# Patient Record
Sex: FEMALE | Race: WHITE | Employment: UNEMPLOYED
[De-identification: names, ages, dates, MRNs, and addresses within clinical notes are randomized per-mention and may not be internally consistent; named-entity substitution may affect disease eponyms.]

---
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
## 2018-04-04 ENCOUNTER — HOSPITAL ENCOUNTER (EMERGENCY)
Age: 18
Discharge: HOME | End: 2018-04-04
Payer: COMMERCIAL

## 2018-04-04 VITALS
SYSTOLIC BLOOD PRESSURE: 119 MMHG | WEIGHT: 180 LBS | RESPIRATION RATE: 16 BRPM | DIASTOLIC BLOOD PRESSURE: 62 MMHG | OXYGEN SATURATION: 98 % | BODY MASS INDEX: 34.01 KG/M2 | TEMPERATURE: 97.2 F | HEART RATE: 87 BPM

## 2018-04-04 DIAGNOSIS — R42 VERTIGO: Primary | ICD-10-CM

## 2018-04-04 LAB
-: ABNORMAL
ABSOLUTE EOS #: 0.08 K/UL (ref 0–0.44)
ABSOLUTE IMMATURE GRANULOCYTE: 0.03 K/UL (ref 0–0.3)
ABSOLUTE LYMPH #: 2.04 K/UL (ref 1.2–5.2)
ABSOLUTE MONO #: 0.47 K/UL (ref 0.1–1.4)
ALBUMIN SERPL-MCNC: 4.1 G/DL (ref 3.5–5.2)
ALBUMIN/GLOBULIN RATIO: 1.5 (ref 1–2.5)
ALP BLD-CCNC: 106 U/L (ref 35–104)
ALT SERPL-CCNC: <5 U/L (ref 5–33)
AMORPHOUS: ABNORMAL
ANION GAP SERPL CALCULATED.3IONS-SCNC: 10 MMOL/L (ref 9–17)
AST SERPL-CCNC: 10 U/L
BACTERIA: ABNORMAL
BASOPHILS # BLD: 1 % (ref 0–2)
BASOPHILS ABSOLUTE: 0.05 K/UL (ref 0–0.2)
BILIRUB SERPL-MCNC: 0.34 MG/DL (ref 0.3–1.2)
BILIRUBIN URINE: NEGATIVE
BUN BLDV-MCNC: 6 MG/DL (ref 6–20)
BUN/CREAT BLD: 10 (ref 9–20)
CALCIUM SERPL-MCNC: 8.8 MG/DL (ref 8.6–10.4)
CASTS UA: ABNORMAL /LPF
CHLORIDE BLD-SCNC: 102 MMOL/L (ref 98–107)
CO2: 22 MMOL/L (ref 20–31)
COLOR: YELLOW
COMMENT UA: ABNORMAL
CREAT SERPL-MCNC: 0.63 MG/DL (ref 0.5–0.9)
CRYSTALS, UA: ABNORMAL /HPF
DIFFERENTIAL TYPE: ABNORMAL
EKG ATRIAL RATE: 88 BPM
EKG P AXIS: 59 DEGREES
EKG P-R INTERVAL: 132 MS
EKG Q-T INTERVAL: 370 MS
EKG QRS DURATION: 78 MS
EKG QTC CALCULATION (BAZETT): 447 MS
EKG R AXIS: 63 DEGREES
EKG T AXIS: 43 DEGREES
EKG VENTRICULAR RATE: 88 BPM
EOSINOPHILS RELATIVE PERCENT: 1 % (ref 1–4)
EPITHELIAL CELLS UA: ABNORMAL /HPF (ref 0–25)
GFR AFRICAN AMERICAN: >60 ML/MIN
GFR NON-AFRICAN AMERICAN: >60 ML/MIN
GFR SERPL CREATININE-BSD FRML MDRD: ABNORMAL ML/MIN/{1.73_M2}
GFR SERPL CREATININE-BSD FRML MDRD: ABNORMAL ML/MIN/{1.73_M2}
GLUCOSE BLD-MCNC: 96 MG/DL (ref 70–99)
GLUCOSE URINE: NEGATIVE
HCG(URINE) PREGNANCY TEST: NEGATIVE
HCT VFR BLD CALC: 39.1 % (ref 36.3–47.1)
HEMOGLOBIN: 12.5 G/DL (ref 11.9–15.1)
IMMATURE GRANULOCYTES: 0 %
KETONES, URINE: NEGATIVE
LEUKOCYTE ESTERASE, URINE: ABNORMAL
LYMPHOCYTES # BLD: 20 % (ref 25–45)
MCH RBC QN AUTO: 26.7 PG (ref 25.2–33.5)
MCHC RBC AUTO-ENTMCNC: 32 G/DL (ref 28.4–34.8)
MCV RBC AUTO: 83.5 FL (ref 82.6–102.9)
MONOCYTES # BLD: 5 % (ref 2–8)
MUCUS: ABNORMAL
NITRITE, URINE: NEGATIVE
NRBC AUTOMATED: 0 PER 100 WBC
OTHER OBSERVATIONS UA: ABNORMAL
PDW BLD-RTO: 14.3 % (ref 11.8–14.4)
PH UA: 5.5 (ref 5–9)
PLATELET # BLD: 377 K/UL (ref 138–453)
PLATELET ESTIMATE: ABNORMAL
PMV BLD AUTO: 9.9 FL (ref 8.1–13.5)
POTASSIUM SERPL-SCNC: 4 MMOL/L (ref 3.7–5.3)
PROTEIN UA: NEGATIVE
RBC # BLD: 4.68 M/UL (ref 3.95–5.11)
RBC # BLD: ABNORMAL 10*6/UL
RBC UA: ABNORMAL /HPF (ref 0–2)
RENAL EPITHELIAL, UA: ABNORMAL /HPF
SEG NEUTROPHILS: 74 % (ref 34–64)
SEGMENTED NEUTROPHILS ABSOLUTE COUNT: 7.58 K/UL (ref 1.8–8)
SODIUM BLD-SCNC: 134 MMOL/L (ref 135–144)
SPECIFIC GRAVITY UA: 1.02 (ref 1.01–1.02)
TOTAL PROTEIN: 6.8 G/DL (ref 6.4–8.3)
TRICHOMONAS: ABNORMAL
TROPONIN INTERP: NORMAL
TROPONIN T: <0.03 NG/ML
TURBIDITY: CLEAR
URINE HGB: NEGATIVE
UROBILINOGEN, URINE: NORMAL
WBC # BLD: 10.3 K/UL (ref 4.5–13.5)
WBC # BLD: ABNORMAL 10*3/UL
WBC UA: ABNORMAL /HPF (ref 0–5)
YEAST: ABNORMAL

## 2018-04-04 PROCEDURE — 36415 COLL VENOUS BLD VENIPUNCTURE: CPT

## 2018-04-04 PROCEDURE — 93005 ELECTROCARDIOGRAM TRACING: CPT

## 2018-04-04 PROCEDURE — 6360000002 HC RX W HCPCS: Performed by: PHYSICIAN ASSISTANT

## 2018-04-04 PROCEDURE — 80053 COMPREHEN METABOLIC PANEL: CPT

## 2018-04-04 PROCEDURE — 99284 EMERGENCY DEPT VISIT MOD MDM: CPT

## 2018-04-04 PROCEDURE — 84703 CHORIONIC GONADOTROPIN ASSAY: CPT

## 2018-04-04 PROCEDURE — 84484 ASSAY OF TROPONIN QUANT: CPT

## 2018-04-04 PROCEDURE — 6370000000 HC RX 637 (ALT 250 FOR IP): Performed by: PHYSICIAN ASSISTANT

## 2018-04-04 PROCEDURE — 81001 URINALYSIS AUTO W/SCOPE: CPT

## 2018-04-04 PROCEDURE — 96374 THER/PROPH/DIAG INJ IV PUSH: CPT

## 2018-04-04 PROCEDURE — 85025 COMPLETE CBC W/AUTO DIFF WBC: CPT

## 2018-04-04 PROCEDURE — 2580000003 HC RX 258: Performed by: PHYSICIAN ASSISTANT

## 2018-04-04 RX ORDER — ONDANSETRON 2 MG/ML
4 INJECTION INTRAMUSCULAR; INTRAVENOUS ONCE
Status: COMPLETED | OUTPATIENT
Start: 2018-04-04 | End: 2018-04-04

## 2018-04-04 RX ORDER — 0.9 % SODIUM CHLORIDE 0.9 %
1000 INTRAVENOUS SOLUTION INTRAVENOUS ONCE
Status: COMPLETED | OUTPATIENT
Start: 2018-04-04 | End: 2018-04-04

## 2018-04-04 RX ORDER — MECLIZINE HCL 12.5 MG/1
12.5 TABLET ORAL ONCE
Status: COMPLETED | OUTPATIENT
Start: 2018-04-04 | End: 2018-04-04

## 2018-04-04 RX ORDER — ONDANSETRON 4 MG/1
4 TABLET, ORALLY DISINTEGRATING ORAL EVERY 8 HOURS PRN
Qty: 12 TABLET | Refills: 0 | Status: SHIPPED | OUTPATIENT
Start: 2018-04-04 | End: 2018-04-13

## 2018-04-04 RX ADMIN — SODIUM CHLORIDE 1000 ML: 9 INJECTION, SOLUTION INTRAVENOUS at 12:45

## 2018-04-04 RX ADMIN — MECLIZINE 12.5 MG: 12.5 TABLET ORAL at 13:21

## 2018-04-04 RX ADMIN — ONDANSETRON 4 MG: 2 INJECTION INTRAMUSCULAR; INTRAVENOUS at 12:46

## 2018-04-04 ASSESSMENT — ENCOUNTER SYMPTOMS
SORE THROAT: 0
EYE REDNESS: 0
VOMITING: 0
SHORTNESS OF BREATH: 0
COUGH: 0
NAUSEA: 1
ABDOMINAL PAIN: 0
BLOOD IN STOOL: 0
WHEEZING: 0
CONSTIPATION: 0
DIARRHEA: 0
RHINORRHEA: 0
EYE DISCHARGE: 0
CHEST TIGHTNESS: 0
BACK PAIN: 0

## 2018-04-04 ASSESSMENT — PAIN SCALES - GENERAL
PAINLEVEL_OUTOF10: 0
PAINLEVEL_OUTOF10: 4

## 2018-04-04 ASSESSMENT — PAIN DESCRIPTION - DESCRIPTORS: DESCRIPTORS: THROBBING

## 2018-04-04 ASSESSMENT — PAIN DESCRIPTION - LOCATION: LOCATION: HEAD

## 2018-04-04 ASSESSMENT — PAIN DESCRIPTION - PAIN TYPE: TYPE: ACUTE PAIN

## 2018-04-04 ASSESSMENT — PAIN DESCRIPTION - ORIENTATION: ORIENTATION: ANTERIOR

## 2018-04-09 ENCOUNTER — HOSPITAL ENCOUNTER (EMERGENCY)
Age: 18
Discharge: HOME | End: 2018-04-09
Attending: EMERGENCY MEDICINE
Payer: COMMERCIAL

## 2018-04-09 VITALS
TEMPERATURE: 97.7 F | SYSTOLIC BLOOD PRESSURE: 105 MMHG | HEART RATE: 80 BPM | DIASTOLIC BLOOD PRESSURE: 66 MMHG | RESPIRATION RATE: 14 BRPM | OXYGEN SATURATION: 98 %

## 2018-04-09 DIAGNOSIS — R42 DIZZINESS: ICD-10-CM

## 2018-04-09 DIAGNOSIS — R42 VERTIGO: Primary | ICD-10-CM

## 2018-04-09 DIAGNOSIS — R11.2 NON-INTRACTABLE VOMITING WITH NAUSEA, UNSPECIFIED VOMITING TYPE: ICD-10-CM

## 2018-04-09 LAB
-: ABNORMAL
ABSOLUTE EOS #: 0.14 K/UL (ref 0–0.44)
ABSOLUTE IMMATURE GRANULOCYTE: 0.03 K/UL (ref 0–0.3)
ABSOLUTE LYMPH #: 1.92 K/UL (ref 1.2–5.2)
ABSOLUTE MONO #: 0.35 K/UL (ref 0.1–1.4)
ALBUMIN SERPL-MCNC: 4.1 G/DL (ref 3.5–5.2)
ALBUMIN/GLOBULIN RATIO: 1.5 (ref 1–2.5)
ALP BLD-CCNC: 110 U/L (ref 35–104)
ALT SERPL-CCNC: 6 U/L (ref 5–33)
AMORPHOUS: ABNORMAL
ANION GAP SERPL CALCULATED.3IONS-SCNC: 11 MMOL/L (ref 9–17)
AST SERPL-CCNC: 8 U/L
BACTERIA: ABNORMAL
BASOPHILS # BLD: 1 % (ref 0–2)
BASOPHILS ABSOLUTE: 0.04 K/UL (ref 0–0.2)
BILIRUB SERPL-MCNC: <0.1 MG/DL (ref 0.3–1.2)
BILIRUBIN URINE: NEGATIVE
BUN BLDV-MCNC: 12 MG/DL (ref 6–20)
BUN/CREAT BLD: 16 (ref 9–20)
CALCIUM SERPL-MCNC: 9.6 MG/DL (ref 8.6–10.4)
CASTS UA: ABNORMAL /LPF
CHLORIDE BLD-SCNC: 104 MMOL/L (ref 98–107)
CO2: 25 MMOL/L (ref 20–31)
COLOR: YELLOW
COMMENT UA: ABNORMAL
CREAT SERPL-MCNC: 0.74 MG/DL (ref 0.5–0.9)
CRYSTALS, UA: ABNORMAL /HPF
DIFFERENTIAL TYPE: ABNORMAL
EOSINOPHILS RELATIVE PERCENT: 2 % (ref 1–4)
EPITHELIAL CELLS UA: ABNORMAL /HPF (ref 0–25)
GFR AFRICAN AMERICAN: >60 ML/MIN
GFR NON-AFRICAN AMERICAN: >60 ML/MIN
GFR SERPL CREATININE-BSD FRML MDRD: ABNORMAL ML/MIN/{1.73_M2}
GFR SERPL CREATININE-BSD FRML MDRD: ABNORMAL ML/MIN/{1.73_M2}
GLUCOSE BLD-MCNC: 81 MG/DL (ref 70–99)
GLUCOSE URINE: NEGATIVE
HCG(URINE) PREGNANCY TEST: NEGATIVE
HCT VFR BLD CALC: 40.9 % (ref 36.3–47.1)
HEMOGLOBIN: 13.2 G/DL (ref 11.9–15.1)
IMMATURE GRANULOCYTES: 0 %
KETONES, URINE: NEGATIVE
LEUKOCYTE ESTERASE, URINE: NEGATIVE
LIPASE: 26 U/L (ref 13–60)
LYMPHOCYTES # BLD: 25 % (ref 25–45)
MCH RBC QN AUTO: 27 PG (ref 25.2–33.5)
MCHC RBC AUTO-ENTMCNC: 32.3 G/DL (ref 28.4–34.8)
MCV RBC AUTO: 83.6 FL (ref 82.6–102.9)
MONOCYTES # BLD: 5 % (ref 2–8)
MUCUS: ABNORMAL
NITRITE, URINE: NEGATIVE
NRBC AUTOMATED: 0 PER 100 WBC
OTHER OBSERVATIONS UA: ABNORMAL
PDW BLD-RTO: 14.4 % (ref 11.8–14.4)
PH UA: 7 (ref 5–9)
PLATELET # BLD: 403 K/UL (ref 138–453)
PLATELET ESTIMATE: ABNORMAL
PMV BLD AUTO: 10 FL (ref 8.1–13.5)
POTASSIUM SERPL-SCNC: 4 MMOL/L (ref 3.7–5.3)
PROTEIN UA: NEGATIVE
RBC # BLD: 4.89 M/UL (ref 3.95–5.11)
RBC # BLD: ABNORMAL 10*6/UL
RBC UA: ABNORMAL /HPF (ref 0–2)
RENAL EPITHELIAL, UA: ABNORMAL /HPF
SEG NEUTROPHILS: 67 % (ref 34–64)
SEGMENTED NEUTROPHILS ABSOLUTE COUNT: 5.24 K/UL (ref 1.8–8)
SODIUM BLD-SCNC: 140 MMOL/L (ref 135–144)
SPECIFIC GRAVITY UA: 1.02 (ref 1.01–1.02)
TOTAL PROTEIN: 6.8 G/DL (ref 6.4–8.3)
TRICHOMONAS: ABNORMAL
TURBIDITY: ABNORMAL
URINE HGB: ABNORMAL
UROBILINOGEN, URINE: NORMAL
WBC # BLD: 7.7 K/UL (ref 4.5–13.5)
WBC # BLD: ABNORMAL 10*3/UL
WBC UA: ABNORMAL /HPF (ref 0–5)
YEAST: ABNORMAL

## 2018-04-09 PROCEDURE — 84703 CHORIONIC GONADOTROPIN ASSAY: CPT

## 2018-04-09 PROCEDURE — 80053 COMPREHEN METABOLIC PANEL: CPT

## 2018-04-09 PROCEDURE — 87086 URINE CULTURE/COLONY COUNT: CPT

## 2018-04-09 PROCEDURE — 99283 EMERGENCY DEPT VISIT LOW MDM: CPT

## 2018-04-09 PROCEDURE — 85025 COMPLETE CBC W/AUTO DIFF WBC: CPT

## 2018-04-09 PROCEDURE — 81001 URINALYSIS AUTO W/SCOPE: CPT

## 2018-04-09 PROCEDURE — 2580000003 HC RX 258: Performed by: EMERGENCY MEDICINE

## 2018-04-09 PROCEDURE — 6360000002 HC RX W HCPCS: Performed by: EMERGENCY MEDICINE

## 2018-04-09 PROCEDURE — 6370000000 HC RX 637 (ALT 250 FOR IP): Performed by: EMERGENCY MEDICINE

## 2018-04-09 PROCEDURE — 83690 ASSAY OF LIPASE: CPT

## 2018-04-09 PROCEDURE — 36415 COLL VENOUS BLD VENIPUNCTURE: CPT

## 2018-04-09 RX ORDER — MECLIZINE HCL 12.5 MG/1
12.5 TABLET ORAL ONCE
Status: COMPLETED | OUTPATIENT
Start: 2018-04-09 | End: 2018-04-09

## 2018-04-09 RX ORDER — PROMETHAZINE HYDROCHLORIDE 25 MG/1
25 TABLET ORAL ONCE
Status: COMPLETED | OUTPATIENT
Start: 2018-04-09 | End: 2018-04-09

## 2018-04-09 RX ORDER — PROMETHAZINE HYDROCHLORIDE 25 MG/1
25 TABLET ORAL EVERY 6 HOURS PRN
Qty: 28 TABLET | Refills: 0 | Status: SHIPPED | OUTPATIENT
Start: 2018-04-09 | End: 2018-04-16

## 2018-04-09 RX ORDER — 0.9 % SODIUM CHLORIDE 0.9 %
1000 INTRAVENOUS SOLUTION INTRAVENOUS ONCE
Status: COMPLETED | OUTPATIENT
Start: 2018-04-09 | End: 2018-04-09

## 2018-04-09 RX ADMIN — PROMETHAZINE HYDROCHLORIDE 25 MG: 25 TABLET ORAL at 10:53

## 2018-04-09 RX ADMIN — SODIUM CHLORIDE 1000 ML: 9 INJECTION, SOLUTION INTRAVENOUS at 11:04

## 2018-04-09 RX ADMIN — MECLIZINE 12.5 MG: 12.5 TABLET ORAL at 10:53

## 2018-04-09 ASSESSMENT — ENCOUNTER SYMPTOMS
NAUSEA: 1
VOMITING: 1

## 2018-04-10 LAB
CULTURE: NO GROWTH
CULTURE: NORMAL
Lab: NORMAL
Lab: NORMAL
SPECIMEN DESCRIPTION: NORMAL
SPECIMEN DESCRIPTION: NORMAL
STATUS: NORMAL

## 2018-04-13 ENCOUNTER — OFFICE VISIT (OUTPATIENT)
Dept: PRIMARY CARE CLINIC | Age: 18
End: 2018-04-13
Payer: COMMERCIAL

## 2018-04-13 VITALS
TEMPERATURE: 99.4 F | WEIGHT: 175.3 LBS | RESPIRATION RATE: 18 BRPM | DIASTOLIC BLOOD PRESSURE: 62 MMHG | HEART RATE: 84 BPM | SYSTOLIC BLOOD PRESSURE: 108 MMHG | BODY MASS INDEX: 33.12 KG/M2

## 2018-04-13 DIAGNOSIS — R42 VERTIGO: Primary | ICD-10-CM

## 2018-04-13 PROCEDURE — G8417 CALC BMI ABV UP PARAM F/U: HCPCS | Performed by: NURSE PRACTITIONER

## 2018-04-13 PROCEDURE — 4004F PT TOBACCO SCREEN RCVD TLK: CPT | Performed by: NURSE PRACTITIONER

## 2018-04-13 PROCEDURE — 99213 OFFICE O/P EST LOW 20 MIN: CPT | Performed by: NURSE PRACTITIONER

## 2018-04-13 PROCEDURE — G8427 DOCREV CUR MEDS BY ELIG CLIN: HCPCS | Performed by: NURSE PRACTITIONER

## 2018-04-13 ASSESSMENT — ENCOUNTER SYMPTOMS
RESPIRATORY NEGATIVE: 1
TROUBLE SWALLOWING: 0
EYES NEGATIVE: 1
GASTROINTESTINAL NEGATIVE: 1

## 2018-05-04 ENCOUNTER — OFFICE VISIT (OUTPATIENT)
Dept: OBGYN | Age: 18
End: 2018-05-04
Payer: COMMERCIAL

## 2018-05-04 VITALS
SYSTOLIC BLOOD PRESSURE: 112 MMHG | DIASTOLIC BLOOD PRESSURE: 72 MMHG | WEIGHT: 180.6 LBS | HEIGHT: 61 IN | BODY MASS INDEX: 34.1 KG/M2

## 2018-05-04 DIAGNOSIS — B96.89 BACTERIAL VAGINOSIS: Primary | ICD-10-CM

## 2018-05-04 DIAGNOSIS — N76.0 BACTERIAL VAGINOSIS: Primary | ICD-10-CM

## 2018-05-04 PROCEDURE — G8427 DOCREV CUR MEDS BY ELIG CLIN: HCPCS | Performed by: ADVANCED PRACTICE MIDWIFE

## 2018-05-04 PROCEDURE — G8417 CALC BMI ABV UP PARAM F/U: HCPCS | Performed by: ADVANCED PRACTICE MIDWIFE

## 2018-05-04 PROCEDURE — 99213 OFFICE O/P EST LOW 20 MIN: CPT | Performed by: ADVANCED PRACTICE MIDWIFE

## 2018-05-04 PROCEDURE — 4004F PT TOBACCO SCREEN RCVD TLK: CPT | Performed by: ADVANCED PRACTICE MIDWIFE

## 2018-05-04 RX ORDER — METRONIDAZOLE 500 MG/1
500 TABLET ORAL 2 TIMES DAILY
Qty: 14 TABLET | Refills: 0 | Status: SHIPPED | OUTPATIENT
Start: 2018-05-04 | End: 2018-05-11

## 2018-05-24 ENCOUNTER — TELEPHONE (OUTPATIENT)
Dept: OBGYN | Age: 18
End: 2018-05-24

## 2018-05-24 DIAGNOSIS — N76.0 ACUTE VAGINITIS: Primary | ICD-10-CM

## 2018-06-09 ENCOUNTER — HOSPITAL ENCOUNTER (EMERGENCY)
Age: 18
Discharge: HOME | End: 2018-06-09
Attending: EMERGENCY MEDICINE
Payer: COMMERCIAL

## 2018-06-09 VITALS
HEIGHT: 61 IN | TEMPERATURE: 98.4 F | OXYGEN SATURATION: 98 % | BODY MASS INDEX: 32.1 KG/M2 | RESPIRATION RATE: 12 BRPM | HEART RATE: 100 BPM | DIASTOLIC BLOOD PRESSURE: 61 MMHG | SYSTOLIC BLOOD PRESSURE: 111 MMHG | WEIGHT: 170 LBS

## 2018-06-09 DIAGNOSIS — H60.392 INFECTIVE OTITIS EXTERNA OF LEFT EAR: Primary | ICD-10-CM

## 2018-06-09 PROCEDURE — 99282 EMERGENCY DEPT VISIT SF MDM: CPT

## 2018-06-09 RX ORDER — CIPROFLOXACIN AND DEXAMETHASONE 3; 1 MG/ML; MG/ML
4 SUSPENSION/ DROPS AURICULAR (OTIC) 2 TIMES DAILY
Qty: 1 BOTTLE | Refills: 0 | Status: SHIPPED | OUTPATIENT
Start: 2018-06-09 | End: 2018-06-16

## 2018-06-09 ASSESSMENT — PAIN DESCRIPTION - ORIENTATION: ORIENTATION: LEFT

## 2018-06-09 ASSESSMENT — PAIN DESCRIPTION - PAIN TYPE: TYPE: ACUTE PAIN

## 2018-06-09 ASSESSMENT — PAIN DESCRIPTION - LOCATION: LOCATION: EAR

## 2018-06-09 ASSESSMENT — ENCOUNTER SYMPTOMS
COUGH: 1
SINUS PRESSURE: 1

## 2018-06-09 ASSESSMENT — PAIN SCALES - GENERAL: PAINLEVEL_OUTOF10: 7

## 2018-06-11 ENCOUNTER — HOSPITAL ENCOUNTER (EMERGENCY)
Age: 18
Discharge: HOME | End: 2018-06-11
Attending: EMERGENCY MEDICINE
Payer: COMMERCIAL

## 2018-06-11 ENCOUNTER — APPOINTMENT (OUTPATIENT)
Dept: ULTRASOUND IMAGING | Age: 18
End: 2018-06-11
Payer: COMMERCIAL

## 2018-06-11 VITALS
HEART RATE: 89 BPM | SYSTOLIC BLOOD PRESSURE: 126 MMHG | TEMPERATURE: 98.5 F | DIASTOLIC BLOOD PRESSURE: 70 MMHG | RESPIRATION RATE: 16 BRPM | OXYGEN SATURATION: 98 %

## 2018-06-11 DIAGNOSIS — O26.91 COMPLICATION OF PREGNANCY IN FIRST TRIMESTER: Primary | ICD-10-CM

## 2018-06-11 DIAGNOSIS — N39.0 URINARY TRACT INFECTION WITHOUT HEMATURIA, SITE UNSPECIFIED: ICD-10-CM

## 2018-06-11 LAB
-: ABNORMAL
ALBUMIN SERPL-MCNC: 4 G/DL (ref 3.5–5.2)
ALBUMIN/GLOBULIN RATIO: 1.3 (ref 1–2.5)
ALP BLD-CCNC: 101 U/L (ref 35–104)
ALT SERPL-CCNC: 7 U/L (ref 5–33)
AMORPHOUS: ABNORMAL
ANION GAP SERPL CALCULATED.3IONS-SCNC: 16 MMOL/L (ref 9–17)
AST SERPL-CCNC: 9 U/L
BACTERIA: ABNORMAL
BILIRUB SERPL-MCNC: 0.17 MG/DL (ref 0.3–1.2)
BILIRUBIN DIRECT: <0.08 MG/DL
BILIRUBIN URINE: NEGATIVE
BILIRUBIN, INDIRECT: ABNORMAL MG/DL (ref 0–1)
BUN BLDV-MCNC: 4 MG/DL (ref 6–20)
BUN/CREAT BLD: 8 (ref 9–20)
CALCIUM SERPL-MCNC: 9.4 MG/DL (ref 8.6–10.4)
CASTS UA: ABNORMAL /LPF
CHLORIDE BLD-SCNC: 98 MMOL/L (ref 98–107)
CO2: 21 MMOL/L (ref 20–31)
COLOR: YELLOW
COMMENT UA: ABNORMAL
CREAT SERPL-MCNC: 0.52 MG/DL (ref 0.5–0.9)
CRYSTALS, UA: ABNORMAL /HPF
EKG ATRIAL RATE: 74 BPM
EKG P AXIS: 42 DEGREES
EKG P-R INTERVAL: 130 MS
EKG Q-T INTERVAL: 394 MS
EKG QRS DURATION: 80 MS
EKG QTC CALCULATION (BAZETT): 437 MS
EKG R AXIS: 69 DEGREES
EKG T AXIS: 46 DEGREES
EKG VENTRICULAR RATE: 74 BPM
EPITHELIAL CELLS UA: ABNORMAL /HPF (ref 0–25)
GFR AFRICAN AMERICAN: >60 ML/MIN
GFR NON-AFRICAN AMERICAN: >60 ML/MIN
GFR SERPL CREATININE-BSD FRML MDRD: ABNORMAL ML/MIN/{1.73_M2}
GFR SERPL CREATININE-BSD FRML MDRD: ABNORMAL ML/MIN/{1.73_M2}
GLOBULIN: ABNORMAL G/DL (ref 1.5–3.8)
GLUCOSE BLD-MCNC: 100 MG/DL (ref 70–99)
GLUCOSE URINE: NEGATIVE
HCG QUANTITATIVE: ABNORMAL IU/L
HCG(URINE) PREGNANCY TEST: POSITIVE
HCT VFR BLD CALC: 39.1 % (ref 36.3–47.1)
HEMOGLOBIN: 12.8 G/DL (ref 11.9–15.1)
KETONES, URINE: ABNORMAL
LEUKOCYTE ESTERASE, URINE: NEGATIVE
LIPASE: 16 U/L (ref 13–60)
MCH RBC QN AUTO: 26.8 PG (ref 25.2–33.5)
MCHC RBC AUTO-ENTMCNC: 32.7 G/DL (ref 28.4–34.8)
MCV RBC AUTO: 82 FL (ref 82.6–102.9)
MUCUS: ABNORMAL
NITRITE, URINE: POSITIVE
NRBC AUTOMATED: 0 PER 100 WBC
OTHER OBSERVATIONS UA: ABNORMAL
PDW BLD-RTO: 15.1 % (ref 11.8–14.4)
PH UA: 6 (ref 5–9)
PLATELET # BLD: 415 K/UL (ref 138–453)
PMV BLD AUTO: 9.9 FL (ref 8.1–13.5)
POTASSIUM SERPL-SCNC: 3.5 MMOL/L (ref 3.7–5.3)
PROTEIN UA: NEGATIVE
RBC # BLD: 4.77 M/UL (ref 3.95–5.11)
RBC UA: ABNORMAL /HPF (ref 0–2)
RENAL EPITHELIAL, UA: ABNORMAL /HPF
SODIUM BLD-SCNC: 135 MMOL/L (ref 135–144)
SPECIFIC GRAVITY UA: 1.01 (ref 1.01–1.02)
TOTAL PROTEIN: 7.2 G/DL (ref 6.4–8.3)
TRICHOMONAS: ABNORMAL
TURBIDITY: CLEAR
URINE HGB: NEGATIVE
UROBILINOGEN, URINE: NORMAL
WBC # BLD: 11.4 K/UL (ref 4.5–13.5)
WBC UA: ABNORMAL /HPF (ref 0–5)
YEAST: ABNORMAL

## 2018-06-11 PROCEDURE — 6360000002 HC RX W HCPCS: Performed by: EMERGENCY MEDICINE

## 2018-06-11 PROCEDURE — 96375 TX/PRO/DX INJ NEW DRUG ADDON: CPT

## 2018-06-11 PROCEDURE — 76817 TRANSVAGINAL US OBSTETRIC: CPT

## 2018-06-11 PROCEDURE — 96374 THER/PROPH/DIAG INJ IV PUSH: CPT

## 2018-06-11 PROCEDURE — 87086 URINE CULTURE/COLONY COUNT: CPT

## 2018-06-11 PROCEDURE — 81001 URINALYSIS AUTO W/SCOPE: CPT

## 2018-06-11 PROCEDURE — 80076 HEPATIC FUNCTION PANEL: CPT

## 2018-06-11 PROCEDURE — 80048 BASIC METABOLIC PNL TOTAL CA: CPT

## 2018-06-11 PROCEDURE — 85027 COMPLETE CBC AUTOMATED: CPT

## 2018-06-11 PROCEDURE — 2500000003 HC RX 250 WO HCPCS: Performed by: EMERGENCY MEDICINE

## 2018-06-11 PROCEDURE — 87077 CULTURE AEROBIC IDENTIFY: CPT

## 2018-06-11 PROCEDURE — 84702 CHORIONIC GONADOTROPIN TEST: CPT

## 2018-06-11 PROCEDURE — 83690 ASSAY OF LIPASE: CPT

## 2018-06-11 PROCEDURE — 2580000003 HC RX 258: Performed by: EMERGENCY MEDICINE

## 2018-06-11 PROCEDURE — 84703 CHORIONIC GONADOTROPIN ASSAY: CPT

## 2018-06-11 PROCEDURE — 93005 ELECTROCARDIOGRAM TRACING: CPT

## 2018-06-11 PROCEDURE — 87186 SC STD MICRODIL/AGAR DIL: CPT

## 2018-06-11 PROCEDURE — 6370000000 HC RX 637 (ALT 250 FOR IP): Performed by: EMERGENCY MEDICINE

## 2018-06-11 PROCEDURE — 99284 EMERGENCY DEPT VISIT MOD MDM: CPT

## 2018-06-11 PROCEDURE — S0028 INJECTION, FAMOTIDINE, 20 MG: HCPCS | Performed by: EMERGENCY MEDICINE

## 2018-06-11 RX ORDER — 0.9 % SODIUM CHLORIDE 0.9 %
1000 INTRAVENOUS SOLUTION INTRAVENOUS ONCE
Status: COMPLETED | OUTPATIENT
Start: 2018-06-11 | End: 2018-06-11

## 2018-06-11 RX ORDER — CEPHALEXIN 500 MG/1
500 CAPSULE ORAL ONCE
Status: COMPLETED | OUTPATIENT
Start: 2018-06-11 | End: 2018-06-11

## 2018-06-11 RX ORDER — ONDANSETRON 2 MG/ML
4 INJECTION INTRAMUSCULAR; INTRAVENOUS ONCE
Status: COMPLETED | OUTPATIENT
Start: 2018-06-11 | End: 2018-06-11

## 2018-06-11 RX ORDER — CEPHALEXIN 500 MG/1
500 CAPSULE ORAL 2 TIMES DAILY
Qty: 14 CAPSULE | Refills: 0 | Status: SHIPPED | OUTPATIENT
Start: 2018-06-11 | End: 2018-06-18

## 2018-06-11 RX ORDER — FLUTICASONE PROPIONATE 50 MCG
1 SPRAY, SUSPENSION (ML) NASAL DAILY
Status: DISCONTINUED | OUTPATIENT
Start: 2018-06-11 | End: 2018-06-12 | Stop reason: HOSPADM

## 2018-06-11 RX ADMIN — SODIUM CHLORIDE 1000 ML: 9 INJECTION, SOLUTION INTRAVENOUS at 20:35

## 2018-06-11 RX ADMIN — FAMOTIDINE 20 MG: 10 INJECTION, SOLUTION INTRAVENOUS at 19:40

## 2018-06-11 RX ADMIN — ONDANSETRON 4 MG: 2 INJECTION INTRAMUSCULAR; INTRAVENOUS at 19:40

## 2018-06-11 RX ADMIN — FLUTICASONE PROPIONATE 1 SPRAY: 50 SPRAY, METERED NASAL at 19:48

## 2018-06-11 RX ADMIN — CEPHALEXIN 500 MG: 500 CAPSULE ORAL at 22:58

## 2018-06-11 ASSESSMENT — ENCOUNTER SYMPTOMS
DIARRHEA: 0
NAUSEA: 1
SHORTNESS OF BREATH: 0
CONSTIPATION: 0
SORE THROAT: 0
WHEEZING: 0
ABDOMINAL PAIN: 1
RHINORRHEA: 0
COUGH: 0
VOMITING: 1
ABDOMINAL DISTENTION: 0

## 2018-06-11 ASSESSMENT — PAIN SCALES - GENERAL: PAINLEVEL_OUTOF10: 8

## 2018-06-11 ASSESSMENT — PAIN DESCRIPTION - LOCATION: LOCATION: ABDOMEN

## 2018-06-11 ASSESSMENT — PAIN DESCRIPTION - PAIN TYPE: TYPE: ACUTE PAIN

## 2018-06-12 ENCOUNTER — TELEPHONE (OUTPATIENT)
Dept: OBGYN | Age: 18
End: 2018-06-12

## 2018-06-13 LAB
CULTURE: ABNORMAL
CULTURE: ABNORMAL
Lab: ABNORMAL
Lab: ABNORMAL
ORGANISM: ABNORMAL
SPECIMEN DESCRIPTION: ABNORMAL
SPECIMEN DESCRIPTION: ABNORMAL
STATUS: ABNORMAL

## 2018-07-02 ENCOUNTER — INITIAL PRENATAL (OUTPATIENT)
Dept: OBGYN | Age: 18
End: 2018-07-02
Payer: COMMERCIAL

## 2018-07-02 ENCOUNTER — HOSPITAL ENCOUNTER (OUTPATIENT)
Age: 18
Discharge: HOME | End: 2018-07-02
Payer: COMMERCIAL

## 2018-07-02 VITALS
HEART RATE: 76 BPM | SYSTOLIC BLOOD PRESSURE: 118 MMHG | DIASTOLIC BLOOD PRESSURE: 64 MMHG | WEIGHT: 177 LBS | BODY MASS INDEX: 33.44 KG/M2

## 2018-07-02 DIAGNOSIS — N91.2 AMENORRHEA: ICD-10-CM

## 2018-07-02 DIAGNOSIS — K52.9 COLITIS: ICD-10-CM

## 2018-07-02 DIAGNOSIS — O21.9 NAUSEA AND VOMITING IN PREGNANCY: Primary | ICD-10-CM

## 2018-07-02 DIAGNOSIS — Z34.91 ENCOUNTER FOR SUPERVISION OF NORMAL PREGNANCY IN FIRST TRIMESTER, UNSPECIFIED GRAVIDITY: ICD-10-CM

## 2018-07-02 DIAGNOSIS — N91.2 AMENORRHEA: Primary | ICD-10-CM

## 2018-07-02 DIAGNOSIS — Z32.01 POSITIVE URINE PREGNANCY TEST: ICD-10-CM

## 2018-07-02 LAB
ABO/RH: NORMAL
ABSOLUTE EOS #: 0.18 K/UL (ref 0–0.44)
ABSOLUTE IMMATURE GRANULOCYTE: 0.04 K/UL (ref 0–0.3)
ABSOLUTE LYMPH #: 1.98 K/UL (ref 1.2–5.2)
ABSOLUTE MONO #: 0.62 K/UL (ref 0.1–1.4)
AMPHETAMINE SCREEN URINE: NEGATIVE
ANTIBODY SCREEN: NEGATIVE
BARBITURATE SCREEN URINE: NEGATIVE
BASOPHILS # BLD: 0 % (ref 0–2)
BASOPHILS ABSOLUTE: 0.03 K/UL (ref 0–0.2)
BENZODIAZEPINE SCREEN, URINE: NEGATIVE
BUPRENORPHINE URINE: NEGATIVE
CANNABINOID SCREEN URINE: NEGATIVE
COCAINE METABOLITE, URINE: NEGATIVE
CONTROL: ABNORMAL
DIFFERENTIAL TYPE: ABNORMAL
EOSINOPHILS RELATIVE PERCENT: 1 % (ref 1–4)
HCT VFR BLD CALC: 36.9 % (ref 36.3–47.1)
HEMOGLOBIN: 12.3 G/DL (ref 11.9–15.1)
HEPATITIS B SURFACE ANTIGEN: NONREACTIVE
HEPATITIS C ANTIBODY: NONREACTIVE
HIV AG/AB: NONREACTIVE
IMMATURE GRANULOCYTES: 0 %
LYMPHOCYTES # BLD: 16 % (ref 25–45)
MCH RBC QN AUTO: 27.8 PG (ref 25.2–33.5)
MCHC RBC AUTO-ENTMCNC: 33.3 G/DL (ref 28.4–34.8)
MCV RBC AUTO: 83.3 FL (ref 82.6–102.9)
MDMA URINE: NORMAL
METHADONE SCREEN, URINE: NEGATIVE
METHAMPHETAMINE, URINE: NEGATIVE
MONOCYTES # BLD: 5 % (ref 2–8)
NRBC AUTOMATED: 0 PER 100 WBC
OPIATES, URINE: NEGATIVE
OXYCODONE SCREEN URINE: NEGATIVE
PDW BLD-RTO: 15.6 % (ref 11.8–14.4)
PHENCYCLIDINE, URINE: NEGATIVE
PLATELET # BLD: 345 K/UL (ref 138–453)
PLATELET ESTIMATE: ABNORMAL
PMV BLD AUTO: 10.6 FL (ref 8.1–13.5)
PREGNANCY TEST URINE, POC: POSITIVE
PROPOXYPHENE, URINE: NEGATIVE
RBC # BLD: 4.43 M/UL (ref 3.95–5.11)
RBC # BLD: ABNORMAL 10*6/UL
RUBV IGG SER QL: 325.6 IU/ML
SEG NEUTROPHILS: 78 % (ref 34–64)
SEGMENTED NEUTROPHILS ABSOLUTE COUNT: 9.83 K/UL (ref 1.8–8)
T. PALLIDUM, IGG: NONREACTIVE
TEST INFORMATION: NORMAL
TRICYCLIC ANTIDEPRESSANTS, UR: NEGATIVE
WBC # BLD: 12.7 K/UL (ref 4.5–13.5)
WBC # BLD: ABNORMAL 10*3/UL

## 2018-07-02 PROCEDURE — 86803 HEPATITIS C AB TEST: CPT

## 2018-07-02 PROCEDURE — 87591 N.GONORRHOEAE DNA AMP PROB: CPT

## 2018-07-02 PROCEDURE — 86850 RBC ANTIBODY SCREEN: CPT

## 2018-07-02 PROCEDURE — 36415 COLL VENOUS BLD VENIPUNCTURE: CPT | Performed by: ADVANCED PRACTICE MIDWIFE

## 2018-07-02 PROCEDURE — 81025 URINE PREGNANCY TEST: CPT | Performed by: ADVANCED PRACTICE MIDWIFE

## 2018-07-02 PROCEDURE — 87086 URINE CULTURE/COLONY COUNT: CPT

## 2018-07-02 PROCEDURE — 87389 HIV-1 AG W/HIV-1&-2 AB AG IA: CPT

## 2018-07-02 PROCEDURE — 36415 COLL VENOUS BLD VENIPUNCTURE: CPT

## 2018-07-02 PROCEDURE — 86900 BLOOD TYPING SEROLOGIC ABO: CPT

## 2018-07-02 PROCEDURE — 86762 RUBELLA ANTIBODY: CPT

## 2018-07-02 PROCEDURE — 80306 DRUG TEST PRSMV INSTRMNT: CPT

## 2018-07-02 PROCEDURE — G8427 DOCREV CUR MEDS BY ELIG CLIN: HCPCS | Performed by: ADVANCED PRACTICE MIDWIFE

## 2018-07-02 PROCEDURE — H1000 PRENATAL CARE ATRISK ASSESSM: HCPCS | Performed by: ADVANCED PRACTICE MIDWIFE

## 2018-07-02 PROCEDURE — G8417 CALC BMI ABV UP PARAM F/U: HCPCS | Performed by: ADVANCED PRACTICE MIDWIFE

## 2018-07-02 PROCEDURE — 87491 CHLMYD TRACH DNA AMP PROBE: CPT

## 2018-07-02 PROCEDURE — 99211 OFF/OP EST MAY X REQ PHY/QHP: CPT | Performed by: ADVANCED PRACTICE MIDWIFE

## 2018-07-02 PROCEDURE — 86780 TREPONEMA PALLIDUM: CPT

## 2018-07-02 PROCEDURE — 86901 BLOOD TYPING SEROLOGIC RH(D): CPT

## 2018-07-02 PROCEDURE — 85025 COMPLETE CBC W/AUTO DIFF WBC: CPT

## 2018-07-02 PROCEDURE — 87340 HEPATITIS B SURFACE AG IA: CPT

## 2018-07-02 RX ORDER — ONDANSETRON 4 MG/1
4 TABLET, FILM COATED ORAL DAILY PRN
Qty: 30 TABLET | Refills: 2 | Status: ON HOLD | OUTPATIENT
Start: 2018-07-02 | End: 2019-01-30 | Stop reason: HOSPADM

## 2018-07-02 ASSESSMENT — PATIENT HEALTH QUESTIONNAIRE - PHQ9
SUM OF ALL RESPONSES TO PHQ QUESTIONS 1-9: 0
1. LITTLE INTEREST OR PLEASURE IN DOING THINGS: 0
2. FEELING DOWN, DEPRESSED OR HOPELESS: 0
SUM OF ALL RESPONSES TO PHQ9 QUESTIONS 1 & 2: 0

## 2018-07-02 NOTE — PROGRESS NOTES
New OB Visit    Date of service: 2018    Lorin Wheeler  Is a 19 y.o. single female presenting for a New OB visit with Nurse. Name of Father of Nasra Ontiveros is Javy Pérez and is involved. Pt does not work . PT's PCP is: AHSAN Urrutia - CNP     : 2000                                             Subjective:       Patient's last menstrual period was 05/10/2018. OB History    Para Term  AB Living   1             SAB TAB Ectopic Molar Multiple Live Births                    # Outcome Date GA Lbr Dionisio/2nd Weight Sex Delivery Anes PTL Lv   1 Current                         History   Smoking Status    Current Every Day Smoker    Packs/day: 0.50    Years: 2.00    Types: Cigarettes   Smokeless Tobacco    Never Used     Comment: trying to quit on own        History   Alcohol Use No     Comment: occasionally with family, hardly ever        Allergies: Silver and Soap      Current Outpatient Prescriptions:     Prenatal MV-Min-Fe Fum-FA-DHA (PRENATAL 1 PO), Take by mouth, Disp: , Rfl:     ondansetron (ZOFRAN) 4 MG tablet, Take 1 tablet by mouth daily as needed for Nausea or Vomiting, Disp: 30 tablet, Rfl: 2      Vital Signs Blood pressure 118/64, weight 177 lb (80.3 kg), last menstrual period 05/10/2018, not currently breastfeeding. No results found for this visit on 18. Pain: headaches-cramping round ligament-                            Nausea: yes      Vomiting: no        Breast enlargement or tenderness: yes    Frequency of urination:yes      Fatigue: yes           Patient history reviewed. Educational materials given and genetic testing reviewed. Assessment:      Diagnosis Orders   1. Amenorrhea  C.trachomatis N.gonorrhoeae DNA, Urine    Hepatitis C Antibody    HIV Screen    POCT urine pregnancy    PRENATAL PROFILE I    Prenatal type and screen    Urine culture clean catch    Urine Drug Screen, Comprehensive   2.  Encounter for supervision of normal pregnancy in

## 2018-07-03 LAB
C. TRACHOMATIS DNA ,URINE: NEGATIVE
CULTURE: NORMAL
Lab: NORMAL
N. GONORRHOEAE DNA, URINE: NEGATIVE
SPECIMEN DESCRIPTION: NORMAL
STATUS: NORMAL

## 2018-07-07 ENCOUNTER — HOSPITAL ENCOUNTER (EMERGENCY)
Age: 18
Discharge: HOME | End: 2018-07-07
Attending: EMERGENCY MEDICINE
Payer: COMMERCIAL

## 2018-07-07 VITALS
BODY MASS INDEX: 33.23 KG/M2 | SYSTOLIC BLOOD PRESSURE: 118 MMHG | DIASTOLIC BLOOD PRESSURE: 64 MMHG | TEMPERATURE: 98.5 F | WEIGHT: 176 LBS | OXYGEN SATURATION: 99 % | RESPIRATION RATE: 16 BRPM | HEIGHT: 61 IN | HEART RATE: 82 BPM

## 2018-07-07 DIAGNOSIS — O21.0 MORNING SICKNESS: Primary | ICD-10-CM

## 2018-07-07 PROCEDURE — 96374 THER/PROPH/DIAG INJ IV PUSH: CPT

## 2018-07-07 PROCEDURE — 2580000003 HC RX 258: Performed by: EMERGENCY MEDICINE

## 2018-07-07 PROCEDURE — 6360000002 HC RX W HCPCS: Performed by: EMERGENCY MEDICINE

## 2018-07-07 PROCEDURE — 99283 EMERGENCY DEPT VISIT LOW MDM: CPT

## 2018-07-07 RX ORDER — 0.9 % SODIUM CHLORIDE 0.9 %
1000 INTRAVENOUS SOLUTION INTRAVENOUS ONCE
Status: COMPLETED | OUTPATIENT
Start: 2018-07-07 | End: 2018-07-07

## 2018-07-07 RX ORDER — METOCLOPRAMIDE HYDROCHLORIDE 5 MG/ML
10 INJECTION INTRAMUSCULAR; INTRAVENOUS ONCE
Status: COMPLETED | OUTPATIENT
Start: 2018-07-07 | End: 2018-07-07

## 2018-07-07 RX ORDER — DOXYLAMINE SUCCINATE AND PYRIDOXINE HYDROCHLORIDE, DELAYED RELEASE TABLETS 10 MG/10 MG 10; 10 MG/1; MG/1
2 TABLET, DELAYED RELEASE ORAL EVERY EVENING
Qty: 15 TABLET | Refills: 0 | Status: SHIPPED | OUTPATIENT
Start: 2018-07-07 | End: 2018-09-04

## 2018-07-07 RX ADMIN — METOCLOPRAMIDE 10 MG: 5 INJECTION, SOLUTION INTRAMUSCULAR; INTRAVENOUS at 13:02

## 2018-07-07 RX ADMIN — SODIUM CHLORIDE 1000 ML: 9 INJECTION, SOLUTION INTRAVENOUS at 12:55

## 2018-07-07 ASSESSMENT — ENCOUNTER SYMPTOMS
DIARRHEA: 0
VOMITING: 1
SORE THROAT: 0
ABDOMINAL PAIN: 0
VOICE CHANGE: 0
BLOOD IN STOOL: 0
CHEST TIGHTNESS: 0
SHORTNESS OF BREATH: 0
PHOTOPHOBIA: 0
COUGH: 0
BACK PAIN: 0
TROUBLE SWALLOWING: 0
FACIAL SWELLING: 0
NAUSEA: 1
WHEEZING: 0

## 2018-07-07 NOTE — ED PROVIDER NOTES
[facility] ED  eMERGENCY dEPARTMENT eNCOUnter      Pt Name: Yoandy Bean  MRN: [id number]  [place] 2000  Date of evaluation: 2018  Provider: Dane Gibbs Dr       Chief Complaint   Patient presents with    Nausea     dry-heaving starting a couple weeks ago but it worsening- pt is approx 8 weeks pregnant- Fabiola [place] prescribed 4mg zofran \"its not working\"       HISTORY OF PRESENT ILLNESS    Thong Moore is a 19 y.o. female who presents to the emergency department from home with complaints of multiple episodes of vomiting for the past week. She is  at approx 8wks gestation. No fever, no abdominal pain, no vaginal bleeding or vaginal discharge. She is scheduled for an ultrasound 3 days from now. She states that despite Zofran was prescribed to her by her OB she continues to have daily vomiting, usually up to 2 episodes of vomiting per day. Triage notes and Nursing notes were reviewed by myself. Any discrepancies are addressed above.     PAST MEDICAL HISTORY     Past Medical History:   Diagnosis Date    Asthma     Colitis     Depression     Dysmenorrhea in the adolescent     Facial abscess     was cyst left cheek    Rectal bleed     Vertigo        SURGICAL HISTORY       Past Surgical History:   Procedure Laterality Date    ABSCESS DRAINAGE Left     once left cheek    COLONOSCOPY      COLONOSCOPY  2013        UPPER GASTROINTESTINAL ENDOSCOPY  2013           CURRENT MEDICATIONS       Previous Medications    ONDANSETRON (ZOFRAN) 4 MG TABLET    Take 1 tablet by mouth daily as needed for Nausea or Vomiting    PRENATAL MV-MIN-FE FUM-FA-DHA (PRENATAL 1 PO)    Take by mouth       ALLERGIES     Silver and Soap    FAMILY HISTORY       Family History   Problem Relation Age of Onset    Allergies Mother     Crohn's Disease Mother     High Cholesterol Father     Migraines Father     Stroke Father     Heart Disease

## 2018-07-07 NOTE — ED NOTES
Patient resting in bed. Alert, oriented and pleasant. States \"feeling a lot better\" Pt is PO challenged with water. No issues at this time.      Harlan Bumpers, RN  07/07/18 3123

## 2018-07-11 ENCOUNTER — PROCEDURE VISIT (OUTPATIENT)
Dept: OBGYN | Age: 18
End: 2018-07-11
Payer: COMMERCIAL

## 2018-07-11 DIAGNOSIS — Z3A.10 10 WEEKS GESTATION OF PREGNANCY: ICD-10-CM

## 2018-07-11 DIAGNOSIS — O36.80X0 PREGNANCY WITH UNCERTAIN FETAL VIABILITY, SINGLE OR UNSPECIFIED FETUS: Primary | ICD-10-CM

## 2018-07-11 LAB
CRL: 3.6 CM
SAC DIAMETER: NORMAL CM

## 2018-07-11 PROCEDURE — 76801 OB US < 14 WKS SINGLE FETUS: CPT | Performed by: OBSTETRICS & GYNECOLOGY

## 2018-07-12 ENCOUNTER — ROUTINE PRENATAL (OUTPATIENT)
Dept: OBGYN | Age: 18
End: 2018-07-12
Payer: COMMERCIAL

## 2018-07-12 DIAGNOSIS — Z3A.10 10 WEEKS GESTATION OF PREGNANCY: Primary | ICD-10-CM

## 2018-07-12 PROCEDURE — G8428 CUR MEDS NOT DOCUMENT: HCPCS | Performed by: ADVANCED PRACTICE MIDWIFE

## 2018-07-12 PROCEDURE — 99211 OFF/OP EST MAY X REQ PHY/QHP: CPT | Performed by: ADVANCED PRACTICE MIDWIFE

## 2018-07-12 PROCEDURE — G8417 CALC BMI ABV UP PARAM F/U: HCPCS | Performed by: ADVANCED PRACTICE MIDWIFE

## 2018-07-18 ENCOUNTER — TELEPHONE (OUTPATIENT)
Dept: OBGYN | Age: 18
End: 2018-07-18

## 2018-08-07 ENCOUNTER — HOSPITAL ENCOUNTER (OUTPATIENT)
Age: 18
Setting detail: SPECIMEN
Discharge: HOME | End: 2018-08-07
Payer: COMMERCIAL

## 2018-08-07 ENCOUNTER — ROUTINE PRENATAL (OUTPATIENT)
Dept: OBGYN | Age: 18
End: 2018-08-07
Payer: COMMERCIAL

## 2018-08-07 VITALS — WEIGHT: 171 LBS | SYSTOLIC BLOOD PRESSURE: 118 MMHG | BODY MASS INDEX: 32.31 KG/M2 | DIASTOLIC BLOOD PRESSURE: 74 MMHG

## 2018-08-07 DIAGNOSIS — N76.0 ACUTE VAGINITIS: ICD-10-CM

## 2018-08-07 DIAGNOSIS — Z34.02 ENCOUNTER FOR SUPERVISION OF NORMAL FIRST PREGNANCY IN SECOND TRIMESTER: Primary | ICD-10-CM

## 2018-08-07 DIAGNOSIS — Z3A.14 14 WEEKS GESTATION OF PREGNANCY: ICD-10-CM

## 2018-08-07 PROCEDURE — G8417 CALC BMI ABV UP PARAM F/U: HCPCS | Performed by: ADVANCED PRACTICE MIDWIFE

## 2018-08-07 PROCEDURE — 99213 OFFICE O/P EST LOW 20 MIN: CPT | Performed by: ADVANCED PRACTICE MIDWIFE

## 2018-08-07 PROCEDURE — 87070 CULTURE OTHR SPECIMN AEROBIC: CPT

## 2018-08-07 PROCEDURE — G8427 DOCREV CUR MEDS BY ELIG CLIN: HCPCS | Performed by: ADVANCED PRACTICE MIDWIFE

## 2018-08-07 PROCEDURE — 4004F PT TOBACCO SCREEN RCVD TLK: CPT | Performed by: ADVANCED PRACTICE MIDWIFE

## 2018-08-07 NOTE — PROGRESS NOTES
Yoandy Bean is here at 14w3d for:    Chief Complaint   Patient presents with    Routine Prenatal Visit       Estimated Due Date: Estimated Date of Delivery: 19    OB History    Para Term  AB Living   1             SAB TAB Ectopic Molar Multiple Live Births                    # Outcome Date GA Lbr Dionisio/2nd Weight Sex Delivery Anes PTL Lv   1 Current                    Past Medical History:   Diagnosis Date    Asthma     Colitis     Depression     Dysmenorrhea in the adolescent     Facial abscess     was cyst left cheek    Rectal bleed     Vertigo        Past Surgical History:   Procedure Laterality Date    ABSCESS DRAINAGE Left     once left cheek    COLONOSCOPY      COLONOSCOPY  2013        UPPER GASTROINTESTINAL ENDOSCOPY  2013           History   Smoking Status    Current Every Day Smoker    Packs/day: 0.50    Years: 2.00    Types: Cigarettes   Smokeless Tobacco    Never Used     Comment: trying to quit on own        History   Alcohol Use No     Comment: occasionally with family, hardly ever       No results found for this visit on 18. Vitals:  /74   Wt 171 lb (77.6 kg)   LMP 05/10/2018   BMI 32.31 kg/m²   Estimated body mass index is 32.31 kg/m² as calculated from the following:    Height as of 18: 5' 1\" (1.549 m). Weight as of this encounter: 171 lb (77.6 kg). Labs:    No results found for this visit on 18. HPI: here for routine ob visit, denies c/o     PT denies fever, chills, nausea and vomiting       Abdomen: enlarged, gravid, soft, nontender  Total body exam completed please see prenatal physical exam tab in visit navigator   Condyloma at 6 oclock introitus     See prenatal vital sign section and fetal assessment section    ASSESSMENT & Plan    Diagnosis Orders   1. Encounter for supervision of normal first pregnancy in second trimester     2. 14 weeks gestation of pregnancy     3.  Acute vaginitis

## 2018-08-11 LAB
CULTURE: NORMAL
Lab: NORMAL
SPECIMEN DESCRIPTION: NORMAL
STATUS: NORMAL

## 2018-08-13 ENCOUNTER — TELEPHONE (OUTPATIENT)
Dept: OBGYN | Age: 18
End: 2018-08-13

## 2018-08-13 DIAGNOSIS — O21.9 NAUSEA AND VOMITING DURING PREGNANCY: Primary | ICD-10-CM

## 2018-08-13 RX ORDER — PROMETHAZINE HYDROCHLORIDE 25 MG/1
25 TABLET ORAL EVERY 6 HOURS PRN
Qty: 30 TABLET | Refills: 0 | Status: SHIPPED | OUTPATIENT
Start: 2018-08-13 | End: 2018-08-20

## 2018-08-13 NOTE — TELEPHONE ENCOUNTER
Called pt and LM letting her know we sent this to her pharmacy but that this medication would make her sleepy

## 2018-08-14 ENCOUNTER — TELEPHONE (OUTPATIENT)
Dept: OBGYN | Age: 18
End: 2018-08-14

## 2018-08-14 NOTE — TELEPHONE ENCOUNTER
Patient c/o nausea and vomiting. Was started on Phenergan yesterday however does not feel that it is working. Advice? [phone].

## 2018-08-15 ENCOUNTER — OFFICE VISIT (OUTPATIENT)
Dept: OBGYN | Age: 18
End: 2018-08-15
Payer: COMMERCIAL

## 2018-08-15 ENCOUNTER — ROUTINE PRENATAL (OUTPATIENT)
Dept: OBGYN | Age: 18
End: 2018-08-15
Payer: COMMERCIAL

## 2018-08-15 VITALS — SYSTOLIC BLOOD PRESSURE: 110 MMHG | DIASTOLIC BLOOD PRESSURE: 60 MMHG | WEIGHT: 164.4 LBS | BODY MASS INDEX: 31.06 KG/M2

## 2018-08-15 VITALS
BODY MASS INDEX: 31.04 KG/M2 | SYSTOLIC BLOOD PRESSURE: 110 MMHG | HEIGHT: 61 IN | WEIGHT: 164.4 LBS | DIASTOLIC BLOOD PRESSURE: 60 MMHG

## 2018-08-15 DIAGNOSIS — O21.9 NAUSEA AND VOMITING DURING PREGNANCY: Primary | ICD-10-CM

## 2018-08-15 DIAGNOSIS — R11.10 HYPEREMESIS: Primary | ICD-10-CM

## 2018-08-15 PROCEDURE — G8417 CALC BMI ABV UP PARAM F/U: HCPCS | Performed by: ADVANCED PRACTICE MIDWIFE

## 2018-08-15 PROCEDURE — 4004F PT TOBACCO SCREEN RCVD TLK: CPT | Performed by: ADVANCED PRACTICE MIDWIFE

## 2018-08-15 PROCEDURE — 99213 OFFICE O/P EST LOW 20 MIN: CPT | Performed by: ADVANCED PRACTICE MIDWIFE

## 2018-08-15 PROCEDURE — G8427 DOCREV CUR MEDS BY ELIG CLIN: HCPCS | Performed by: ADVANCED PRACTICE MIDWIFE

## 2018-08-15 RX ORDER — METOCLOPRAMIDE 10 MG/1
TABLET ORAL
Qty: 30 TABLET | Refills: 1 | Status: SHIPPED | OUTPATIENT
Start: 2018-08-15 | End: 2018-09-04

## 2018-08-17 ENCOUNTER — HOSPITAL ENCOUNTER (EMERGENCY)
Age: 18
Discharge: HOME | End: 2018-08-17
Attending: EMERGENCY MEDICINE
Payer: COMMERCIAL

## 2018-08-17 VITALS
TEMPERATURE: 97.1 F | HEART RATE: 83 BPM | RESPIRATION RATE: 16 BRPM | BODY MASS INDEX: 30.99 KG/M2 | SYSTOLIC BLOOD PRESSURE: 102 MMHG | DIASTOLIC BLOOD PRESSURE: 66 MMHG | WEIGHT: 164 LBS | OXYGEN SATURATION: 97 %

## 2018-08-17 DIAGNOSIS — N30.00 ACUTE CYSTITIS WITHOUT HEMATURIA: Primary | ICD-10-CM

## 2018-08-17 LAB
-: NORMAL
ALBUMIN SERPL-MCNC: 3.6 G/DL (ref 3.5–5.2)
ALBUMIN/GLOBULIN RATIO: 1.3 (ref 1–2.5)
ALP BLD-CCNC: 105 U/L (ref 35–104)
ALT SERPL-CCNC: 12 U/L (ref 5–33)
AMORPHOUS: NORMAL
ANION GAP SERPL CALCULATED.3IONS-SCNC: 14 MMOL/L (ref 9–17)
AST SERPL-CCNC: 14 U/L
BACTERIA: NORMAL
BILIRUB SERPL-MCNC: 0.35 MG/DL (ref 0.3–1.2)
BILIRUBIN URINE: NEGATIVE
BUN BLDV-MCNC: 4 MG/DL (ref 6–20)
BUN/CREAT BLD: 12 (ref 9–20)
CALCIUM SERPL-MCNC: 9.5 MG/DL (ref 8.6–10.4)
CASTS UA: NORMAL /LPF
CHLORIDE BLD-SCNC: 101 MMOL/L (ref 98–107)
CO2: 20 MMOL/L (ref 20–31)
COLOR: YELLOW
COMMENT UA: ABNORMAL
CREAT SERPL-MCNC: 0.34 MG/DL (ref 0.5–0.9)
CRYSTALS, UA: NORMAL /HPF
EPITHELIAL CELLS UA: NORMAL /HPF (ref 0–25)
GFR AFRICAN AMERICAN: >60 ML/MIN
GFR NON-AFRICAN AMERICAN: >60 ML/MIN
GFR SERPL CREATININE-BSD FRML MDRD: ABNORMAL ML/MIN/{1.73_M2}
GFR SERPL CREATININE-BSD FRML MDRD: ABNORMAL ML/MIN/{1.73_M2}
GLUCOSE BLD-MCNC: 101 MG/DL (ref 70–99)
GLUCOSE URINE: NEGATIVE
HCT VFR BLD CALC: 36.3 % (ref 36.3–47.1)
HEMOGLOBIN: 12.6 G/DL (ref 11.9–15.1)
KETONES, URINE: ABNORMAL
LEUKOCYTE ESTERASE, URINE: ABNORMAL
MCH RBC QN AUTO: 28.8 PG (ref 25.2–33.5)
MCHC RBC AUTO-ENTMCNC: 34.7 G/DL (ref 28.4–34.8)
MCV RBC AUTO: 82.9 FL (ref 82.6–102.9)
MUCUS: NORMAL
NITRITE, URINE: NEGATIVE
NRBC AUTOMATED: 0 PER 100 WBC
OTHER OBSERVATIONS UA: NORMAL
PDW BLD-RTO: 14.1 % (ref 11.8–14.4)
PH UA: 6.5 (ref 5–9)
PLATELET # BLD: 363 K/UL (ref 138–453)
PMV BLD AUTO: 11.4 FL (ref 8.1–13.5)
POTASSIUM SERPL-SCNC: 3.7 MMOL/L (ref 3.7–5.3)
PROTEIN UA: NEGATIVE
RBC # BLD: 4.38 M/UL (ref 3.95–5.11)
RBC UA: NORMAL /HPF (ref 0–2)
RENAL EPITHELIAL, UA: NORMAL /HPF
SODIUM BLD-SCNC: 135 MMOL/L (ref 135–144)
SPECIFIC GRAVITY UA: <1.005 (ref 1.01–1.02)
TOTAL PROTEIN: 6.3 G/DL (ref 6.4–8.3)
TRICHOMONAS: NORMAL
TURBIDITY: CLEAR
URINE HGB: NEGATIVE
UROBILINOGEN, URINE: NORMAL
WBC # BLD: 15.6 K/UL (ref 4.5–13.5)
WBC UA: NORMAL /HPF (ref 0–5)
YEAST: NORMAL

## 2018-08-17 PROCEDURE — 2500000003 HC RX 250 WO HCPCS: Performed by: EMERGENCY MEDICINE

## 2018-08-17 PROCEDURE — 36415 COLL VENOUS BLD VENIPUNCTURE: CPT

## 2018-08-17 PROCEDURE — 81001 URINALYSIS AUTO W/SCOPE: CPT

## 2018-08-17 PROCEDURE — 2580000003 HC RX 258: Performed by: EMERGENCY MEDICINE

## 2018-08-17 PROCEDURE — 85027 COMPLETE CBC AUTOMATED: CPT

## 2018-08-17 PROCEDURE — 6360000002 HC RX W HCPCS: Performed by: EMERGENCY MEDICINE

## 2018-08-17 PROCEDURE — 96365 THER/PROPH/DIAG IV INF INIT: CPT

## 2018-08-17 PROCEDURE — 6370000000 HC RX 637 (ALT 250 FOR IP): Performed by: EMERGENCY MEDICINE

## 2018-08-17 PROCEDURE — 96375 TX/PRO/DX INJ NEW DRUG ADDON: CPT

## 2018-08-17 PROCEDURE — 80053 COMPREHEN METABOLIC PANEL: CPT

## 2018-08-17 PROCEDURE — 99284 EMERGENCY DEPT VISIT MOD MDM: CPT

## 2018-08-17 PROCEDURE — 96366 THER/PROPH/DIAG IV INF ADDON: CPT

## 2018-08-17 RX ORDER — CEPHALEXIN 500 MG/1
500 CAPSULE ORAL 3 TIMES DAILY
Qty: 21 CAPSULE | Refills: 0 | Status: SHIPPED | OUTPATIENT
Start: 2018-08-17 | End: 2018-09-04 | Stop reason: ALTCHOICE

## 2018-08-17 RX ORDER — ONDANSETRON 2 MG/ML
4 INJECTION INTRAMUSCULAR; INTRAVENOUS ONCE
Status: COMPLETED | OUTPATIENT
Start: 2018-08-17 | End: 2018-08-17

## 2018-08-17 RX ORDER — CEPHALEXIN 500 MG/1
500 CAPSULE ORAL ONCE
Status: COMPLETED | OUTPATIENT
Start: 2018-08-17 | End: 2018-08-17

## 2018-08-17 RX ADMIN — CEPHALEXIN 500 MG: 500 CAPSULE ORAL at 17:59

## 2018-08-17 RX ADMIN — ONDANSETRON 4 MG: 2 INJECTION INTRAMUSCULAR; INTRAVENOUS at 15:44

## 2018-08-17 RX ADMIN — FOLIC ACID: 5 INJECTION, SOLUTION INTRAMUSCULAR; INTRAVENOUS; SUBCUTANEOUS at 15:45

## 2018-08-17 ASSESSMENT — ENCOUNTER SYMPTOMS
NAUSEA: 0
CONSTIPATION: 0
ABDOMINAL PAIN: 0
RESPIRATORY NEGATIVE: 1
DIARRHEA: 0
VOMITING: 1
ABDOMINAL DISTENTION: 0

## 2018-08-17 NOTE — ED NOTES
[facility] Pt states understanding.       Em Roberts RN  08/17/18 [id number]

## 2018-08-17 NOTE — ED NOTES
Pt was able to keep down 2 packs of peanut butter and crackers and a can of sprite.  Pt states she is feeling much better       Em Roberts RN  08/17/18 [id number]

## 2018-08-17 NOTE — ED PROVIDER NOTES
[facility] ED  eMERGENCY dEPARTMENT eNCOUnter      Pt Name: Abiel Huston  MRN: [id number]  [place] 2000  Date of evaluation: 2018  Provider: Dana Carey MD    CHIEF COMPLAINT       Chief Complaint   Patient presents with    Emesis     ongoing for 1 week. Pt states she is 15 weeks pregnant         HISTORY OF PRESENT ILLNESS   (Location/Symptom, Timing/Onset, Context/Setting, Quality, Duration, Modifying Factors, Severity)  Note limiting factors. Abiel Huston is a 19 y.o. female who presents to the emergency department      HPIThis is a 68-year-old  1 para 0 at 15 weeks gestation by previous ultrasound that presents with vomiting for the last 5 days. She has had some mild morning sickness but nothing to this degree. She's not been able to keep any food down. She denies any fevers or chills. She denies any dysuria or urinary symptoms. She denies any suprapubic or flank pain. She has no vaginal discharge. Nursing Notes were reviewed. REVIEW OF SYSTEMS    (2-9 systems for level 4, 10 or more for level 5)     Review of Systems   Constitutional: Positive for activity change, appetite change and fatigue. HENT: Negative. Respiratory: Negative. Cardiovascular: Negative. Gastrointestinal: Positive for vomiting. Negative for abdominal distention, abdominal pain, constipation, diarrhea and nausea. Genitourinary: Negative. Musculoskeletal: Negative. Skin: Negative. Neurological: Negative. Hematological: Negative. Except as noted above the remainder of the review of systems was reviewed and negative.        PAST MEDICAL HISTORY     Past Medical History:   Diagnosis Date    Asthma     Colitis     Depression     Dysmenorrhea in the adolescent     Facial abscess     was cyst left cheek    Rectal bleed     Vertigo          SURGICAL HISTORY       Past Surgical History:   Procedure Laterality Date    ABSCESS DRAINAGE Left     once left cheek    COLONOSCOPY      COLONOSCOPY  12/05/2013        UPPER GASTROINTESTINAL ENDOSCOPY  12/05/2013             CURRENT MEDICATIONS       Discharge Medication List as of 8/17/2018  7:10 PM      CONTINUE these medications which have NOT CHANGED    Details   metoclopramide (REGLAN) 10 MG tablet Three times a day as needed for nausea and vomitting, Disp-30 tablet, R-1Normal      Prenatal MV-Min-Fe Fum-FA-DHA (PRENATAL 1 PO) Take by mouthHistorical Med      promethazine (PHENERGAN) 25 MG tablet Take 1 tablet by mouth every 6 hours as needed for Nausea, Disp-30 tablet, R-0Normal      doxylamine-pyridoxine (DICLEGIS) 10-10 MG TBEC Take 2 tablets by mouth every evening, Disp-15 tablet, R-0Print      ondansetron (ZOFRAN) 4 MG tablet Take 1 tablet by mouth daily as needed for Nausea or Vomiting, Disp-30 tablet, R-2Normal             ALLERGIES     Silver and Soap    FAMILY HISTORY       Family History   Problem Relation Age of Onset    Allergies Mother     Crohn's Disease Mother     High Cholesterol Father     Migraines Father     Stroke Father     Heart Disease Father     Cancer Maternal Grandmother     Cancer Maternal Grandfather     Migraines Brother           SOCIAL HISTORY       Social History     Social History    Marital status: Single     Spouse name: N/A    Number of children: N/A    Years of education: N/A     Social History Main Topics    Smoking status: Current Every Day Smoker     Packs/day: 0.50     Years: 2.00     Types: Cigarettes    Smokeless tobacco: Never Used      Comment: trying to quit on own    Alcohol use No      Comment: occasionally with family, hardly ever    Drug use: No    Sexual activity: Yes     Partners: Male     Other Topics Concern    None     Social History Narrative    None       SCREENINGS             PHYSICAL EXAM    (up to 7 for level 4, 8 or more for level 5)     ED Triage Vitals [08/17/18 1441]   BP Temp Temp Source Pulse Resp SpO2 Height Weight   (!) 122/53 97.1 °F (36.2 °C) Tympanic 104 16 100 % -- 164 lb (74.4 kg)       Physical Exam   Constitutional: She is oriented to person, place, and time. She appears well-developed and well-nourished. HENT:   Head: Normocephalic and atraumatic. Right Ear: External ear normal.   Left Ear: External ear normal.   Nose: Nose normal.   Eyes: Pupils are equal, round, and reactive to light. Conjunctivae and EOM are normal.   Neck: Normal range of motion. Neck supple. Cardiovascular: Normal rate, regular rhythm and normal heart sounds. Pulmonary/Chest: Effort normal and breath sounds normal.   Abdominal: Soft. Bowel sounds are normal.   Musculoskeletal: Normal range of motion. Neurological: She is alert and oriented to person, place, and time. Skin: Skin is warm and dry.        DIAGNOSTIC RESULTS     EKG: All EKG's are interpreted by the Emergency Department Physician who either signs or Co-signs this chart in the absence of a cardiologist.      RADIOLOGY:   Non-plain film images such as CT, Ultrasound and MRI are read by the radiologist. Plain radiographic images are visualized and preliminarily interpreted by the emergency physician with the below findings:      Interpretation per the Radiologist below, if available at the time of this note:    No orders to display         ED BEDSIDE ULTRASOUND:   Performed by ED Physician - none    LABS:  Labs Reviewed   CBC - Abnormal; Notable for the following:        Result Value    WBC 15.6 (*)     All other components within normal limits   COMPREHENSIVE METABOLIC PANEL - Abnormal; Notable for the following:     Glucose 101 (*)     BUN 4 (*)     CREATININE 0.34 (*)     Alkaline Phosphatase 105 (*)     Total Protein 6.3 (*)     All other components within normal limits   URINALYSIS - Abnormal; Notable for the following:     Ketones, Urine TRACE (*)     Specific Gravity, UA <1.005 (*)     Leukocyte Esterase, Urine SMALL (*)     All other components within normal limits   MICROSCOPIC URINALYSIS       All other labs were within normal range or not returned as of this dictation. EMERGENCY DEPARTMENT COURSE and DIFFERENTIAL DIAGNOSIS/MDM:   Vitals:    Vitals:    08/17/18 1746 08/17/18 1802 08/17/18 1831 08/17/18 1901   BP: 97/76 114/65 (!) 105/58 102/66   Pulse:       Resp:       Temp:       TempSrc:       SpO2:       Weight:             MDM          Procedures    FINAL IMPRESSION      1. Acute cystitis without hematuria          DISPOSITION/PLAN   DISPOSITION Decision To Discharge 08/17/2018 05:06:14 PM      PATIENT REFERRED TO:  No follow-up provider specified. DISCHARGE MEDICATIONS:  Discharge Medication List as of 8/17/2018  7:10 PM      START taking these medications    Details   cephALEXin (KEFLEX) 500 MG capsule Take 1 capsule by mouth 3 times daily, Disp-21 capsule, R-0Print                    Summation      Patient Course:      ED Medications administered this visit:    Medications   ondansetron (ZOFRAN) injection 4 mg (4 mg Intravenous Given 8/17/18 1544)   sodium chloride 0.9 % 2,732 mL with folic acid 1 mg, adult multi-vitamin with vitamin k 10 mL, thiamine 100 mg ( Intravenous Stopped 8/17/18 1831)   cephALEXin (KEFLEX) capsule 500 mg (500 mg Oral Given 8/17/18 1759)       New Prescriptions from this visit:    Discharge Medication List as of 8/17/2018  7:10 PM      START taking these medications    Details   cephALEXin (KEFLEX) 500 MG capsule Take 1 capsule by mouth 3 times daily, Disp-21 capsule, R-0Print             Follow-up:  No follow-up provider specified. Final Impression:   1.  Acute cystitis without hematuria               (Please note that portions of this note were completed with a voice recognition program.  Efforts were made to edit the dictations but occasionally words are mis-transcribed.)           Anibal Torre MD  08/22/18 [address] [address] [place], MD  08/23/18 [address]  08/23/18 5725

## 2018-08-20 ENCOUNTER — ROUTINE PRENATAL (OUTPATIENT)
Dept: OBGYN | Age: 18
End: 2018-08-20
Payer: COMMERCIAL

## 2018-08-20 ENCOUNTER — TELEPHONE (OUTPATIENT)
Dept: OBGYN | Age: 18
End: 2018-08-20

## 2018-08-20 VITALS — WEIGHT: 164.2 LBS | BODY MASS INDEX: 31.03 KG/M2 | DIASTOLIC BLOOD PRESSURE: 60 MMHG | SYSTOLIC BLOOD PRESSURE: 110 MMHG

## 2018-08-20 DIAGNOSIS — O21.9 NAUSEA AND VOMITING DURING PREGNANCY: Primary | ICD-10-CM

## 2018-08-20 PROCEDURE — G8417 CALC BMI ABV UP PARAM F/U: HCPCS | Performed by: ADVANCED PRACTICE MIDWIFE

## 2018-08-20 PROCEDURE — G8427 DOCREV CUR MEDS BY ELIG CLIN: HCPCS | Performed by: ADVANCED PRACTICE MIDWIFE

## 2018-08-20 PROCEDURE — 99213 OFFICE O/P EST LOW 20 MIN: CPT | Performed by: ADVANCED PRACTICE MIDWIFE

## 2018-08-20 PROCEDURE — 4004F PT TOBACCO SCREEN RCVD TLK: CPT | Performed by: ADVANCED PRACTICE MIDWIFE

## 2018-08-20 NOTE — PROGRESS NOTES
Here for continued surveillance of NVP, pt not spilling ketones today and weight loss has stabilized, still \"miserable\", will attempt [facility] referral for continuous pump of zofran (pt thinks worked better than reglan)

## 2018-08-20 NOTE — TELEPHONE ENCOUNTER
Hyperemesis, please refer to [facility].   preferrably for zofran pump but if that wont be covered could try reglan

## 2018-08-25 ENCOUNTER — TELEPHONE (OUTPATIENT)
Dept: OBGYN | Age: 18
End: 2018-08-25

## 2018-09-04 ENCOUNTER — ROUTINE PRENATAL (OUTPATIENT)
Dept: OBGYN | Age: 18
End: 2018-09-04
Payer: COMMERCIAL

## 2018-09-04 VITALS — SYSTOLIC BLOOD PRESSURE: 110 MMHG | DIASTOLIC BLOOD PRESSURE: 64 MMHG | WEIGHT: 167 LBS | BODY MASS INDEX: 31.55 KG/M2

## 2018-09-04 DIAGNOSIS — Z34.02 ENCOUNTER FOR SUPERVISION OF NORMAL FIRST PREGNANCY IN SECOND TRIMESTER: Primary | ICD-10-CM

## 2018-09-04 DIAGNOSIS — Z3A.18 18 WEEKS GESTATION OF PREGNANCY: ICD-10-CM

## 2018-09-04 PROCEDURE — G8417 CALC BMI ABV UP PARAM F/U: HCPCS | Performed by: ADVANCED PRACTICE MIDWIFE

## 2018-09-04 PROCEDURE — G8427 DOCREV CUR MEDS BY ELIG CLIN: HCPCS | Performed by: ADVANCED PRACTICE MIDWIFE

## 2018-09-04 PROCEDURE — 99213 OFFICE O/P EST LOW 20 MIN: CPT | Performed by: ADVANCED PRACTICE MIDWIFE

## 2018-09-04 PROCEDURE — 4004F PT TOBACCO SCREEN RCVD TLK: CPT | Performed by: ADVANCED PRACTICE MIDWIFE

## 2018-09-04 NOTE — PROGRESS NOTES
Octavia Call is here at 18w3d for:    Chief Complaint   Patient presents with    Routine Prenatal Visit       Estimated Due Date: Estimated Date of Delivery: 19    OB History    Para Term  AB Living   1             SAB TAB Ectopic Molar Multiple Live Births                    # Outcome Date GA Lbr Dionisio/2nd Weight Sex Delivery Anes PTL Lv   1 Current                    Past Medical History:   Diagnosis Date    Asthma     Colitis     Depression     Dysmenorrhea in the adolescent     Facial abscess     was cyst left cheek    Rectal bleed     Vertigo        Past Surgical History:   Procedure Laterality Date    ABSCESS DRAINAGE Left     once left cheek    COLONOSCOPY      COLONOSCOPY  2013        UPPER GASTROINTESTINAL ENDOSCOPY  2013           History   Smoking Status    Current Every Day Smoker    Packs/day: 0.50    Years: 2.00    Types: Cigarettes   Smokeless Tobacco    Never Used     Comment: trying to quit on own        History   Alcohol Use No     Comment: occasionally with family, hardly ever       No results found for this visit on 18. Vitals:  /64   Wt 167 lb (75.8 kg)   LMP 05/10/2018   BMI 31.55 kg/m²   Estimated body mass index is 31.55 kg/m² as calculated from the following:    Height as of 8/15/18: 5' 1\" (1.549 m). Weight as of this encounter: 167 lb (75.8 kg). Labs:    No results found for this visit on 18. HPI: here for routine ob visit, NVP better with pump     PT denies fever, chills, nausea and vomiting       Abdomen: enlarged, gravid, soft, nontender     See prenatal vital sign section and fetal assessment section    ASSESSMENT & Plan    Diagnosis Orders   1. Encounter for supervision of normal first pregnancy in second trimester     2. 18 weeks gestation of pregnancy               I have discontinued Ms. Laws's doxylamine-pyridoxine, metoclopramide, and cephALEXin.  I am also having her maintain her Prenatal MV-Min-Fe Fum-FA-DHA (PRENATAL 1 PO) and ondansetron. Return in about 2 weeks (around 9/18/2018) for ob 20wkus. There are no Patient Instructions on file for this visit.           Sergio Keyes,9/4/2018 3:40 PM

## 2018-09-12 ENCOUNTER — TELEPHONE (OUTPATIENT)
Dept: OBGYN | Age: 18
End: 2018-09-12

## 2018-09-24 ENCOUNTER — ROUTINE PRENATAL (OUTPATIENT)
Dept: OBGYN | Age: 18
End: 2018-09-24
Payer: COMMERCIAL

## 2018-09-24 ENCOUNTER — HOSPITAL ENCOUNTER (OUTPATIENT)
Age: 18
Setting detail: SPECIMEN
Discharge: HOME | End: 2018-09-24
Payer: COMMERCIAL

## 2018-09-24 VITALS — WEIGHT: 172 LBS | SYSTOLIC BLOOD PRESSURE: 110 MMHG | DIASTOLIC BLOOD PRESSURE: 62 MMHG | BODY MASS INDEX: 32.5 KG/M2

## 2018-09-24 DIAGNOSIS — N76.0 ACUTE VAGINITIS: ICD-10-CM

## 2018-09-24 DIAGNOSIS — Z3A.21 21 WEEKS GESTATION OF PREGNANCY: ICD-10-CM

## 2018-09-24 DIAGNOSIS — Z34.02 ENCOUNTER FOR SUPERVISION OF NORMAL FIRST PREGNANCY IN SECOND TRIMESTER: ICD-10-CM

## 2018-09-24 DIAGNOSIS — Z36.3 ANTENATAL SCREENING FOR MALFORMATION USING ULTRASONICS: Primary | ICD-10-CM

## 2018-09-24 LAB
ABDOMINAL CIRCUMFERENCE: 15 CM
BIPARIETAL DIAMETER: 4.8 CM
DIRECT EXAM: ABNORMAL
ESTIMATED FETAL WEIGHT: 342 GRAMS
FEMORAL DIAMETER: NORMAL CM
HC/AC: 1.17
HEAD CIRCUMFERENCE: 17.6 CM
Lab: ABNORMAL
SPECIMEN DESCRIPTION: ABNORMAL
STATUS: ABNORMAL

## 2018-09-24 PROCEDURE — 87491 CHLMYD TRACH DNA AMP PROBE: CPT

## 2018-09-24 PROCEDURE — 87591 N.GONORRHOEAE DNA AMP PROB: CPT

## 2018-09-24 PROCEDURE — 4004F PT TOBACCO SCREEN RCVD TLK: CPT | Performed by: ADVANCED PRACTICE MIDWIFE

## 2018-09-24 PROCEDURE — G8428 CUR MEDS NOT DOCUMENT: HCPCS | Performed by: ADVANCED PRACTICE MIDWIFE

## 2018-09-24 PROCEDURE — 76805 OB US >/= 14 WKS SNGL FETUS: CPT | Performed by: OBSTETRICS & GYNECOLOGY

## 2018-09-24 PROCEDURE — 99213 OFFICE O/P EST LOW 20 MIN: CPT | Performed by: ADVANCED PRACTICE MIDWIFE

## 2018-09-24 PROCEDURE — 87510 GARDNER VAG DNA DIR PROBE: CPT

## 2018-09-24 PROCEDURE — 87660 TRICHOMONAS VAGIN DIR PROBE: CPT

## 2018-09-24 PROCEDURE — G8417 CALC BMI ABV UP PARAM F/U: HCPCS | Performed by: ADVANCED PRACTICE MIDWIFE

## 2018-09-24 PROCEDURE — 87070 CULTURE OTHR SPECIMN AEROBIC: CPT

## 2018-09-24 PROCEDURE — 87480 CANDIDA DNA DIR PROBE: CPT

## 2018-09-24 PROCEDURE — 86403 PARTICLE AGGLUT ANTBDY SCRN: CPT

## 2018-09-24 RX ORDER — FLUCONAZOLE 150 MG/1
150 TABLET ORAL ONCE
Qty: 1 TABLET | Refills: 0 | Status: SHIPPED | OUTPATIENT
Start: 2018-09-24 | End: 2018-09-24

## 2018-09-24 NOTE — LETTER
Sukhjinder Nguyen  [address]  Phone: [phone]  Fax: AHSAN Bo CNM        September 24, 2018     Patient: Lisa Romero [facility])   YOB: 2000   Date of Visit: 9/24/2018       To Whom it May Concern:    Jad Wyatt was seen in my clinic on 9/24/2018. She may return to work on 09/25/218. If you have any questions or concerns, please don't hesitate to call.     Sincerely,         AHSAN Gonsalves CNM

## 2018-09-24 NOTE — PROGRESS NOTES
Follow up in house ultrasound. C/O vaginal itch, white vaginal discharge.
grams       HPI: here for routine ob appointment, c/o vaginitis \"really painful\"     PT denies fever, chills, nausea and vomiting       Abdomen: enlarged, gravid, soft, nontender  Genitalia: external redness, no open areas, copious thick white d/c in vaginal vault, no odor, very tender for exam     See prenatal vital sign section and fetal assessment section    ASSESSMENT & Plan    Diagnosis Orders   1.  screening for malformation using ultrasonics  US OB 14 Plus Weeks Single or First Gestation   2. 21 weeks gestation of pregnancy  US OB 14 Plus Weeks Single or First Gestation   3. Encounter for supervision of normal first pregnancy in second trimester     4. Acute vaginitis  C.trachomatis N.gonorrhoeae DNA    VAGINITIS DNA PROBE    Culture, Genital    fluconazole (DIFLUCAN) 150 MG tablet             I am having Ms. Laws start on fluconazole. I am also having her maintain her Prenatal MV-Min-Fe Fum-FA-DHA (PRENATAL 1 PO) and ondansetron. Return in about 4 weeks (around 10/22/2018) for ob. There are no Patient Instructions on file for this visit.           Louise Keyes,2018 10:57 AM

## 2018-09-25 DIAGNOSIS — B37.31 VAGINAL YEAST INFECTION: ICD-10-CM

## 2018-09-25 DIAGNOSIS — N76.0 BV (BACTERIAL VAGINOSIS): Primary | ICD-10-CM

## 2018-09-25 DIAGNOSIS — B96.89 BV (BACTERIAL VAGINOSIS): Primary | ICD-10-CM

## 2018-09-25 LAB
C TRACH DNA GENITAL QL NAA+PROBE: NEGATIVE
N. GONORRHOEAE DNA: NEGATIVE

## 2018-09-25 RX ORDER — METRONIDAZOLE 250 MG/1
250 TABLET ORAL 3 TIMES DAILY
Qty: 21 TABLET | Refills: 0 | Status: SHIPPED | OUTPATIENT
Start: 2018-09-25 | End: 2018-10-02

## 2018-09-27 DIAGNOSIS — B37.31 YEAST VAGINITIS: Primary | ICD-10-CM

## 2018-09-27 DIAGNOSIS — B99.9 INFECTION: ICD-10-CM

## 2018-09-27 LAB
CULTURE: ABNORMAL
Lab: ABNORMAL
SPECIMEN DESCRIPTION: ABNORMAL
STATUS: ABNORMAL

## 2018-09-27 RX ORDER — AMOXICILLIN 500 MG/1
500 CAPSULE ORAL 3 TIMES DAILY
Qty: 21 CAPSULE | Refills: 0 | Status: SHIPPED | OUTPATIENT
Start: 2018-09-27 | End: 2018-10-04

## 2018-10-10 ENCOUNTER — HOSPITAL ENCOUNTER (OUTPATIENT)
Age: 18
Discharge: HOME | End: 2018-10-10
Attending: OBSTETRICS & GYNECOLOGY | Admitting: OBSTETRICS & GYNECOLOGY
Payer: COMMERCIAL

## 2018-10-10 ENCOUNTER — TELEPHONE (OUTPATIENT)
Dept: OBGYN | Age: 18
End: 2018-10-10

## 2018-10-10 ENCOUNTER — PROCEDURE VISIT (OUTPATIENT)
Dept: OBGYN | Age: 18
End: 2018-10-10
Payer: COMMERCIAL

## 2018-10-10 VITALS
SYSTOLIC BLOOD PRESSURE: 108 MMHG | BODY MASS INDEX: 32.5 KG/M2 | HEART RATE: 100 BPM | TEMPERATURE: 98 F | HEIGHT: 61 IN | DIASTOLIC BLOOD PRESSURE: 46 MMHG | RESPIRATION RATE: 18 BRPM

## 2018-10-10 DIAGNOSIS — R10.9 ABDOMINAL PAIN IN PREGNANCY, SECOND TRIMESTER: ICD-10-CM

## 2018-10-10 DIAGNOSIS — N93.9 VAGINAL BLEEDING: Primary | ICD-10-CM

## 2018-10-10 DIAGNOSIS — O26.892 ABDOMINAL PAIN IN PREGNANCY, SECOND TRIMESTER: ICD-10-CM

## 2018-10-10 DIAGNOSIS — Z3A.23 23 WEEKS GESTATION OF PREGNANCY: ICD-10-CM

## 2018-10-10 DIAGNOSIS — O46.92 VAGINAL BLEEDING IN PREGNANCY, SECOND TRIMESTER: Primary | ICD-10-CM

## 2018-10-10 LAB
% FETAL BLEED: NORMAL %
-: ABNORMAL
ABDOMINAL CIRCUMFERENCE: NORMAL CM
ABSOLUTE EOS #: 0.14 K/UL (ref 0–0.44)
ABSOLUTE IMMATURE GRANULOCYTE: 0.09 K/UL (ref 0–0.3)
ABSOLUTE LYMPH #: 2.65 K/UL (ref 1.2–5.2)
ABSOLUTE MONO #: 0.65 K/UL (ref 0.1–1.4)
AMORPHOUS: ABNORMAL
BACTERIA: ABNORMAL
BASOPHILS # BLD: 0 % (ref 0–2)
BASOPHILS ABSOLUTE: 0.04 K/UL (ref 0–0.2)
BILIRUBIN URINE: NEGATIVE
BIPARIETAL DIAMETER: NORMAL CM
CASTS UA: ABNORMAL /LPF
COLOR: YELLOW
COMMENT UA: ABNORMAL
CRYSTALS, UA: ABNORMAL /HPF
DIFFERENTIAL TYPE: ABNORMAL
EOSINOPHILS RELATIVE PERCENT: 1 % (ref 1–4)
EPITHELIAL CELLS UA: ABNORMAL /HPF (ref 0–25)
ESTIMATED FETAL WEIGHT: NORMAL GRAMS
FEMORAL DIAMETER: NORMAL CM
FETAL BLEED VOLUME: NORMAL ML
FIBRINOGEN: 459 MG/DL (ref 185–451)
GLUCOSE URINE: NEGATIVE
HC/AC: NORMAL
HCT VFR BLD CALC: 29.6 % (ref 36.3–47.1)
HEAD CIRCUMFERENCE: NORMAL CM
HEMOGLOBIN: 9.7 G/DL (ref 11.9–15.1)
IMMATURE GRANULOCYTES: 1 %
INR BLD: 0.9 (ref 0.9–1.2)
KETONES, URINE: ABNORMAL
LEUKOCYTE ESTERASE, URINE: ABNORMAL
LYMPHOCYTES # BLD: 16 % (ref 25–45)
MCH RBC QN AUTO: 29.1 PG (ref 25.2–33.5)
MCHC RBC AUTO-ENTMCNC: 32.8 G/DL (ref 28.4–34.8)
MCV RBC AUTO: 88.9 FL (ref 82.6–102.9)
MONOCYTES # BLD: 4 % (ref 2–8)
MUCUS: ABNORMAL
NITRITE, URINE: NEGATIVE
NRBC AUTOMATED: 0 PER 100 WBC
OTHER OBSERVATIONS UA: ABNORMAL
PARTIAL THROMBOPLASTIN TIME: 25.7 SEC (ref 23.2–34.4)
PDW BLD-RTO: 14.6 % (ref 11.8–14.4)
PH UA: 6 (ref 5–9)
PLATELET # BLD: 372 K/UL (ref 138–453)
PLATELET ESTIMATE: ABNORMAL
PMV BLD AUTO: 9.8 FL (ref 8.1–13.5)
PROTEIN UA: NEGATIVE
PROTHROMBIN TIME: 9.4 SEC (ref 9.7–12.2)
RBC # BLD: 3.33 M/UL (ref 3.95–5.11)
RBC # BLD: ABNORMAL 10*6/UL
RBC UA: ABNORMAL /HPF (ref 0–2)
RENAL EPITHELIAL, UA: ABNORMAL /HPF
RHOGAM DOSES REQUIRED: 1
SEG NEUTROPHILS: 78 % (ref 34–64)
SEGMENTED NEUTROPHILS ABSOLUTE COUNT: 13.37 K/UL (ref 1.8–8)
SPECIFIC GRAVITY UA: 1.01 (ref 1.01–1.02)
TRICHOMONAS: ABNORMAL
TURBIDITY: CLEAR
URINE HGB: NEGATIVE
UROBILINOGEN, URINE: NORMAL
WBC # BLD: 16.9 K/UL (ref 4.5–13.5)
WBC # BLD: ABNORMAL 10*3/UL
WBC UA: ABNORMAL /HPF (ref 0–5)
YEAST: ABNORMAL

## 2018-10-10 PROCEDURE — 85025 COMPLETE CBC W/AUTO DIFF WBC: CPT

## 2018-10-10 PROCEDURE — 96372 THER/PROPH/DIAG INJ SC/IM: CPT

## 2018-10-10 PROCEDURE — 85730 THROMBOPLASTIN TIME PARTIAL: CPT

## 2018-10-10 PROCEDURE — 99215 OFFICE O/P EST HI 40 MIN: CPT

## 2018-10-10 PROCEDURE — 87086 URINE CULTURE/COLONY COUNT: CPT

## 2018-10-10 PROCEDURE — 85610 PROTHROMBIN TIME: CPT

## 2018-10-10 PROCEDURE — 86901 BLOOD TYPING SEROLOGIC RH(D): CPT

## 2018-10-10 PROCEDURE — 85460 HEMOGLOBIN FETAL: CPT

## 2018-10-10 PROCEDURE — 86850 RBC ANTIBODY SCREEN: CPT

## 2018-10-10 PROCEDURE — 81001 URINALYSIS AUTO W/SCOPE: CPT

## 2018-10-10 PROCEDURE — 36415 COLL VENOUS BLD VENIPUNCTURE: CPT

## 2018-10-10 PROCEDURE — 85384 FIBRINOGEN ACTIVITY: CPT

## 2018-10-10 PROCEDURE — 6360000002 HC RX W HCPCS: Performed by: ADVANCED PRACTICE MIDWIFE

## 2018-10-10 PROCEDURE — 76815 OB US LIMITED FETUS(S): CPT | Performed by: OBSTETRICS & GYNECOLOGY

## 2018-10-10 RX ADMIN — HUMAN RHO(D) IMMUNE GLOBULIN 300 MCG: 300 INJECTION, SOLUTION INTRAMUSCULAR at 16:07

## 2018-10-10 NOTE — TELEPHONE ENCOUNTER
Outpatient referral to [facility] for further evaluation of placenta, circumvellate?

## 2018-10-11 DIAGNOSIS — O43.111: Primary | ICD-10-CM

## 2018-10-11 LAB
CULTURE: NORMAL
Lab: NORMAL
SPECIMEN DESCRIPTION: NORMAL
STATUS: NORMAL

## 2018-10-13 LAB
ABO/RH: NORMAL
ANTIBODY SCREEN: NEGATIVE
BLD PROD TYP BPU: NORMAL
BLD PROD TYP BPU: NORMAL
DISPENSE STATUS BLOOD BANK: NORMAL
HISTORY CHECK: NORMAL
Lab: 1
TRANSFUSION STATUS: NORMAL
UNIT DIVISION: 0
UNIT NUMBER: NORMAL

## 2018-10-15 ENCOUNTER — ROUTINE PRENATAL (OUTPATIENT)
Dept: OBGYN | Age: 18
End: 2018-10-15
Payer: COMMERCIAL

## 2018-10-15 ENCOUNTER — HOSPITAL ENCOUNTER (OUTPATIENT)
Age: 18
Setting detail: SPECIMEN
Discharge: HOME | End: 2018-10-15
Payer: COMMERCIAL

## 2018-10-15 ENCOUNTER — TELEPHONE (OUTPATIENT)
Dept: OBGYN | Age: 18
End: 2018-10-15

## 2018-10-15 DIAGNOSIS — Z3A.24 24 WEEKS GESTATION OF PREGNANCY: ICD-10-CM

## 2018-10-15 DIAGNOSIS — R10.9 ABDOMINAL CRAMPING AFFECTING PREGNANCY: ICD-10-CM

## 2018-10-15 DIAGNOSIS — O26.899 ABDOMINAL CRAMPING AFFECTING PREGNANCY: ICD-10-CM

## 2018-10-15 DIAGNOSIS — N93.9 VAGINAL BLEEDING: Primary | ICD-10-CM

## 2018-10-15 LAB
CRL: NORMAL CM
SAC DIAMETER: NORMAL CM

## 2018-10-15 PROCEDURE — G8484 FLU IMMUNIZE NO ADMIN: HCPCS | Performed by: ADVANCED PRACTICE MIDWIFE

## 2018-10-15 PROCEDURE — 87086 URINE CULTURE/COLONY COUNT: CPT

## 2018-10-15 PROCEDURE — G8417 CALC BMI ABV UP PARAM F/U: HCPCS | Performed by: ADVANCED PRACTICE MIDWIFE

## 2018-10-15 PROCEDURE — 76817 TRANSVAGINAL US OBSTETRIC: CPT | Performed by: OBSTETRICS & GYNECOLOGY

## 2018-10-15 PROCEDURE — 99213 OFFICE O/P EST LOW 20 MIN: CPT | Performed by: ADVANCED PRACTICE MIDWIFE

## 2018-10-15 PROCEDURE — 4004F PT TOBACCO SCREEN RCVD TLK: CPT | Performed by: ADVANCED PRACTICE MIDWIFE

## 2018-10-15 PROCEDURE — 86403 PARTICLE AGGLUT ANTBDY SCRN: CPT

## 2018-10-15 PROCEDURE — G8427 DOCREV CUR MEDS BY ELIG CLIN: HCPCS | Performed by: ADVANCED PRACTICE MIDWIFE

## 2018-10-16 LAB
CULTURE: ABNORMAL
Lab: ABNORMAL
SPECIMEN DESCRIPTION: ABNORMAL
STATUS: ABNORMAL

## 2018-11-05 ENCOUNTER — ROUTINE PRENATAL (OUTPATIENT)
Dept: OBGYN | Age: 18
End: 2018-11-05
Payer: COMMERCIAL

## 2018-11-05 ENCOUNTER — HOSPITAL ENCOUNTER (OUTPATIENT)
Age: 18
Setting detail: SPECIMEN
Discharge: HOME | End: 2018-11-05
Payer: COMMERCIAL

## 2018-11-05 VITALS — BODY MASS INDEX: 34.01 KG/M2 | DIASTOLIC BLOOD PRESSURE: 68 MMHG | WEIGHT: 180 LBS | SYSTOLIC BLOOD PRESSURE: 110 MMHG

## 2018-11-05 DIAGNOSIS — Z3A.27 27 WEEKS GESTATION OF PREGNANCY: ICD-10-CM

## 2018-11-05 DIAGNOSIS — Z34.02 ENCOUNTER FOR SUPERVISION OF NORMAL FIRST PREGNANCY IN SECOND TRIMESTER: Primary | ICD-10-CM

## 2018-11-05 DIAGNOSIS — O99.012 ANEMIA AFFECTING PREGNANCY IN SECOND TRIMESTER: Primary | ICD-10-CM

## 2018-11-05 DIAGNOSIS — O99.810 ABNORMAL GLUCOSE AFFECTING PREGNANCY: ICD-10-CM

## 2018-11-05 LAB
GLUCOSE BLD-MCNC: 142 MG/DL
HGB, POC: 10.5

## 2018-11-05 PROCEDURE — G8427 DOCREV CUR MEDS BY ELIG CLIN: HCPCS | Performed by: ADVANCED PRACTICE MIDWIFE

## 2018-11-05 PROCEDURE — 99213 OFFICE O/P EST LOW 20 MIN: CPT | Performed by: ADVANCED PRACTICE MIDWIFE

## 2018-11-05 PROCEDURE — G8484 FLU IMMUNIZE NO ADMIN: HCPCS | Performed by: ADVANCED PRACTICE MIDWIFE

## 2018-11-05 PROCEDURE — 4004F PT TOBACCO SCREEN RCVD TLK: CPT | Performed by: ADVANCED PRACTICE MIDWIFE

## 2018-11-05 PROCEDURE — G8417 CALC BMI ABV UP PARAM F/U: HCPCS | Performed by: ADVANCED PRACTICE MIDWIFE

## 2018-11-05 RX ORDER — FERROUS SULFATE 325(65) MG
325 TABLET ORAL DAILY
Qty: 30 TABLET | Refills: 3 | Status: SHIPPED | OUTPATIENT
Start: 2018-11-05 | End: 2019-03-11 | Stop reason: ALTCHOICE

## 2018-11-05 NOTE — LETTER
[address] [place] [address]  Phone: [phone]  Fax: AHSAN Bo CNM        November 5, 2018    Max Oliveira   [address]  [address] [address]      To whom it may concern: It is under my  Medical opinion that Ru Kang can be released to work, with no restrictions. If you have any questions or concerns, please don't hesitate to call.     Sincerely,        AHSAN Lloyd CNM

## 2018-11-07 ENCOUNTER — HOSPITAL ENCOUNTER (OUTPATIENT)
Dept: LAB | Age: 18
Discharge: HOME | End: 2018-11-07
Payer: COMMERCIAL

## 2018-11-07 DIAGNOSIS — O99.810 ABNORMAL GLUCOSE AFFECTING PREGNANCY: ICD-10-CM

## 2018-11-07 LAB
3 HR GLUCOSE: 65 MG/DL (ref 65–139)
AMOUNT GLUCOSE GIVEN: 100 G
GLUCOSE FASTING: 103 MG/DL (ref 65–94)
GLUCOSE TOLERANCE TEST 1 HOUR: 161 MG/DL (ref 65–179)
GLUCOSE TOLERANCE TEST 2 HOUR: 144 MG/DL (ref 65–154)

## 2018-11-07 PROCEDURE — 82951 GLUCOSE TOLERANCE TEST (GTT): CPT

## 2018-11-07 PROCEDURE — 82952 GTT-ADDED SAMPLES: CPT

## 2018-11-07 PROCEDURE — 36415 COLL VENOUS BLD VENIPUNCTURE: CPT

## 2018-11-13 ENCOUNTER — APPOINTMENT (OUTPATIENT)
Dept: ULTRASOUND IMAGING | Age: 18
End: 2018-11-13
Payer: COMMERCIAL

## 2018-11-13 ENCOUNTER — HOSPITAL ENCOUNTER (OUTPATIENT)
Age: 18
Discharge: HOME | End: 2018-11-13
Attending: ADVANCED PRACTICE MIDWIFE | Admitting: ADVANCED PRACTICE MIDWIFE
Payer: COMMERCIAL

## 2018-11-13 VITALS
HEIGHT: 61 IN | BODY MASS INDEX: 33.99 KG/M2 | HEART RATE: 110 BPM | WEIGHT: 180 LBS | SYSTOLIC BLOOD PRESSURE: 109 MMHG | DIASTOLIC BLOOD PRESSURE: 59 MMHG | RESPIRATION RATE: 18 BRPM

## 2018-11-13 LAB
-: ABNORMAL
AMORPHOUS: ABNORMAL
BACTERIA: ABNORMAL
BILIRUBIN URINE: NEGATIVE
CASTS UA: ABNORMAL /LPF
COLOR: YELLOW
COMMENT UA: ABNORMAL
CRYSTALS, UA: ABNORMAL /HPF
EPITHELIAL CELLS UA: ABNORMAL /HPF (ref 0–25)
GLUCOSE URINE: NEGATIVE
KETONES, URINE: NEGATIVE
LEUKOCYTE ESTERASE, URINE: ABNORMAL
MUCUS: ABNORMAL
NITRITE, URINE: NEGATIVE
OTHER OBSERVATIONS UA: ABNORMAL
PH UA: 7 (ref 5–9)
PROTEIN UA: NEGATIVE
RBC UA: ABNORMAL /HPF (ref 0–2)
RENAL EPITHELIAL, UA: ABNORMAL /HPF
SPECIFIC GRAVITY UA: <1.005 (ref 1.01–1.02)
TRICHOMONAS: ABNORMAL
TURBIDITY: CLEAR
URINE HGB: NEGATIVE
UROBILINOGEN, URINE: NORMAL
WBC UA: ABNORMAL /HPF (ref 0–5)
YEAST: ABNORMAL

## 2018-11-13 PROCEDURE — 76805 OB US >/= 14 WKS SNGL FETUS: CPT

## 2018-11-13 PROCEDURE — 87086 URINE CULTURE/COLONY COUNT: CPT

## 2018-11-13 PROCEDURE — 99215 OFFICE O/P EST HI 40 MIN: CPT

## 2018-11-13 PROCEDURE — 81001 URINALYSIS AUTO W/SCOPE: CPT

## 2018-11-13 NOTE — FLOWSHEET NOTE
Pt sitting up eating lunch. Denies any other needs at this time. State abd pain is \"a little better\".

## 2018-11-14 LAB
CULTURE: NORMAL
Lab: NORMAL
SPECIMEN DESCRIPTION: NORMAL
STATUS: NORMAL

## 2018-11-26 ENCOUNTER — ROUTINE PRENATAL (OUTPATIENT)
Dept: OBGYN | Age: 18
End: 2018-11-26
Payer: COMMERCIAL

## 2018-11-26 VITALS — DIASTOLIC BLOOD PRESSURE: 78 MMHG | WEIGHT: 181 LBS | SYSTOLIC BLOOD PRESSURE: 114 MMHG | BODY MASS INDEX: 34.2 KG/M2

## 2018-11-26 DIAGNOSIS — O43.113 CIRCUMVALLATE PLACENTA IN THIRD TRIMESTER: ICD-10-CM

## 2018-11-26 DIAGNOSIS — Z23 NEED FOR IMMUNIZATION AGAINST PERTUSSIS: ICD-10-CM

## 2018-11-26 DIAGNOSIS — B37.0 THRUSH: ICD-10-CM

## 2018-11-26 DIAGNOSIS — Z3A.30 30 WEEKS GESTATION OF PREGNANCY: ICD-10-CM

## 2018-11-26 DIAGNOSIS — O26.843 UTERINE SIZE-DATE DISCREPANCY IN THIRD TRIMESTER: Primary | ICD-10-CM

## 2018-11-26 LAB
ABDOMINAL CIRCUMFERENCE: 25.1 CM
BIPARIETAL DIAMETER: 7.57 CM
ESTIMATED FETAL WEIGHT: 1375 GRAMS
FEMORAL DIAMETER: NORMAL CM
HC/AC: 1.07
HEAD CIRCUMFERENCE: 26.98 CM

## 2018-11-26 PROCEDURE — 4004F PT TOBACCO SCREEN RCVD TLK: CPT | Performed by: ADVANCED PRACTICE MIDWIFE

## 2018-11-26 PROCEDURE — G8484 FLU IMMUNIZE NO ADMIN: HCPCS | Performed by: ADVANCED PRACTICE MIDWIFE

## 2018-11-26 PROCEDURE — 90715 TDAP VACCINE 7 YRS/> IM: CPT | Performed by: ADVANCED PRACTICE MIDWIFE

## 2018-11-26 PROCEDURE — 90471 IMMUNIZATION ADMIN: CPT | Performed by: ADVANCED PRACTICE MIDWIFE

## 2018-11-26 PROCEDURE — 76816 OB US FOLLOW-UP PER FETUS: CPT | Performed by: OBSTETRICS & GYNECOLOGY

## 2018-11-26 PROCEDURE — G8427 DOCREV CUR MEDS BY ELIG CLIN: HCPCS | Performed by: ADVANCED PRACTICE MIDWIFE

## 2018-11-26 PROCEDURE — G8417 CALC BMI ABV UP PARAM F/U: HCPCS | Performed by: ADVANCED PRACTICE MIDWIFE

## 2018-11-26 PROCEDURE — 99213 OFFICE O/P EST LOW 20 MIN: CPT | Performed by: ADVANCED PRACTICE MIDWIFE

## 2018-11-26 RX ORDER — NYSTATIN 100000 U/G
OINTMENT TOPICAL
Qty: 1 TUBE | Refills: 1 | Status: ON HOLD | OUTPATIENT
Start: 2018-11-26 | End: 2019-01-30 | Stop reason: HOSPADM

## 2018-11-28 ENCOUNTER — HOSPITAL ENCOUNTER (EMERGENCY)
Age: 18
Discharge: HOME | End: 2018-11-28
Payer: COMMERCIAL

## 2018-11-28 ENCOUNTER — TELEPHONE (OUTPATIENT)
Dept: OBGYN | Age: 18
End: 2018-11-28

## 2018-11-28 ENCOUNTER — HOSPITAL ENCOUNTER (OUTPATIENT)
Age: 18
Discharge: HOME | End: 2018-11-28
Attending: ADVANCED PRACTICE MIDWIFE | Admitting: ADVANCED PRACTICE MIDWIFE
Payer: COMMERCIAL

## 2018-11-28 VITALS
SYSTOLIC BLOOD PRESSURE: 104 MMHG | HEIGHT: 61 IN | DIASTOLIC BLOOD PRESSURE: 59 MMHG | RESPIRATION RATE: 18 BRPM | BODY MASS INDEX: 34.17 KG/M2 | WEIGHT: 181 LBS | TEMPERATURE: 98 F | HEART RATE: 78 BPM

## 2018-11-28 VITALS
DIASTOLIC BLOOD PRESSURE: 63 MMHG | HEART RATE: 82 BPM | RESPIRATION RATE: 16 BRPM | SYSTOLIC BLOOD PRESSURE: 106 MMHG | OXYGEN SATURATION: 100 % | TEMPERATURE: 98.1 F

## 2018-11-28 DIAGNOSIS — K62.5 RECTAL BLEEDING: Primary | ICD-10-CM

## 2018-11-28 DIAGNOSIS — N39.0 ACUTE URINARY TRACT INFECTION: ICD-10-CM

## 2018-11-28 LAB
-: ABNORMAL
ALBUMIN SERPL-MCNC: 3.5 G/DL (ref 3.5–5.2)
ALBUMIN/GLOBULIN RATIO: 1.3 (ref 1–2.5)
ALP BLD-CCNC: 128 U/L (ref 35–104)
ALT SERPL-CCNC: 6 U/L (ref 5–33)
AMORPHOUS: ABNORMAL
ANION GAP SERPL CALCULATED.3IONS-SCNC: 12 MMOL/L (ref 9–17)
AST SERPL-CCNC: 10 U/L
BACTERIA: ABNORMAL
BILIRUB SERPL-MCNC: 0.37 MG/DL (ref 0.3–1.2)
BILIRUBIN URINE: NEGATIVE
BUN BLDV-MCNC: 4 MG/DL (ref 6–20)
BUN/CREAT BLD: 9 (ref 9–20)
CALCIUM SERPL-MCNC: 8.9 MG/DL (ref 8.6–10.4)
CASTS UA: ABNORMAL /LPF
CHLORIDE BLD-SCNC: 101 MMOL/L (ref 98–107)
CO2: 22 MMOL/L (ref 20–31)
COLOR: YELLOW
COMMENT UA: ABNORMAL
CREAT SERPL-MCNC: 0.43 MG/DL (ref 0.5–0.9)
CRYSTALS, UA: ABNORMAL /HPF
EPITHELIAL CELLS UA: ABNORMAL /HPF (ref 0–25)
GFR AFRICAN AMERICAN: >60 ML/MIN
GFR NON-AFRICAN AMERICAN: >60 ML/MIN
GFR SERPL CREATININE-BSD FRML MDRD: ABNORMAL ML/MIN/{1.73_M2}
GFR SERPL CREATININE-BSD FRML MDRD: ABNORMAL ML/MIN/{1.73_M2}
GLUCOSE BLD-MCNC: 90 MG/DL (ref 70–99)
GLUCOSE URINE: NEGATIVE
HCT VFR BLD CALC: 36.3 % (ref 36.3–47.1)
HEMOGLOBIN: 11.8 G/DL (ref 11.9–15.1)
KETONES, URINE: ABNORMAL
LACTIC ACID, WHOLE BLOOD: NORMAL MMOL/L (ref 0.7–2.1)
LACTIC ACID: 0.5 MMOL/L (ref 0.5–2.2)
LEUKOCYTE ESTERASE, URINE: ABNORMAL
MCH RBC QN AUTO: 29.6 PG (ref 25.2–33.5)
MCHC RBC AUTO-ENTMCNC: 32.5 G/DL (ref 28.4–34.8)
MCV RBC AUTO: 91.2 FL (ref 82.6–102.9)
MUCUS: ABNORMAL
NITRITE, URINE: NEGATIVE
NRBC AUTOMATED: 0 PER 100 WBC
OTHER OBSERVATIONS UA: ABNORMAL
PDW BLD-RTO: 14.8 % (ref 11.8–14.4)
PH UA: 7 (ref 5–9)
PLATELET # BLD: 363 K/UL (ref 138–453)
PMV BLD AUTO: 10.3 FL (ref 8.1–13.5)
POTASSIUM SERPL-SCNC: 3.6 MMOL/L (ref 3.7–5.3)
PROTEIN UA: NEGATIVE
RBC # BLD: 3.98 M/UL (ref 3.95–5.11)
RBC UA: ABNORMAL /HPF (ref 0–2)
RENAL EPITHELIAL, UA: ABNORMAL /HPF
SODIUM BLD-SCNC: 135 MMOL/L (ref 135–144)
SPECIFIC GRAVITY UA: 1.02 (ref 1.01–1.02)
TOTAL PROTEIN: 6.3 G/DL (ref 6.4–8.3)
TRICHOMONAS: ABNORMAL
TURBIDITY: CLEAR
URINE HGB: NEGATIVE
UROBILINOGEN, URINE: NORMAL
WBC # BLD: 15.1 K/UL (ref 4.5–13.5)
WBC UA: ABNORMAL /HPF (ref 0–5)
YEAST: ABNORMAL

## 2018-11-28 PROCEDURE — 59025 FETAL NON-STRESS TEST: CPT

## 2018-11-28 PROCEDURE — 99283 EMERGENCY DEPT VISIT LOW MDM: CPT

## 2018-11-28 PROCEDURE — 87086 URINE CULTURE/COLONY COUNT: CPT

## 2018-11-28 PROCEDURE — 36415 COLL VENOUS BLD VENIPUNCTURE: CPT

## 2018-11-28 PROCEDURE — 81001 URINALYSIS AUTO W/SCOPE: CPT

## 2018-11-28 PROCEDURE — 80053 COMPREHEN METABOLIC PANEL: CPT

## 2018-11-28 PROCEDURE — 85027 COMPLETE CBC AUTOMATED: CPT

## 2018-11-28 PROCEDURE — 83605 ASSAY OF LACTIC ACID: CPT

## 2018-11-28 RX ORDER — CEPHALEXIN 500 MG/1
500 CAPSULE ORAL 2 TIMES DAILY
Qty: 14 CAPSULE | Refills: 0 | Status: SHIPPED | OUTPATIENT
Start: 2018-11-28 | End: 2018-12-05

## 2018-11-28 ASSESSMENT — ENCOUNTER SYMPTOMS
VOMITING: 0
NAUSEA: 0
BLOOD IN STOOL: 0
CONSTIPATION: 0
SORE THROAT: 0
SHORTNESS OF BREATH: 0
EYE DISCHARGE: 0
COUGH: 0
ABDOMINAL PAIN: 1
WHEEZING: 0
CHEST TIGHTNESS: 0
DIARRHEA: 0
EYE REDNESS: 0
RHINORRHEA: 0
BACK PAIN: 0
ANAL BLEEDING: 1

## 2018-11-28 ASSESSMENT — PAIN DESCRIPTION - PAIN TYPE: TYPE: ACUTE PAIN

## 2018-11-28 ASSESSMENT — PAIN SCALES - GENERAL
PAINLEVEL_OUTOF10: 0
PAINLEVEL_OUTOF10: 5

## 2018-11-28 ASSESSMENT — PAIN DESCRIPTION - FREQUENCY: FREQUENCY: INTERMITTENT

## 2018-11-28 ASSESSMENT — PAIN DESCRIPTION - LOCATION: LOCATION: RECTUM

## 2018-11-28 ASSESSMENT — PAIN DESCRIPTION - DESCRIPTORS: DESCRIPTORS: ACHING

## 2018-11-28 NOTE — ED PROVIDER NOTES
Allergic/Immunologic: Negative for food allergies and immunocompromised state. Neurological: Negative for dizziness, syncope, weakness and light-headedness. Hematological: Negative for adenopathy. Does not bruise/bleed easily. Psychiatric/Behavioral: Negative for behavioral problems and suicidal ideas. The patient is not nervous/anxious. Except as noted above the remainder of the review of systems was reviewed and negative. PAST MEDICAL HISTORY     Past Medical History:   Diagnosis Date    Asthma     Colitis     Colitis     Depression     Dysmenorrhea in the adolescent     Facial abscess     was cyst left cheek    Rectal bleed     Vertigo          SURGICALHISTORY       Past Surgical History:   Procedure Laterality Date    ABSCESS DRAINAGE Left 2011    once left cheek    COLONOSCOPY      COLONOSCOPY  12/05/2013        UPPER GASTROINTESTINAL ENDOSCOPY  12/05/2013             CURRENT MEDICATIONS       Previous Medications    FERROUS SULFATE (GREGORY-VINH) 325 (65 FE) MG TABLET    Take 1 tablet by mouth daily    NYSTATIN (MYCOSTATIN) 642583 UNIT/GM OINTMENT    Apply topically 2 times daily.     ONDANSETRON (ZOFRAN) 4 MG TABLET    Take 1 tablet by mouth daily as needed for Nausea or Vomiting    PRENATAL MV-MIN-FE FUM-FA-DHA (PRENATAL 1 PO)    Take by mouth       ALLERGIES     Silver and Soap    FAMILY HISTORY       Family History   Problem Relation Age of Onset    Allergies Mother     Crohn's Disease Mother     High Cholesterol Father     Migraines Father     Stroke Father     Heart Disease Father     Cancer Maternal Grandmother     Cancer Maternal Grandfather     Migraines Brother           SOCIAL HISTORY       Social History     Social History    Marital status: Single     Spouse name: N/A    Number of children: N/A    Years of education: N/A     Social History Main Topics    Smoking status: Current Every Day Smoker     Packs/day: 0.50     Years: 2.00 [address]  [phone]    If symptoms worsen, As needed    AHSAN Balbuena CNM  [address] [address] [phone]    Schedule an appointment as soon as possible for a visit in 1 day        DISCHARGE MEDICATIONS:  New Prescriptions    CEPHALEXIN (KEFLEX) 500 MG CAPSULE    Take 1 capsule by mouth 2 times daily for 7 days              Summation      Patient Course:      ED Medications administered this visit:  Medications - No data to display    New Prescriptions from this visit:    New Prescriptions    CEPHALEXIN (KEFLEX) 500 MG CAPSULE    Take 1 capsule by mouth 2 times daily for 7 days       Follow-up:  [facility] ED  [address]  [phone]    If symptoms worsen, As needed    AHSAN Balbuena CNM  [address] [address] [phone]    Schedule an appointment as soon as possible for a visit in 1 day          Final Impression:   1. Rectal bleeding    2.  Acute urinary tract infection               (Please note that portions of this note were completed with a voice recognition program.  Efforts were made to edit the dictations but occasionally words are mis-transcribed.)            Yvan Noriega PA-C  11/28/18 1300

## 2018-11-29 LAB
CULTURE: NORMAL
Lab: NORMAL
SPECIMEN DESCRIPTION: NORMAL
STATUS: NORMAL

## 2018-12-10 ENCOUNTER — ROUTINE PRENATAL (OUTPATIENT)
Dept: OBGYN | Age: 18
End: 2018-12-10
Payer: COMMERCIAL

## 2018-12-10 VITALS — DIASTOLIC BLOOD PRESSURE: 76 MMHG | BODY MASS INDEX: 34.96 KG/M2 | WEIGHT: 185 LBS | SYSTOLIC BLOOD PRESSURE: 114 MMHG

## 2018-12-10 DIAGNOSIS — Z3A.32 32 WEEKS GESTATION OF PREGNANCY: ICD-10-CM

## 2018-12-10 DIAGNOSIS — O43.113 CIRCUMVALLATE PLACENTA IN THIRD TRIMESTER: Primary | ICD-10-CM

## 2018-12-10 DIAGNOSIS — Z34.03 ENCOUNTER FOR SUPERVISION OF NORMAL FIRST PREGNANCY IN THIRD TRIMESTER: ICD-10-CM

## 2018-12-10 PROBLEM — O43.112 CIRCUMVALLATE PLACENTA IN SECOND TRIMESTER: Status: ACTIVE | Noted: 2018-11-26

## 2018-12-10 PROBLEM — O99.332 SMOKING (TOBACCO) COMPLICATING PREGNANCY, SECOND TRIMESTER: Status: ACTIVE | Noted: 2018-10-24

## 2018-12-10 PROBLEM — Z86.59 H/O: DEPRESSION: Status: ACTIVE | Noted: 2018-10-24

## 2018-12-10 PROCEDURE — 99213 OFFICE O/P EST LOW 20 MIN: CPT | Performed by: ADVANCED PRACTICE MIDWIFE

## 2018-12-10 PROCEDURE — G8484 FLU IMMUNIZE NO ADMIN: HCPCS | Performed by: ADVANCED PRACTICE MIDWIFE

## 2018-12-10 PROCEDURE — G8427 DOCREV CUR MEDS BY ELIG CLIN: HCPCS | Performed by: ADVANCED PRACTICE MIDWIFE

## 2018-12-10 PROCEDURE — 76819 FETAL BIOPHYS PROFIL W/O NST: CPT | Performed by: OBSTETRICS & GYNECOLOGY

## 2018-12-10 PROCEDURE — G8417 CALC BMI ABV UP PARAM F/U: HCPCS | Performed by: ADVANCED PRACTICE MIDWIFE

## 2018-12-10 PROCEDURE — 4004F PT TOBACCO SCREEN RCVD TLK: CPT | Performed by: ADVANCED PRACTICE MIDWIFE

## 2018-12-10 NOTE — PROGRESS NOTES
Magnus Celis is here at [address] [address] for:    Chief Complaint   Patient presents with    Routine Prenatal Visit     in house u/s for BPP, pt states no issues or concerns        Estimated Due Date: Estimated Date of Delivery: 19    OB History    Para Term  AB Living   1             SAB TAB Ectopic Molar Multiple Live Births                    # Outcome Date GA Lbr Dionisio/2nd Weight Sex Delivery Anes PTL Lv   1 Current                    Past Medical History:   Diagnosis Date    Asthma     pt has albuterol inhaler    Colitis     Colitis     Depression     , on meds    Dysmenorrhea in the adolescent     Facial abscess     was cyst left cheek    Rectal bleed     Vertigo        Past Surgical History:   Procedure Laterality Date    ABSCESS DRAINAGE Left     once left cheek    COLONOSCOPY      COLONOSCOPY  2013        UPPER GASTROINTESTINAL ENDOSCOPY  2013           History   Smoking Status    Current Every Day Smoker    Packs/day: 0.50    Years: 2.00    Types: Cigarettes   Smokeless Tobacco    Never Used     Comment: trying to quit on own        History   Alcohol Use No     Comment: occasionally with family, hardly ever       No results found for this visit on 12/10/18. Vitals:  /76   Wt 185 lb (83.9 kg)   LMP 05/10/2018   BMI 34.96 kg/m²   Estimated body mass index is 34.96 kg/m² as calculated from the following:    Height as of 18: 5' 1\" (1.549 m). Weight as of this encounter: 185 lb (83.9 kg). Labs:    No results found for this visit on 12/10/18. HPI: here for routine ob visit, and fetal surveillance     PT denies fever, chills, nausea and vomiting       Abdomen: enlarged, gravid, soft, nontender     See prenatal vital sign section and fetal assessment section    ASSESSMENT & Plan    Diagnosis Orders   1.  Circumvallate placenta in third trimester  US Fetal Biophysical Profile WO Non Stress Testing   2. 32 weeks gestation of pregnancy  US Fetal Biophysical Profile WO Non Stress Testing   3. Encounter for supervision of normal first pregnancy in third trimester               I am having Ms. Laws maintain her Prenatal MV-Min-Fe Fum-FA-DHA (PRENATAL 1 PO), ondansetron, ferrous sulfate, nystatin, and ALBUTEROL SULFATE HFA IN. Return in about 1 week (around 12/17/2018) for keep appt with bpp and needs rhogam next week also, ob. There are no Patient Instructions on file for this visit.           Lakshmi Keyes,12/10/2018 9:31 AM

## 2018-12-17 ENCOUNTER — PROCEDURE VISIT (OUTPATIENT)
Dept: OBGYN | Age: 18
End: 2018-12-17
Payer: COMMERCIAL

## 2018-12-17 DIAGNOSIS — Z3A.33 33 WEEKS GESTATION OF PREGNANCY: ICD-10-CM

## 2018-12-17 DIAGNOSIS — O43.113 CIRCUMVALLATE PLACENTA IN THIRD TRIMESTER: Primary | ICD-10-CM

## 2018-12-17 PROCEDURE — 76819 FETAL BIOPHYS PROFIL W/O NST: CPT | Performed by: OBSTETRICS & GYNECOLOGY

## 2018-12-25 ENCOUNTER — TELEPHONE (OUTPATIENT)
Dept: OBGYN | Age: 18
End: 2018-12-25

## 2018-12-25 ENCOUNTER — HOSPITAL ENCOUNTER (OUTPATIENT)
Age: 18
Discharge: HOME | End: 2018-12-25
Attending: OBSTETRICS & GYNECOLOGY | Admitting: OBSTETRICS & GYNECOLOGY
Payer: COMMERCIAL

## 2018-12-25 VITALS — DIASTOLIC BLOOD PRESSURE: 60 MMHG | HEART RATE: 88 BPM | SYSTOLIC BLOOD PRESSURE: 100 MMHG | TEMPERATURE: 97.8 F

## 2018-12-25 LAB
-: ABNORMAL
AMORPHOUS: ABNORMAL
BACTERIA: ABNORMAL
BILIRUBIN URINE: NEGATIVE
CASTS UA: ABNORMAL /LPF
COLOR: YELLOW
COMMENT UA: ABNORMAL
CRYSTALS, UA: ABNORMAL /HPF
EPITHELIAL CELLS UA: ABNORMAL /HPF (ref 0–25)
GLUCOSE URINE: NEGATIVE
KETONES, URINE: NEGATIVE
LEUKOCYTE ESTERASE, URINE: ABNORMAL
MUCUS: ABNORMAL
NITRITE, URINE: NEGATIVE
OTHER OBSERVATIONS UA: ABNORMAL
PH UA: 7 (ref 5–9)
PROTEIN UA: NEGATIVE
RBC UA: ABNORMAL /HPF (ref 0–2)
RENAL EPITHELIAL, UA: ABNORMAL /HPF
SPECIFIC GRAVITY UA: 1.02 (ref 1.01–1.02)
TRICHOMONAS: ABNORMAL
TURBIDITY: CLEAR
URINE HGB: NEGATIVE
UROBILINOGEN, URINE: NORMAL
WBC UA: ABNORMAL /HPF (ref 0–5)
YEAST: ABNORMAL

## 2018-12-25 PROCEDURE — 81001 URINALYSIS AUTO W/SCOPE: CPT

## 2018-12-27 ENCOUNTER — HOSPITAL ENCOUNTER (OUTPATIENT)
Age: 18
Discharge: HOME | End: 2018-12-27
Payer: COMMERCIAL

## 2018-12-27 ENCOUNTER — ROUTINE PRENATAL (OUTPATIENT)
Dept: OBGYN | Age: 18
End: 2018-12-27
Payer: COMMERCIAL

## 2018-12-27 VITALS — DIASTOLIC BLOOD PRESSURE: 62 MMHG | SYSTOLIC BLOOD PRESSURE: 102 MMHG | WEIGHT: 187.2 LBS | BODY MASS INDEX: 35.37 KG/M2

## 2018-12-27 DIAGNOSIS — Z67.91 RH NEGATIVE STATE IN ANTEPARTUM PERIOD, THIRD TRIMESTER: ICD-10-CM

## 2018-12-27 DIAGNOSIS — O26.893 RH NEGATIVE STATE IN ANTEPARTUM PERIOD, THIRD TRIMESTER: ICD-10-CM

## 2018-12-27 DIAGNOSIS — Z3A.34 34 WEEKS GESTATION OF PREGNANCY: ICD-10-CM

## 2018-12-27 DIAGNOSIS — Z34.03 ENCOUNTER FOR SUPERVISION OF NORMAL FIRST PREGNANCY IN THIRD TRIMESTER: ICD-10-CM

## 2018-12-27 DIAGNOSIS — O43.113 CIRCUMVALLATE PLACENTA IN THIRD TRIMESTER: Primary | ICD-10-CM

## 2018-12-27 PROCEDURE — 4004F PT TOBACCO SCREEN RCVD TLK: CPT | Performed by: ADVANCED PRACTICE MIDWIFE

## 2018-12-27 PROCEDURE — 86850 RBC ANTIBODY SCREEN: CPT

## 2018-12-27 PROCEDURE — G8484 FLU IMMUNIZE NO ADMIN: HCPCS | Performed by: ADVANCED PRACTICE MIDWIFE

## 2018-12-27 PROCEDURE — 76819 FETAL BIOPHYS PROFIL W/O NST: CPT | Performed by: OBSTETRICS & GYNECOLOGY

## 2018-12-27 PROCEDURE — G8427 DOCREV CUR MEDS BY ELIG CLIN: HCPCS | Performed by: ADVANCED PRACTICE MIDWIFE

## 2018-12-27 PROCEDURE — 36415 COLL VENOUS BLD VENIPUNCTURE: CPT

## 2018-12-27 PROCEDURE — G8417 CALC BMI ABV UP PARAM F/U: HCPCS | Performed by: ADVANCED PRACTICE MIDWIFE

## 2018-12-27 PROCEDURE — 86901 BLOOD TYPING SEROLOGIC RH(D): CPT

## 2018-12-27 PROCEDURE — 99213 OFFICE O/P EST LOW 20 MIN: CPT | Performed by: ADVANCED PRACTICE MIDWIFE

## 2019-01-02 ENCOUNTER — HOSPITAL ENCOUNTER (OUTPATIENT)
Dept: INFUSION THERAPY | Age: 19
Discharge: HOME | End: 2019-01-02
Payer: COMMERCIAL

## 2019-01-02 ENCOUNTER — PROCEDURE VISIT (OUTPATIENT)
Dept: OBGYN | Age: 19
End: 2019-01-02
Payer: COMMERCIAL

## 2019-01-02 VITALS
DIASTOLIC BLOOD PRESSURE: 56 MMHG | HEART RATE: 99 BPM | RESPIRATION RATE: 20 BRPM | TEMPERATURE: 97.1 F | SYSTOLIC BLOOD PRESSURE: 133 MMHG

## 2019-01-02 DIAGNOSIS — O43.113 CIRCUMVALLATE PLACENTA IN THIRD TRIMESTER: Primary | ICD-10-CM

## 2019-01-02 DIAGNOSIS — Z3A.35 35 WEEKS GESTATION OF PREGNANCY: ICD-10-CM

## 2019-01-02 PROCEDURE — 76819 FETAL BIOPHYS PROFIL W/O NST: CPT | Performed by: OBSTETRICS & GYNECOLOGY

## 2019-01-02 PROCEDURE — 96372 THER/PROPH/DIAG INJ SC/IM: CPT

## 2019-01-02 PROCEDURE — 6360000002 HC RX W HCPCS: Performed by: ADVANCED PRACTICE MIDWIFE

## 2019-01-02 RX ADMIN — HUMAN RHO(D) IMMUNE GLOBULIN 300 MCG: 300 INJECTION, SOLUTION INTRAMUSCULAR at 09:52

## 2019-01-02 NOTE — PLAN OF CARE
Problem: KNOWLEDGE DEFICIT  Goal: Patient/S.O. demonstrates understanding of disease process, treatment plan, medications, and discharge instructions.   Outcome: Completed Date Met: 01/02/19

## 2019-01-07 ENCOUNTER — HOSPITAL ENCOUNTER (OUTPATIENT)
Age: 19
Setting detail: SPECIMEN
Discharge: HOME | End: 2019-01-07
Payer: COMMERCIAL

## 2019-01-07 ENCOUNTER — ROUTINE PRENATAL (OUTPATIENT)
Dept: OBGYN | Age: 19
End: 2019-01-07
Payer: COMMERCIAL

## 2019-01-07 VITALS — WEIGHT: 188 LBS | DIASTOLIC BLOOD PRESSURE: 70 MMHG | SYSTOLIC BLOOD PRESSURE: 108 MMHG | BODY MASS INDEX: 35.52 KG/M2

## 2019-01-07 DIAGNOSIS — Z34.03 ENCOUNTER FOR SUPERVISION OF NORMAL FIRST PREGNANCY IN THIRD TRIMESTER: ICD-10-CM

## 2019-01-07 DIAGNOSIS — O43.113 CIRCUMVALLATE PLACENTA IN THIRD TRIMESTER: Primary | ICD-10-CM

## 2019-01-07 DIAGNOSIS — J02.9 PHARYNGITIS, UNSPECIFIED ETIOLOGY: ICD-10-CM

## 2019-01-07 DIAGNOSIS — Z3A.36 36 WEEKS GESTATION OF PREGNANCY: ICD-10-CM

## 2019-01-07 PROCEDURE — G8484 FLU IMMUNIZE NO ADMIN: HCPCS | Performed by: ADVANCED PRACTICE MIDWIFE

## 2019-01-07 PROCEDURE — 99213 OFFICE O/P EST LOW 20 MIN: CPT | Performed by: ADVANCED PRACTICE MIDWIFE

## 2019-01-07 PROCEDURE — G8417 CALC BMI ABV UP PARAM F/U: HCPCS | Performed by: ADVANCED PRACTICE MIDWIFE

## 2019-01-07 PROCEDURE — G8427 DOCREV CUR MEDS BY ELIG CLIN: HCPCS | Performed by: ADVANCED PRACTICE MIDWIFE

## 2019-01-07 PROCEDURE — 4004F PT TOBACCO SCREEN RCVD TLK: CPT | Performed by: ADVANCED PRACTICE MIDWIFE

## 2019-01-07 PROCEDURE — 87081 CULTURE SCREEN ONLY: CPT

## 2019-01-07 PROCEDURE — 76819 FETAL BIOPHYS PROFIL W/O NST: CPT | Performed by: OBSTETRICS & GYNECOLOGY

## 2019-01-07 RX ORDER — AMOXICILLIN 500 MG/1
500 CAPSULE ORAL 3 TIMES DAILY
Qty: 21 CAPSULE | Refills: 0 | Status: SHIPPED | OUTPATIENT
Start: 2019-01-07 | End: 2019-01-14

## 2019-01-10 LAB
CULTURE: NORMAL
CULTURE: NORMAL
Lab: NORMAL
SPECIMEN DESCRIPTION: NORMAL
STATUS: NORMAL

## 2019-01-14 ENCOUNTER — ROUTINE PRENATAL (OUTPATIENT)
Dept: OBGYN | Age: 19
End: 2019-01-14
Payer: COMMERCIAL

## 2019-01-14 VITALS — SYSTOLIC BLOOD PRESSURE: 116 MMHG | BODY MASS INDEX: 35.9 KG/M2 | DIASTOLIC BLOOD PRESSURE: 70 MMHG | WEIGHT: 190 LBS

## 2019-01-14 DIAGNOSIS — Z3A.37 37 WEEKS GESTATION OF PREGNANCY: ICD-10-CM

## 2019-01-14 DIAGNOSIS — O43.113 CIRCUMVALLATE PLACENTA IN THIRD TRIMESTER: Primary | ICD-10-CM

## 2019-01-14 PROCEDURE — 76819 FETAL BIOPHYS PROFIL W/O NST: CPT | Performed by: OBSTETRICS & GYNECOLOGY

## 2019-01-14 PROCEDURE — 4004F PT TOBACCO SCREEN RCVD TLK: CPT | Performed by: ADVANCED PRACTICE MIDWIFE

## 2019-01-14 PROCEDURE — G8484 FLU IMMUNIZE NO ADMIN: HCPCS | Performed by: ADVANCED PRACTICE MIDWIFE

## 2019-01-14 PROCEDURE — 99213 OFFICE O/P EST LOW 20 MIN: CPT | Performed by: ADVANCED PRACTICE MIDWIFE

## 2019-01-14 PROCEDURE — G8417 CALC BMI ABV UP PARAM F/U: HCPCS | Performed by: ADVANCED PRACTICE MIDWIFE

## 2019-01-14 PROCEDURE — G8427 DOCREV CUR MEDS BY ELIG CLIN: HCPCS | Performed by: ADVANCED PRACTICE MIDWIFE

## 2019-01-17 ENCOUNTER — ROUTINE PRENATAL (OUTPATIENT)
Dept: OBGYN | Age: 19
End: 2019-01-17
Payer: COMMERCIAL

## 2019-01-17 VITALS — SYSTOLIC BLOOD PRESSURE: 124 MMHG | DIASTOLIC BLOOD PRESSURE: 80 MMHG | BODY MASS INDEX: 36.16 KG/M2 | WEIGHT: 191.4 LBS

## 2019-01-17 DIAGNOSIS — Z3A.37 37 WEEKS GESTATION OF PREGNANCY: ICD-10-CM

## 2019-01-17 DIAGNOSIS — O43.113 CIRCUMVALLATE PLACENTA IN THIRD TRIMESTER: Primary | ICD-10-CM

## 2019-01-17 PROCEDURE — G8427 DOCREV CUR MEDS BY ELIG CLIN: HCPCS | Performed by: ADVANCED PRACTICE MIDWIFE

## 2019-01-17 PROCEDURE — G8484 FLU IMMUNIZE NO ADMIN: HCPCS | Performed by: ADVANCED PRACTICE MIDWIFE

## 2019-01-17 PROCEDURE — 4004F PT TOBACCO SCREEN RCVD TLK: CPT | Performed by: ADVANCED PRACTICE MIDWIFE

## 2019-01-17 PROCEDURE — G8417 CALC BMI ABV UP PARAM F/U: HCPCS | Performed by: ADVANCED PRACTICE MIDWIFE

## 2019-01-17 PROCEDURE — 99213 OFFICE O/P EST LOW 20 MIN: CPT | Performed by: ADVANCED PRACTICE MIDWIFE

## 2019-01-17 PROCEDURE — 76819 FETAL BIOPHYS PROFIL W/O NST: CPT | Performed by: OBSTETRICS & GYNECOLOGY

## 2019-01-21 ENCOUNTER — ROUTINE PRENATAL (OUTPATIENT)
Dept: OBGYN | Age: 19
End: 2019-01-21
Payer: COMMERCIAL

## 2019-01-21 VITALS — SYSTOLIC BLOOD PRESSURE: 120 MMHG | WEIGHT: 189 LBS | BODY MASS INDEX: 35.71 KG/M2 | DIASTOLIC BLOOD PRESSURE: 74 MMHG

## 2019-01-21 DIAGNOSIS — O43.113 CIRCUMVALLATE PLACENTA IN THIRD TRIMESTER: Primary | ICD-10-CM

## 2019-01-21 PROCEDURE — G8484 FLU IMMUNIZE NO ADMIN: HCPCS | Performed by: ADVANCED PRACTICE MIDWIFE

## 2019-01-21 PROCEDURE — G8427 DOCREV CUR MEDS BY ELIG CLIN: HCPCS | Performed by: ADVANCED PRACTICE MIDWIFE

## 2019-01-21 PROCEDURE — 99213 OFFICE O/P EST LOW 20 MIN: CPT | Performed by: ADVANCED PRACTICE MIDWIFE

## 2019-01-21 PROCEDURE — 4004F PT TOBACCO SCREEN RCVD TLK: CPT | Performed by: ADVANCED PRACTICE MIDWIFE

## 2019-01-21 PROCEDURE — 76819 FETAL BIOPHYS PROFIL W/O NST: CPT | Performed by: OBSTETRICS & GYNECOLOGY

## 2019-01-21 PROCEDURE — G8417 CALC BMI ABV UP PARAM F/U: HCPCS | Performed by: ADVANCED PRACTICE MIDWIFE

## 2019-01-24 ENCOUNTER — ROUTINE PRENATAL (OUTPATIENT)
Dept: OBGYN | Age: 19
End: 2019-01-24
Payer: COMMERCIAL

## 2019-01-24 VITALS — DIASTOLIC BLOOD PRESSURE: 70 MMHG | BODY MASS INDEX: 36.47 KG/M2 | SYSTOLIC BLOOD PRESSURE: 114 MMHG | WEIGHT: 193 LBS

## 2019-01-24 DIAGNOSIS — Z3A.38 38 WEEKS GESTATION OF PREGNANCY: ICD-10-CM

## 2019-01-24 DIAGNOSIS — Z34.03 ENCOUNTER FOR SUPERVISION OF NORMAL FIRST PREGNANCY IN THIRD TRIMESTER: Primary | ICD-10-CM

## 2019-01-24 PROCEDURE — G8484 FLU IMMUNIZE NO ADMIN: HCPCS | Performed by: ADVANCED PRACTICE MIDWIFE

## 2019-01-24 PROCEDURE — G8427 DOCREV CUR MEDS BY ELIG CLIN: HCPCS | Performed by: ADVANCED PRACTICE MIDWIFE

## 2019-01-24 PROCEDURE — 4004F PT TOBACCO SCREEN RCVD TLK: CPT | Performed by: ADVANCED PRACTICE MIDWIFE

## 2019-01-24 PROCEDURE — 59025 FETAL NON-STRESS TEST: CPT | Performed by: ADVANCED PRACTICE MIDWIFE

## 2019-01-24 PROCEDURE — G8417 CALC BMI ABV UP PARAM F/U: HCPCS | Performed by: ADVANCED PRACTICE MIDWIFE

## 2019-01-24 PROCEDURE — 99213 OFFICE O/P EST LOW 20 MIN: CPT | Performed by: ADVANCED PRACTICE MIDWIFE

## 2019-01-27 RX ORDER — NALBUPHINE HCL 10 MG/ML
10 AMPUL (ML) INJECTION
Status: CANCELLED | OUTPATIENT
Start: 2019-01-27

## 2019-01-27 RX ORDER — ACETAMINOPHEN 325 MG/1
650 TABLET ORAL EVERY 4 HOURS PRN
Status: CANCELLED | OUTPATIENT
Start: 2019-01-27

## 2019-01-27 RX ORDER — MISOPROSTOL 100 UG/1
900 TABLET ORAL PRN
Status: CANCELLED | OUTPATIENT
Start: 2019-01-27

## 2019-01-27 RX ORDER — SODIUM CHLORIDE, SODIUM LACTATE, POTASSIUM CHLORIDE, CALCIUM CHLORIDE 600; 310; 30; 20 MG/100ML; MG/100ML; MG/100ML; MG/100ML
INJECTION, SOLUTION INTRAVENOUS CONTINUOUS
Status: CANCELLED | OUTPATIENT
Start: 2019-01-27

## 2019-01-27 RX ORDER — METHYLERGONOVINE MALEATE 0.2 MG/ML
200 INJECTION INTRAVENOUS PRN
Status: CANCELLED | OUTPATIENT
Start: 2019-01-27

## 2019-01-27 RX ORDER — SODIUM CHLORIDE 0.9 % (FLUSH) 0.9 %
10 SYRINGE (ML) INJECTION PRN
Status: CANCELLED | OUTPATIENT
Start: 2019-01-27

## 2019-01-27 RX ORDER — ONDANSETRON 2 MG/ML
4 INJECTION INTRAMUSCULAR; INTRAVENOUS EVERY 6 HOURS PRN
Status: CANCELLED | OUTPATIENT
Start: 2019-01-27

## 2019-01-27 RX ORDER — LIDOCAINE HYDROCHLORIDE 10 MG/ML
30 INJECTION, SOLUTION EPIDURAL; INFILTRATION; INTRACAUDAL; PERINEURAL PRN
Status: CANCELLED | OUTPATIENT
Start: 2019-01-27

## 2019-01-27 RX ORDER — CARBOPROST TROMETHAMINE 250 UG/ML
250 INJECTION, SOLUTION INTRAMUSCULAR PRN
Status: CANCELLED | OUTPATIENT
Start: 2019-01-27

## 2019-01-27 RX ORDER — SEVOFLURANE 250 ML/250ML
1 LIQUID RESPIRATORY (INHALATION) CONTINUOUS PRN
Status: CANCELLED | OUTPATIENT
Start: 2019-01-27

## 2019-01-27 RX ORDER — SODIUM CHLORIDE 0.9 % (FLUSH) 0.9 %
10 SYRINGE (ML) INJECTION EVERY 12 HOURS SCHEDULED
Status: CANCELLED | OUTPATIENT
Start: 2019-01-27

## 2019-01-28 ENCOUNTER — ANESTHESIA EVENT (OUTPATIENT)
Dept: LABOR AND DELIVERY | Age: 19
DRG: 560 | End: 2019-01-28
Payer: COMMERCIAL

## 2019-01-28 ENCOUNTER — HOSPITAL ENCOUNTER (INPATIENT)
Age: 19
LOS: 2 days | Discharge: HOME | DRG: 560 | End: 2019-01-30
Attending: ADVANCED PRACTICE MIDWIFE | Admitting: ADVANCED PRACTICE MIDWIFE
Payer: COMMERCIAL

## 2019-01-28 ENCOUNTER — ANESTHESIA (OUTPATIENT)
Dept: LABOR AND DELIVERY | Age: 19
DRG: 560 | End: 2019-01-28
Payer: COMMERCIAL

## 2019-01-28 PROBLEM — Z34.90 ENCOUNTER FOR ELECTIVE INDUCTION OF LABOR: Status: ACTIVE | Noted: 2019-01-28

## 2019-01-28 LAB
ABSOLUTE EOS #: 0.04 K/UL (ref 0–0.44)
ABSOLUTE IMMATURE GRANULOCYTE: 0.14 K/UL (ref 0–0.3)
ABSOLUTE LYMPH #: 1.35 K/UL (ref 1.1–3.7)
ABSOLUTE MONO #: 0.54 K/UL (ref 0.1–1.4)
AMPHETAMINE SCREEN URINE: NEGATIVE
BARBITURATE SCREEN URINE: NEGATIVE
BASOPHILS # BLD: 0 % (ref 0–2)
BASOPHILS ABSOLUTE: 0.03 K/UL (ref 0–0.2)
BENZODIAZEPINE SCREEN, URINE: NEGATIVE
BUPRENORPHINE URINE: NEGATIVE
CANNABINOID SCREEN URINE: NEGATIVE
COCAINE METABOLITE, URINE: NEGATIVE
DIFFERENTIAL TYPE: ABNORMAL
EOSINOPHILS RELATIVE PERCENT: 0 % (ref 1–4)
HCT VFR BLD CALC: 38.3 % (ref 36.3–47.1)
HEMOGLOBIN: 12.6 G/DL (ref 11.9–15.1)
IMMATURE GRANULOCYTES: 1 %
LYMPHOCYTES # BLD: 10 % (ref 25–45)
MCH RBC QN AUTO: 29.6 PG (ref 25.2–33.5)
MCHC RBC AUTO-ENTMCNC: 32.9 G/DL (ref 28.4–34.8)
MCV RBC AUTO: 90.1 FL (ref 82.6–102.9)
MDMA URINE: NORMAL
METHADONE SCREEN, URINE: NEGATIVE
METHAMPHETAMINE, URINE: NEGATIVE
MONOCYTES # BLD: 4 % (ref 2–8)
NRBC AUTOMATED: 0 PER 100 WBC
OPIATES, URINE: NEGATIVE
OXYCODONE SCREEN URINE: NEGATIVE
PDW BLD-RTO: 14.4 % (ref 11.8–14.4)
PHENCYCLIDINE, URINE: NEGATIVE
PLATELET # BLD: 287 K/UL (ref 138–453)
PLATELET ESTIMATE: ABNORMAL
PMV BLD AUTO: 10.8 FL (ref 8.1–13.5)
PROPOXYPHENE, URINE: NEGATIVE
RBC # BLD: 4.25 M/UL (ref 3.95–5.11)
RBC # BLD: ABNORMAL 10*6/UL
SEG NEUTROPHILS: 85 % (ref 34–64)
SEGMENTED NEUTROPHILS ABSOLUTE COUNT: 11.24 K/UL (ref 1.5–8.1)
TEST INFORMATION: NORMAL
TRICYCLIC ANTIDEPRESSANTS, UR: NEGATIVE
WBC # BLD: 13.3 K/UL (ref 4.5–13.5)
WBC # BLD: ABNORMAL 10*3/UL

## 2019-01-28 PROCEDURE — 59409 OBSTETRICAL CARE: CPT | Performed by: ADVANCED PRACTICE MIDWIFE

## 2019-01-28 PROCEDURE — 7200000001 HC VAGINAL DELIVERY

## 2019-01-28 PROCEDURE — 1220000000 HC SEMI PRIVATE OB R&B

## 2019-01-28 PROCEDURE — 85461 HEMOGLOBIN FETAL: CPT

## 2019-01-28 PROCEDURE — 6360000002 HC RX W HCPCS: Performed by: ADVANCED PRACTICE MIDWIFE

## 2019-01-28 PROCEDURE — 88307 TISSUE EXAM BY PATHOLOGIST: CPT

## 2019-01-28 PROCEDURE — 3700000025 ANESTHESIA EPIDURAL BLOCK: Performed by: NURSE ANESTHETIST, CERTIFIED REGISTERED

## 2019-01-28 PROCEDURE — 2500000003 HC RX 250 WO HCPCS: Performed by: NURSE ANESTHETIST, CERTIFIED REGISTERED

## 2019-01-28 PROCEDURE — 10907ZC DRAINAGE OF AMNIOTIC FLUID, THERAPEUTIC FROM PRODUCTS OF CONCEPTION, VIA NATURAL OR ARTIFICIAL OPENING: ICD-10-PCS | Performed by: OBSTETRICS & GYNECOLOGY

## 2019-01-28 PROCEDURE — 51702 INSERT TEMP BLADDER CATH: CPT

## 2019-01-28 PROCEDURE — 86850 RBC ANTIBODY SCREEN: CPT

## 2019-01-28 PROCEDURE — 86901 BLOOD TYPING SEROLOGIC RH(D): CPT

## 2019-01-28 PROCEDURE — 85025 COMPLETE CBC W/AUTO DIFF WBC: CPT

## 2019-01-28 PROCEDURE — 2500000003 HC RX 250 WO HCPCS: Performed by: OBSTETRICS & GYNECOLOGY

## 2019-01-28 PROCEDURE — 3E033VJ INTRODUCTION OF OTHER HORMONE INTO PERIPHERAL VEIN, PERCUTANEOUS APPROACH: ICD-10-PCS | Performed by: OBSTETRICS & GYNECOLOGY

## 2019-01-28 PROCEDURE — 86870 RBC ANTIBODY IDENTIFICATION: CPT

## 2019-01-28 PROCEDURE — 86900 BLOOD TYPING SEROLOGIC ABO: CPT

## 2019-01-28 PROCEDURE — 2580000003 HC RX 258: Performed by: ADVANCED PRACTICE MIDWIFE

## 2019-01-28 PROCEDURE — 6370000000 HC RX 637 (ALT 250 FOR IP): Performed by: ADVANCED PRACTICE MIDWIFE

## 2019-01-28 PROCEDURE — 80306 DRUG TEST PRSMV INSTRMNT: CPT

## 2019-01-28 PROCEDURE — 6360000002 HC RX W HCPCS: Performed by: NURSE ANESTHETIST, CERTIFIED REGISTERED

## 2019-01-28 PROCEDURE — 36415 COLL VENOUS BLD VENIPUNCTURE: CPT

## 2019-01-28 RX ORDER — CARBOPROST TROMETHAMINE 250 UG/ML
250 INJECTION, SOLUTION INTRAMUSCULAR PRN
Status: DISCONTINUED | OUTPATIENT
Start: 2019-01-28 | End: 2019-01-30 | Stop reason: HOSPADM

## 2019-01-28 RX ORDER — ROPIVACAINE HYDROCHLORIDE 2 MG/ML
INJECTION, SOLUTION EPIDURAL; INFILTRATION; PERINEURAL CONTINUOUS PRN
Status: DISCONTINUED | OUTPATIENT
Start: 2019-01-28 | End: 2019-01-28 | Stop reason: SDUPTHER

## 2019-01-28 RX ORDER — ACETAMINOPHEN 325 MG/1
650 TABLET ORAL EVERY 4 HOURS PRN
Status: DISCONTINUED | OUTPATIENT
Start: 2019-01-28 | End: 2019-01-30 | Stop reason: HOSPADM

## 2019-01-28 RX ORDER — SODIUM CHLORIDE 0.9 % (FLUSH) 0.9 %
10 SYRINGE (ML) INJECTION PRN
Status: DISCONTINUED | OUTPATIENT
Start: 2019-01-28 | End: 2019-01-30 | Stop reason: HOSPADM

## 2019-01-28 RX ORDER — LIDOCAINE HYDROCHLORIDE 10 MG/ML
30 INJECTION, SOLUTION EPIDURAL; INFILTRATION; INTRACAUDAL; PERINEURAL PRN
Status: DISCONTINUED | OUTPATIENT
Start: 2019-01-28 | End: 2019-01-30 | Stop reason: HOSPADM

## 2019-01-28 RX ORDER — METHYLERGONOVINE MALEATE 0.2 MG/ML
200 INJECTION INTRAVENOUS PRN
Status: DISCONTINUED | OUTPATIENT
Start: 2019-01-28 | End: 2019-01-30 | Stop reason: HOSPADM

## 2019-01-28 RX ORDER — SODIUM CHLORIDE, SODIUM LACTATE, POTASSIUM CHLORIDE, CALCIUM CHLORIDE 600; 310; 30; 20 MG/100ML; MG/100ML; MG/100ML; MG/100ML
INJECTION, SOLUTION INTRAVENOUS CONTINUOUS
Status: DISCONTINUED | OUTPATIENT
Start: 2019-01-28 | End: 2019-01-30 | Stop reason: HOSPADM

## 2019-01-28 RX ORDER — SODIUM CHLORIDE 0.9 % (FLUSH) 0.9 %
10 SYRINGE (ML) INJECTION EVERY 12 HOURS SCHEDULED
Status: DISCONTINUED | OUTPATIENT
Start: 2019-01-28 | End: 2019-01-30 | Stop reason: HOSPADM

## 2019-01-28 RX ORDER — NALOXONE HYDROCHLORIDE 0.4 MG/ML
0.4 INJECTION, SOLUTION INTRAMUSCULAR; INTRAVENOUS; SUBCUTANEOUS PRN
Status: DISCONTINUED | OUTPATIENT
Start: 2019-01-28 | End: 2019-01-30 | Stop reason: HOSPADM

## 2019-01-28 RX ORDER — LIDOCAINE HYDROCHLORIDE AND EPINEPHRINE 20; 5 MG/ML; UG/ML
INJECTION, SOLUTION EPIDURAL; INFILTRATION; INTRACAUDAL; PERINEURAL PRN
Status: DISCONTINUED | OUTPATIENT
Start: 2019-01-28 | End: 2019-01-28 | Stop reason: SDUPTHER

## 2019-01-28 RX ORDER — DOCUSATE SODIUM 100 MG/1
100 CAPSULE, LIQUID FILLED ORAL 2 TIMES DAILY PRN
Status: DISCONTINUED | OUTPATIENT
Start: 2019-01-28 | End: 2019-01-30 | Stop reason: HOSPADM

## 2019-01-28 RX ORDER — IBUPROFEN 800 MG/1
800 TABLET ORAL EVERY 8 HOURS
Status: DISCONTINUED | OUTPATIENT
Start: 2019-01-28 | End: 2019-01-30 | Stop reason: HOSPADM

## 2019-01-28 RX ORDER — ONDANSETRON 2 MG/ML
4 INJECTION INTRAMUSCULAR; INTRAVENOUS EVERY 6 HOURS PRN
Status: DISCONTINUED | OUTPATIENT
Start: 2019-01-28 | End: 2019-01-30 | Stop reason: HOSPADM

## 2019-01-28 RX ORDER — ROPIVACAINE HYDROCHLORIDE 2 MG/ML
12 INJECTION, SOLUTION EPIDURAL; INFILTRATION; PERINEURAL ONCE
Status: DISCONTINUED | OUTPATIENT
Start: 2019-01-28 | End: 2019-01-30 | Stop reason: HOSPADM

## 2019-01-28 RX ORDER — LANOLIN 100 %
OINTMENT (GRAM) TOPICAL PRN
Status: DISCONTINUED | OUTPATIENT
Start: 2019-01-28 | End: 2019-01-30 | Stop reason: HOSPADM

## 2019-01-28 RX ORDER — NALBUPHINE HCL 10 MG/ML
10 AMPUL (ML) INJECTION
Status: DISCONTINUED | OUTPATIENT
Start: 2019-01-28 | End: 2019-01-30 | Stop reason: HOSPADM

## 2019-01-28 RX ORDER — METHYLERGONOVINE MALEATE 0.2 MG/ML
200 INJECTION INTRAVENOUS
Status: ACTIVE | OUTPATIENT
Start: 2019-01-28 | End: 2019-01-28

## 2019-01-28 RX ORDER — EPHEDRINE SULFATE 50 MG/ML
5 INJECTION, SOLUTION INTRAVENOUS EVERY 5 MIN PRN
Status: DISCONTINUED | OUTPATIENT
Start: 2019-01-28 | End: 2019-01-30 | Stop reason: HOSPADM

## 2019-01-28 RX ORDER — SEVOFLURANE 250 ML/250ML
1 LIQUID RESPIRATORY (INHALATION) CONTINUOUS PRN
Status: DISCONTINUED | OUTPATIENT
Start: 2019-01-28 | End: 2019-01-30 | Stop reason: HOSPADM

## 2019-01-28 RX ORDER — MISOPROSTOL 100 UG/1
900 TABLET ORAL PRN
Status: DISCONTINUED | OUTPATIENT
Start: 2019-01-28 | End: 2019-01-30 | Stop reason: HOSPADM

## 2019-01-28 RX ORDER — FENTANYL CITRATE 50 UG/ML
INJECTION, SOLUTION INTRAMUSCULAR; INTRAVENOUS PRN
Status: DISCONTINUED | OUTPATIENT
Start: 2019-01-28 | End: 2019-01-28 | Stop reason: SDUPTHER

## 2019-01-28 RX ORDER — SODIUM CHLORIDE, SODIUM LACTATE, POTASSIUM CHLORIDE, CALCIUM CHLORIDE 600; 310; 30; 20 MG/100ML; MG/100ML; MG/100ML; MG/100ML
500 INJECTION, SOLUTION INTRAVENOUS ONCE
Status: DISCONTINUED | OUTPATIENT
Start: 2019-01-28 | End: 2019-01-30 | Stop reason: HOSPADM

## 2019-01-28 RX ADMIN — LIDOCAINE HYDROCHLORIDE,EPINEPHRINE BITARTRATE 2 ML: 20; .005 INJECTION, SOLUTION EPIDURAL; INFILTRATION; INTRACAUDAL; PERINEURAL at 12:48

## 2019-01-28 RX ADMIN — FENTANYL CITRATE 100 MCG: 50 INJECTION INTRAMUSCULAR; INTRAVENOUS at 11:51

## 2019-01-28 RX ADMIN — LIDOCAINE HYDROCHLORIDE,EPINEPHRINE BITARTRATE 4 ML: 20; .005 INJECTION, SOLUTION EPIDURAL; INFILTRATION; INTRACAUDAL; PERINEURAL at 10:03

## 2019-01-28 RX ADMIN — DEXTROSE MONOHYDRATE 5 MILLION UNITS: 50 INJECTION, SOLUTION INTRAVENOUS at 06:25

## 2019-01-28 RX ADMIN — LIDOCAINE HYDROCHLORIDE,EPINEPHRINE BITARTRATE 3 ML: 20; .005 INJECTION, SOLUTION EPIDURAL; INFILTRATION; INTRACAUDAL; PERINEURAL at 12:40

## 2019-01-28 RX ADMIN — LIDOCAINE HYDROCHLORIDE,EPINEPHRINE BITARTRATE 3 ML: 20; .005 INJECTION, SOLUTION EPIDURAL; INFILTRATION; INTRACAUDAL; PERINEURAL at 12:43

## 2019-01-28 RX ADMIN — DEXTROSE MONOHYDRATE 2.5 MILLION UNITS: 50 INJECTION, SOLUTION INTRAVENOUS at 11:04

## 2019-01-28 RX ADMIN — LIDOCAINE HYDROCHLORIDE,EPINEPHRINE BITARTRATE 3 ML: 20; .005 INJECTION, SOLUTION EPIDURAL; INFILTRATION; INTRACAUDAL; PERINEURAL at 10:00

## 2019-01-28 RX ADMIN — IBUPROFEN 800 MG: 800 TABLET, FILM COATED ORAL at 21:15

## 2019-01-28 RX ADMIN — SODIUM CHLORIDE, POTASSIUM CHLORIDE, SODIUM LACTATE AND CALCIUM CHLORIDE: 600; 310; 30; 20 INJECTION, SOLUTION INTRAVENOUS at 06:08

## 2019-01-28 RX ADMIN — EPHEDRINE SULFATE 5 MG: 50 INJECTION INTRAMUSCULAR; INTRAVENOUS; SUBCUTANEOUS at 14:30

## 2019-01-28 RX ADMIN — Medication 1 MILLI-UNITS/MIN: at 06:30

## 2019-01-28 RX ADMIN — ROPIVACAINE HYDROCHLORIDE 12 ML/HR: 2 INJECTION, SOLUTION EPIDURAL; INFILTRATION at 10:14

## 2019-01-28 RX ADMIN — DEXTROSE MONOHYDRATE 2.5 MILLION UNITS: 50 INJECTION, SOLUTION INTRAVENOUS at 14:32

## 2019-01-28 RX ADMIN — LIDOCAINE HYDROCHLORIDE,EPINEPHRINE BITARTRATE 3 ML: 20; .005 INJECTION, SOLUTION EPIDURAL; INFILTRATION; INTRACAUDAL; PERINEURAL at 09:56

## 2019-01-28 RX ADMIN — LIDOCAINE HYDROCHLORIDE,EPINEPHRINE BITARTRATE 3 ML: 20; .005 INJECTION, SOLUTION EPIDURAL; INFILTRATION; INTRACAUDAL; PERINEURAL at 10:07

## 2019-01-28 RX ADMIN — LIDOCAINE HYDROCHLORIDE,EPINEPHRINE BITARTRATE 3 ML: 20; .005 INJECTION, SOLUTION EPIDURAL; INFILTRATION; INTRACAUDAL; PERINEURAL at 11:57

## 2019-01-28 RX ADMIN — LIDOCAINE HYDROCHLORIDE,EPINEPHRINE BITARTRATE 3 ML: 20; .005 INJECTION, SOLUTION EPIDURAL; INFILTRATION; INTRACAUDAL; PERINEURAL at 11:55

## 2019-01-28 RX ADMIN — SODIUM CHLORIDE, POTASSIUM CHLORIDE, SODIUM LACTATE AND CALCIUM CHLORIDE: 600; 310; 30; 20 INJECTION, SOLUTION INTRAVENOUS at 09:45

## 2019-01-28 RX ADMIN — LIDOCAINE HYDROCHLORIDE,EPINEPHRINE BITARTRATE 4 ML: 20; .005 INJECTION, SOLUTION EPIDURAL; INFILTRATION; INTRACAUDAL; PERINEURAL at 11:53

## 2019-01-28 RX ADMIN — SODIUM CHLORIDE, POTASSIUM CHLORIDE, SODIUM LACTATE AND CALCIUM CHLORIDE: 600; 310; 30; 20 INJECTION, SOLUTION INTRAVENOUS at 12:49

## 2019-01-28 RX ADMIN — LIDOCAINE HYDROCHLORIDE,EPINEPHRINE BITARTRATE 3 ML: 20; .005 INJECTION, SOLUTION EPIDURAL; INFILTRATION; INTRACAUDAL; PERINEURAL at 10:11

## 2019-01-28 ASSESSMENT — PAIN SCALES - GENERAL: PAINLEVEL_OUTOF10: 4

## 2019-01-28 ASSESSMENT — LIFESTYLE VARIABLES: SMOKING_STATUS: 1

## 2019-01-29 PROCEDURE — 6360000002 HC RX W HCPCS: Performed by: ADVANCED PRACTICE MIDWIFE

## 2019-01-29 PROCEDURE — 99024 POSTOP FOLLOW-UP VISIT: CPT | Performed by: ADVANCED PRACTICE MIDWIFE

## 2019-01-29 PROCEDURE — 1220000000 HC SEMI PRIVATE OB R&B

## 2019-01-29 PROCEDURE — 6370000000 HC RX 637 (ALT 250 FOR IP): Performed by: ADVANCED PRACTICE MIDWIFE

## 2019-01-29 PROCEDURE — 96372 THER/PROPH/DIAG INJ SC/IM: CPT

## 2019-01-29 RX ADMIN — IBUPROFEN 800 MG: 800 TABLET, FILM COATED ORAL at 09:05

## 2019-01-29 RX ADMIN — HUMAN RHO(D) IMMUNE GLOBULIN 300 MCG: 300 INJECTION, SOLUTION INTRAMUSCULAR at 22:59

## 2019-01-29 RX ADMIN — Medication: at 16:12

## 2019-01-29 RX ADMIN — ACETAMINOPHEN 650 MG: 325 TABLET, FILM COATED ORAL at 12:40

## 2019-01-29 ASSESSMENT — PAIN SCALES - GENERAL
PAINLEVEL_OUTOF10: 4
PAINLEVEL_OUTOF10: 4

## 2019-01-30 VITALS
WEIGHT: 193 LBS | RESPIRATION RATE: 14 BRPM | SYSTOLIC BLOOD PRESSURE: 100 MMHG | OXYGEN SATURATION: 97 % | HEIGHT: 61 IN | DIASTOLIC BLOOD PRESSURE: 55 MMHG | TEMPERATURE: 98 F | HEART RATE: 78 BPM | BODY MASS INDEX: 36.44 KG/M2

## 2019-01-30 LAB
ABO/RH: NORMAL
ANTIBODY IDENTIFICATION: NORMAL
ANTIBODY SCREEN: POSITIVE
BLD PROD TYP BPU: NORMAL
BLD PROD TYP BPU: NORMAL
DISPENSE STATUS BLOOD BANK: NORMAL
DU ANTIGEN: NORMAL
FETAL ROSETTE: NEGATIVE
HISTORY CHECK: NORMAL
Lab: 1
RHIG ELIGIBILITY: NORMAL
SURGICAL PATHOLOGY REPORT: NORMAL
TRANSFUSION STATUS: NORMAL
UNIT DIVISION: 0
UNIT NUMBER: NORMAL

## 2019-01-30 PROCEDURE — 99024 POSTOP FOLLOW-UP VISIT: CPT | Performed by: ADVANCED PRACTICE MIDWIFE

## 2019-03-11 ENCOUNTER — POSTPARTUM VISIT (OUTPATIENT)
Dept: OBGYN | Age: 19
End: 2019-03-11
Payer: COMMERCIAL

## 2019-03-11 VITALS
BODY MASS INDEX: 32.47 KG/M2 | SYSTOLIC BLOOD PRESSURE: 114 MMHG | DIASTOLIC BLOOD PRESSURE: 68 MMHG | WEIGHT: 172 LBS | HEIGHT: 61 IN

## 2019-03-11 LAB
CONTROL: NORMAL
HGB, POC: 10.6
PREGNANCY TEST URINE, POC: NEGATIVE

## 2019-03-11 RX ORDER — MEDROXYPROGESTERONE ACETATE 150 MG/ML
150 INJECTION, SUSPENSION INTRAMUSCULAR ONCE
Status: COMPLETED | OUTPATIENT
Start: 2019-03-11 | End: 2019-03-11

## 2019-03-11 RX ADMIN — MEDROXYPROGESTERONE ACETATE 150 MG: 150 INJECTION, SUSPENSION INTRAMUSCULAR at 14:30

## 2019-06-03 ENCOUNTER — NURSE ONLY (OUTPATIENT)
Dept: OBGYN | Age: 19
End: 2019-06-03
Payer: COMMERCIAL

## 2019-06-03 VITALS
WEIGHT: 167.6 LBS | DIASTOLIC BLOOD PRESSURE: 70 MMHG | HEIGHT: 61 IN | SYSTOLIC BLOOD PRESSURE: 112 MMHG | BODY MASS INDEX: 31.64 KG/M2

## 2019-06-03 DIAGNOSIS — N94.6 DYSMENORRHEA: Primary | ICD-10-CM

## 2019-06-03 PROCEDURE — 96372 THER/PROPH/DIAG INJ SC/IM: CPT | Performed by: ADVANCED PRACTICE MIDWIFE

## 2019-06-03 RX ORDER — MEDROXYPROGESTERONE ACETATE 150 MG/ML
150 INJECTION, SUSPENSION INTRAMUSCULAR ONCE
Status: COMPLETED | OUTPATIENT
Start: 2019-06-03 | End: 2019-06-03

## 2019-06-03 RX ADMIN — MEDROXYPROGESTERONE ACETATE 150 MG: 150 INJECTION, SUSPENSION INTRAMUSCULAR at 16:16

## 2019-06-03 NOTE — PROGRESS NOTES
Nurse visit Depo    Date of service: 6/3/2019    Almas Tyler  Is a 22 y.o.  female    PT's PCP is: No primary care provider on file. : 2000                                             Subjective:       No LMP recorded. Allergies: Soap and Silver    Chief Complaint   Patient presents with    Injections     Depo Provera 150mg IM- RIGHT DELT. Office Supplied       Last yearly: n/a    Last pap: n/a     Last HPV:n/a  ( if due for pap schedule pap)    LAST DEPO: 3/11/19 ( if past 13 weeks and not on period please talk with provider)    PE:  Vital Signs  Blood pressure 112/70, height 5' 1\" (1.549 m), weight 167 lb 9.6 oz (76 kg), not currently breastfeeding. Labs:    No results found for this visit on 19.       Yes  PT denies fever, chills, nausea and vomiting                            Assessment and Plan               Depo was: Office supplied      NURSE: [address] [place] [address]

## 2019-08-27 ENCOUNTER — HOSPITAL ENCOUNTER (EMERGENCY)
Age: 19
Discharge: HOME | End: 2019-08-27
Attending: EMERGENCY MEDICINE

## 2019-08-27 VITALS
OXYGEN SATURATION: 99 % | DIASTOLIC BLOOD PRESSURE: 61 MMHG | HEART RATE: 95 BPM | WEIGHT: 150 LBS | BODY MASS INDEX: 28.34 KG/M2 | TEMPERATURE: 98.4 F | SYSTOLIC BLOOD PRESSURE: 110 MMHG | RESPIRATION RATE: 16 BRPM

## 2019-08-27 DIAGNOSIS — N30.90 CYSTITIS: Primary | ICD-10-CM

## 2019-08-27 DIAGNOSIS — N89.8 VAGINAL IRRITATION: ICD-10-CM

## 2019-08-27 LAB
-: ABNORMAL
AMORPHOUS: ABNORMAL
BACTERIA: ABNORMAL
BILIRUBIN URINE: NEGATIVE
CASTS UA: ABNORMAL /LPF
COLOR: YELLOW
COMMENT UA: ABNORMAL
CRYSTALS, UA: ABNORMAL /HPF
EPITHELIAL CELLS UA: ABNORMAL /HPF (ref 0–25)
GLUCOSE URINE: NEGATIVE
HCG(URINE) PREGNANCY TEST: NEGATIVE
KETONES, URINE: NEGATIVE
LEUKOCYTE ESTERASE, URINE: ABNORMAL
MUCUS: ABNORMAL
NITRITE, URINE: NEGATIVE
OTHER OBSERVATIONS UA: ABNORMAL
PH UA: 6.5 (ref 5–9)
PROTEIN UA: NEGATIVE
RBC UA: ABNORMAL /HPF (ref 0–2)
RENAL EPITHELIAL, UA: ABNORMAL /HPF
SPECIFIC GRAVITY UA: <1.005 (ref 1.01–1.02)
TRICHOMONAS: ABNORMAL
TURBIDITY: CLEAR
URINE HGB: ABNORMAL
UROBILINOGEN, URINE: NORMAL
WBC UA: ABNORMAL /HPF (ref 0–5)
YEAST: ABNORMAL

## 2019-08-27 PROCEDURE — 81025 URINE PREGNANCY TEST: CPT

## 2019-08-27 PROCEDURE — 81001 URINALYSIS AUTO W/SCOPE: CPT

## 2019-08-27 PROCEDURE — 99283 EMERGENCY DEPT VISIT LOW MDM: CPT

## 2019-08-27 RX ORDER — DIPHENHYDRAMINE HCL 25 MG
25 CAPSULE ORAL EVERY 6 HOURS PRN
Qty: 20 CAPSULE | Refills: 0 | Status: SHIPPED | OUTPATIENT
Start: 2019-08-27 | End: 2019-09-06

## 2019-08-27 RX ORDER — PREDNISONE 20 MG/1
20 TABLET ORAL 2 TIMES DAILY
Qty: 10 TABLET | Refills: 0 | Status: SHIPPED | OUTPATIENT
Start: 2019-08-27 | End: 2019-09-01

## 2019-08-27 RX ORDER — NITROFURANTOIN 25; 75 MG/1; MG/1
100 CAPSULE ORAL 2 TIMES DAILY
Qty: 20 CAPSULE | Refills: 0 | Status: SHIPPED | OUTPATIENT
Start: 2019-08-27 | End: 2019-09-06

## 2019-08-27 ASSESSMENT — PAIN DESCRIPTION - PAIN TYPE: TYPE: ACUTE PAIN

## 2019-08-27 ASSESSMENT — PAIN SCALES - GENERAL: PAINLEVEL_OUTOF10: 6

## 2019-08-27 ASSESSMENT — PAIN DESCRIPTION - LOCATION: LOCATION: VAGINA

## 2019-08-27 NOTE — ED PROVIDER NOTES
Certain brands    Silver Rash     \"petra silver\"      SOCIAL AND FAMILY HISTORY   Social History     Socioeconomic History    Marital status: Single     Spouse name: Not on file    Number of children: Not on file    Years of education: Not on file    Highest education level: Not on file   Occupational History    Not on file   Social Needs    Financial resource strain: Not on file    Food insecurity:     Worry: Not on file     Inability: Not on file    Transportation needs:     Medical: Not on file     Non-medical: Not on file   Tobacco Use    Smoking status: Current Every Day Smoker     Packs/day: 0.50     Years: 2.00     Pack years: 1.00     Types: Cigarettes    Smokeless tobacco: Never Used    Tobacco comment: trying to quit on own   Substance and Sexual Activity    Alcohol use: No     Alcohol/week: 0.0 standard drinks     Comment: occasionally with family, hardly ever    Drug use: No    Sexual activity: Yes     Partners: Male     Birth control/protection: Condom   Lifestyle    Physical activity:     Days per week: Not on file     Minutes per session: Not on file    Stress: Not on file   Relationships    Social connections:     Talks on phone: Not on file     Gets together: Not on file     Attends [employer] service: Not on file     Active member of club or organization: Not on file     Attends meetings of clubs or organizations: Not on file     Relationship status: Not on file    Intimate partner violence:     Fear of current or ex partner: Not on file     Emotionally abused: Not on file     Physically abused: Not on file     Forced sexual activity: Not on file   Other Topics Concern    Not on file   Social History Narrative    Not on file     Family History   Problem Relation Age of Onset    Allergies Mother     Crohn's Disease Mother     High Cholesterol Father     Migraines Father     Stroke Father     Heart Disease Father     Cancer Father     Diabetes Father     Cancer

## 2019-08-28 ENCOUNTER — OFFICE VISIT (OUTPATIENT)
Dept: OBGYN | Age: 19
End: 2019-08-28
Payer: COMMERCIAL

## 2019-08-28 ENCOUNTER — HOSPITAL ENCOUNTER (OUTPATIENT)
Age: 19
Setting detail: SPECIMEN
Discharge: HOME | End: 2019-08-28

## 2019-08-28 VITALS
DIASTOLIC BLOOD PRESSURE: 62 MMHG | BODY MASS INDEX: 31.57 KG/M2 | HEIGHT: 61 IN | WEIGHT: 167.2 LBS | SYSTOLIC BLOOD PRESSURE: 118 MMHG

## 2019-08-28 DIAGNOSIS — N76.0 VULVOVAGINITIS: ICD-10-CM

## 2019-08-28 DIAGNOSIS — N94.6 DYSMENORRHEA: Primary | ICD-10-CM

## 2019-08-28 LAB
DIRECT EXAM: ABNORMAL
Lab: ABNORMAL
SPECIMEN DESCRIPTION: ABNORMAL

## 2019-08-28 PROCEDURE — 87480 CANDIDA DNA DIR PROBE: CPT

## 2019-08-28 PROCEDURE — 87070 CULTURE OTHR SPECIMN AEROBIC: CPT

## 2019-08-28 PROCEDURE — 87660 TRICHOMONAS VAGIN DIR PROBE: CPT

## 2019-08-28 PROCEDURE — 87529 HSV DNA AMP PROBE: CPT

## 2019-08-28 PROCEDURE — 99214 OFFICE O/P EST MOD 30 MIN: CPT | Performed by: ADVANCED PRACTICE MIDWIFE

## 2019-08-28 PROCEDURE — 87510 GARDNER VAG DNA DIR PROBE: CPT

## 2019-08-28 RX ORDER — MEDROXYPROGESTERONE ACETATE 150 MG/ML
150 INJECTION, SUSPENSION INTRAMUSCULAR
COMMUNITY
End: 2020-01-14 | Stop reason: ALTCHOICE

## 2019-08-28 RX ORDER — NORETHINDRONE ACETATE AND ETHINYL ESTRADIOL 1.5-30(21)
1 KIT ORAL DAILY
Qty: 1 PACKET | Refills: 3 | Status: SHIPPED | OUTPATIENT
Start: 2019-08-28 | End: 2020-01-14 | Stop reason: ALTCHOICE

## 2019-08-28 RX ORDER — FLUCONAZOLE 150 MG/1
TABLET ORAL
Qty: 3 TABLET | Refills: 0 | Status: SHIPPED | OUTPATIENT
Start: 2019-08-28 | End: 2020-01-14 | Stop reason: ALTCHOICE

## 2019-08-28 RX ORDER — CLOTRIMAZOLE AND BETAMETHASONE DIPROPIONATE 10; .64 MG/G; MG/G
CREAM TOPICAL
Qty: 45 G | Refills: 1 | Status: SHIPPED | OUTPATIENT
Start: 2019-08-28 | End: 2020-01-28 | Stop reason: ALTCHOICE

## 2019-08-28 ASSESSMENT — PATIENT HEALTH QUESTIONNAIRE - PHQ9
SUM OF ALL RESPONSES TO PHQ QUESTIONS 1-9: 0
SUM OF ALL RESPONSES TO PHQ9 QUESTIONS 1 & 2: 0
2. FEELING DOWN, DEPRESSED OR HOPELESS: 0
1. LITTLE INTEREST OR PLEASURE IN DOING THINGS: 0
SUM OF ALL RESPONSES TO PHQ QUESTIONS 1-9: 0

## 2019-08-28 NOTE — PROGRESS NOTES
Disp: 10 tablet, Rfl: 0    ALBUTEROL SULFATE HFA IN, Inhale 2 puffs into the lungs as needed, Disp: , Rfl:     Social History     Substance and Sexual Activity   Sexual Activity Yes    Partners: Male    Birth control/protection: Injection       Last Yearly:  2019    Last pap: na    Last HPV: na    Chief Complaint   Patient presents with    Rash     C/O sore rash on vulva for last several days. Recently switched pads. Was in ER yesterday and was given Benadryl and prescriptions that she has not yet picked up. PE:  Vital Signs  Blood pressure 118/62, height 5' 1\" (1.549 m), weight 167 lb 3.2 oz (75.8 kg), last menstrual period 08/21/2019, not currently breastfeeding. NURSE: Latrice PARKER    HPI: here for f/u er for vulvar irritation     PT denies fever, chills, nausea and vomiting       Objective  Lymphatic:   no lymphadenopathy   Pelvic Exam: GENITAL/URINARY:  External Genitalia:  Hair distribution; normal, l. Majora reddened bilateral and homogenously, no open areas, no fissures, no herpetic areas  Urethral Meatus:  Size normal, Location normal, Lesions absent, Prolapse absent  Urethra: Fullness absent, Masses absent  Bladder:  Fullness absent, Masses absent, Tenderness absent, Cystocele absent  Vagina:  General appearance normal, Estrogen effect normal, Lesions absent, Pelvic support normal, slight white discharge, no odor  Cervix:  General appearance normal, Lesions absent, Discharge absent, Tenderness absent, Enlargement absent, Nodularity absent  Uterus:  Size normal, Tenderness absent  Adenexa: Masses absent, Tenderness absent  Anus/Perineum:  Lesions absent and Masses absent                                                     Results reviewed today:    No results found for this visit on 08/28/19. Assessment and Plan          Diagnosis Orders   1. Dysmenorrhea  norethindrone-ethinyl estradiol-iron (LOESTRIN FE 1.5/30) 1.5-30 MG-MCG tablet   2.  Vulvovaginitis  clotrimazole-betamethasone

## 2019-08-29 LAB
HSV 1, NAAT: NEGATIVE
HSV 2, NAAT: NEGATIVE
SPECIMEN DESCRIPTION: NORMAL

## 2019-08-31 LAB
CULTURE: NORMAL
Lab: NORMAL
SPECIMEN DESCRIPTION: NORMAL

## 2020-01-06 ENCOUNTER — HOSPITAL ENCOUNTER (EMERGENCY)
Age: 20
Discharge: HOME | End: 2020-01-06

## 2020-01-06 VITALS
RESPIRATION RATE: 14 BRPM | TEMPERATURE: 97.2 F | DIASTOLIC BLOOD PRESSURE: 61 MMHG | SYSTOLIC BLOOD PRESSURE: 93 MMHG | WEIGHT: 126 LBS | OXYGEN SATURATION: 100 % | BODY MASS INDEX: 23.81 KG/M2 | HEART RATE: 83 BPM

## 2020-01-06 LAB
-: ABNORMAL
ABSOLUTE EOS #: 0.18 K/UL (ref 0–0.44)
ABSOLUTE IMMATURE GRANULOCYTE: 0.04 K/UL (ref 0–0.3)
ABSOLUTE LYMPH #: 1.73 K/UL (ref 1.1–3.7)
ABSOLUTE MONO #: 0.74 K/UL (ref 0.1–1.2)
ALBUMIN SERPL-MCNC: 4 G/DL (ref 3.5–5.2)
ALBUMIN/GLOBULIN RATIO: 1.6 (ref 1–2.5)
ALP BLD-CCNC: 108 U/L (ref 35–104)
ALT SERPL-CCNC: 10 U/L (ref 5–33)
AMORPHOUS: ABNORMAL
ANION GAP SERPL CALCULATED.3IONS-SCNC: 14 MMOL/L (ref 9–17)
AST SERPL-CCNC: 10 U/L
BACTERIA: ABNORMAL
BASOPHILS # BLD: 0 % (ref 0–2)
BASOPHILS ABSOLUTE: 0.04 K/UL (ref 0–0.2)
BILIRUB SERPL-MCNC: 0.37 MG/DL (ref 0.3–1.2)
BILIRUBIN URINE: NEGATIVE
BUN BLDV-MCNC: 4 MG/DL (ref 6–20)
BUN/CREAT BLD: 7 (ref 9–20)
CALCIUM SERPL-MCNC: 8.8 MG/DL (ref 8.6–10.4)
CASTS UA: ABNORMAL /LPF
CHLORIDE BLD-SCNC: 98 MMOL/L (ref 98–107)
CO2: 21 MMOL/L (ref 20–31)
COLOR: YELLOW
COMMENT UA: ABNORMAL
CREAT SERPL-MCNC: 0.55 MG/DL (ref 0.5–0.9)
CRYSTALS, UA: ABNORMAL /HPF
DIFFERENTIAL TYPE: ABNORMAL
EOSINOPHILS RELATIVE PERCENT: 2 % (ref 1–4)
EPITHELIAL CELLS UA: ABNORMAL /HPF (ref 0–25)
GFR AFRICAN AMERICAN: >60 ML/MIN
GFR NON-AFRICAN AMERICAN: >60 ML/MIN
GFR SERPL CREATININE-BSD FRML MDRD: ABNORMAL ML/MIN/{1.73_M2}
GFR SERPL CREATININE-BSD FRML MDRD: ABNORMAL ML/MIN/{1.73_M2}
GLUCOSE BLD-MCNC: 97 MG/DL (ref 70–99)
GLUCOSE URINE: NEGATIVE
HCG(URINE) PREGNANCY TEST: POSITIVE
HCT VFR BLD CALC: 37.4 % (ref 36.3–47.1)
HEMOGLOBIN: 11.9 G/DL (ref 11.9–15.1)
IMMATURE GRANULOCYTES: 0 %
KETONES, URINE: NEGATIVE
LEUKOCYTE ESTERASE, URINE: ABNORMAL
LYMPHOCYTES # BLD: 14 % (ref 24–43)
MCH RBC QN AUTO: 26.2 PG (ref 25.2–33.5)
MCHC RBC AUTO-ENTMCNC: 31.8 G/DL (ref 28.4–34.8)
MCV RBC AUTO: 82.4 FL (ref 82.6–102.9)
MONOCYTES # BLD: 6 % (ref 3–12)
MUCUS: ABNORMAL
NITRITE, URINE: POSITIVE
NRBC AUTOMATED: 0 PER 100 WBC
OTHER OBSERVATIONS UA: ABNORMAL
PDW BLD-RTO: 15.7 % (ref 11.8–14.4)
PH UA: 7 (ref 5–9)
PLATELET # BLD: 363 K/UL (ref 138–453)
PLATELET ESTIMATE: ABNORMAL
PMV BLD AUTO: 10.1 FL (ref 8.1–13.5)
POTASSIUM SERPL-SCNC: 3.5 MMOL/L (ref 3.7–5.3)
PROTEIN UA: NEGATIVE
RBC # BLD: 4.54 M/UL (ref 3.95–5.11)
RBC # BLD: ABNORMAL 10*6/UL
RBC UA: ABNORMAL /HPF (ref 0–2)
RENAL EPITHELIAL, UA: ABNORMAL /HPF
SEG NEUTROPHILS: 78 % (ref 36–65)
SEGMENTED NEUTROPHILS ABSOLUTE COUNT: 9.3 K/UL (ref 1.5–8.1)
SODIUM BLD-SCNC: 133 MMOL/L (ref 135–144)
SPECIFIC GRAVITY UA: 1.01 (ref 1.01–1.02)
TOTAL PROTEIN: 6.5 G/DL (ref 6.4–8.3)
TRICHOMONAS: ABNORMAL
TURBIDITY: CLEAR
URINE HGB: NEGATIVE
UROBILINOGEN, URINE: NORMAL
WBC # BLD: 12 K/UL (ref 3.5–11.3)
WBC # BLD: ABNORMAL 10*3/UL
WBC UA: ABNORMAL /HPF (ref 0–5)
YEAST: ABNORMAL

## 2020-01-06 PROCEDURE — 85025 COMPLETE CBC W/AUTO DIFF WBC: CPT

## 2020-01-06 PROCEDURE — 96374 THER/PROPH/DIAG INJ IV PUSH: CPT

## 2020-01-06 PROCEDURE — 6370000000 HC RX 637 (ALT 250 FOR IP): Performed by: PHYSICIAN ASSISTANT

## 2020-01-06 PROCEDURE — 6360000002 HC RX W HCPCS: Performed by: PHYSICIAN ASSISTANT

## 2020-01-06 PROCEDURE — 87186 SC STD MICRODIL/AGAR DIL: CPT

## 2020-01-06 PROCEDURE — 87086 URINE CULTURE/COLONY COUNT: CPT

## 2020-01-06 PROCEDURE — 87088 URINE BACTERIA CULTURE: CPT

## 2020-01-06 PROCEDURE — 81025 URINE PREGNANCY TEST: CPT

## 2020-01-06 PROCEDURE — 80053 COMPREHEN METABOLIC PANEL: CPT

## 2020-01-06 PROCEDURE — 99284 EMERGENCY DEPT VISIT MOD MDM: CPT

## 2020-01-06 PROCEDURE — 2580000003 HC RX 258: Performed by: PHYSICIAN ASSISTANT

## 2020-01-06 PROCEDURE — 81001 URINALYSIS AUTO W/SCOPE: CPT

## 2020-01-06 RX ORDER — CEPHALEXIN 500 MG/1
500 CAPSULE ORAL 2 TIMES DAILY
Qty: 14 CAPSULE | Refills: 0 | Status: SHIPPED | OUTPATIENT
Start: 2020-01-06 | End: 2020-01-13

## 2020-01-06 RX ORDER — ONDANSETRON 2 MG/ML
4 INJECTION INTRAMUSCULAR; INTRAVENOUS ONCE
Status: COMPLETED | OUTPATIENT
Start: 2020-01-06 | End: 2020-01-06

## 2020-01-06 RX ORDER — ONDANSETRON 4 MG/1
4 TABLET, ORALLY DISINTEGRATING ORAL EVERY 8 HOURS PRN
Qty: 12 TABLET | Refills: 0 | Status: SHIPPED | OUTPATIENT
Start: 2020-01-06 | End: 2020-01-28 | Stop reason: ALTCHOICE

## 2020-01-06 RX ORDER — 0.9 % SODIUM CHLORIDE 0.9 %
1000 INTRAVENOUS SOLUTION INTRAVENOUS ONCE
Status: COMPLETED | OUTPATIENT
Start: 2020-01-06 | End: 2020-01-06

## 2020-01-06 RX ORDER — 0.9 % SODIUM CHLORIDE 0.9 %
500 INTRAVENOUS SOLUTION INTRAVENOUS ONCE
Status: COMPLETED | OUTPATIENT
Start: 2020-01-06 | End: 2020-01-06

## 2020-01-06 RX ORDER — CEPHALEXIN 500 MG/1
500 CAPSULE ORAL ONCE
Status: COMPLETED | OUTPATIENT
Start: 2020-01-06 | End: 2020-01-06

## 2020-01-06 RX ADMIN — SODIUM CHLORIDE 500 ML: 9 INJECTION, SOLUTION INTRAVENOUS at 21:14

## 2020-01-06 RX ADMIN — ONDANSETRON 4 MG: 2 INJECTION INTRAMUSCULAR; INTRAVENOUS at 19:56

## 2020-01-06 RX ADMIN — SODIUM CHLORIDE 1000 ML: 9 INJECTION, SOLUTION INTRAVENOUS at 19:56

## 2020-01-06 RX ADMIN — CEPHALEXIN 500 MG: 500 CAPSULE ORAL at 21:14

## 2020-01-06 ASSESSMENT — ENCOUNTER SYMPTOMS
EYE DISCHARGE: 0
WHEEZING: 0
RHINORRHEA: 0
EYE REDNESS: 0
CONSTIPATION: 0
CHEST TIGHTNESS: 0
COUGH: 0
VOMITING: 1
DIARRHEA: 0
BLOOD IN STOOL: 0
ABDOMINAL PAIN: 0
SORE THROAT: 0
BACK PAIN: 0
SHORTNESS OF BREATH: 0
NAUSEA: 1

## 2020-01-06 NOTE — LETTER
[facility]  [address] [place] [address]  Phone: [phone]  Fax: [phone]               January 6, 2020    Patient: Nilson Cherry   YOB: 2000   Date of Visit: 1/6/2020       To Whom It May Concern:    Santo Granger was seen and treated in our emergency department on 1/6/2020. She may return to work on 1/8/2020.       Sincerely,       Tim Montano RN         Signature:__________________________________

## 2020-01-07 NOTE — ED PROVIDER NOTES
[facility] ED  eMERGENCY dEPARTMENT eNCOUnter      Pt Name: Ulysses Weems  MRN: [id number]  [place] 2000  Date of evaluation: 1/6/2020  Provider: Dane Jones Dr     Chief Complaint   Patient presents with    Emesis     Onset yesterday, several episodes. HISTORY OF PRESENT ILLNESS   (Location/Symptom, Timing/Onset, Context/Setting,Quality, Duration, Modifying Factors, Severity)  Note limiting factors. Ulysses Weems is a16 y.o. female who presents to the emergency department with complaints of multiple episodes of nausea and vomiting over the past couple of days. She denies any abdominal pain. She denies any bloody emesis. She denies any diarrhea. She reports some slight discomfort when she is urinating but denies any hematuria. She denies any vaginal bleeding. She otherwise reports no other complaints at this time. She denies chest pain or shortness of breath. [facility]    Nursing Notes werereviewed. REVIEW OF SYSTEMS    (2-9 systems for level 4, 10 or more for level 5)     Review of Systems   Constitutional: Negative for chills, diaphoresis and fever. HENT: Negative for congestion, ear pain, rhinorrhea and sore throat. Eyes: Negative for discharge, redness and visual disturbance. Respiratory: Negative for cough, chest tightness, shortness of breath and wheezing. Cardiovascular: Negative for chest pain and palpitations. Gastrointestinal: Positive for nausea and vomiting. Negative for abdominal pain, blood in stool, constipation and diarrhea. Endocrine: Negative for polydipsia, polyphagia and polyuria. Genitourinary: Negative for decreased urine volume, difficulty urinating, dysuria, frequency and hematuria. Musculoskeletal: Negative for arthralgias, back pain and myalgias. Skin: Negative for pallor and rash. Allergic/Immunologic: Negative for food allergies and immunocompromised state.    Neurological: Negative for dizziness, syncope, weakness and light-headedness. Hematological: Negative for adenopathy. Does not bruise/bleed easily. Psychiatric/Behavioral: Negative for behavioral problems and suicidal ideas. The patient is not nervous/anxious. Except as noted above the remainder of the review of systems was reviewed and negative. PAST MEDICAL HISTORY     Past Medical History:   Diagnosis Date    Asthma     pt has albuterol inhaler    Colitis     Colitis     Depression     2011, on meds    Dysmenorrhea in the adolescent     Facial abscess     was cyst left cheek    Rectal bleed     Vertigo          SURGICALHISTORY       Past Surgical History:   Procedure Laterality Date    ABSCESS DRAINAGE Left 2011    once left cheek    COLONOSCOPY      COLONOSCOPY  12/05/2013        UPPER GASTROINTESTINAL ENDOSCOPY  12/05/2013             CURRENT MEDICATIONS       Previous Medications    ALBUTEROL SULFATE HFA IN    Inhale 2 puffs into the lungs as needed    CLOTRIMAZOLE-BETAMETHASONE (LOTRISONE) 1-0.05 % CREAM    Apply topically 2 times daily.     FLUCONAZOLE (DIFLUCAN) 150 MG TABLET    One daily for three days    MEDROXYPROGESTERONE (DEPO-PROVERA) 150 MG/ML INJECTION    Inject 150 mg into the muscle every 3 months    NORETHINDRONE-ETHINYL ESTRADIOL-IRON (LOESTRIN FE 1.5/30) 1.5-30 MG-MCG TABLET    Take 1 tablet by mouth daily       ALLERGIES     Soap and Silver    FAMILY HISTORY       Family History   Problem Relation Age of Onset    Allergies Mother     Crohn's Disease Mother     High Cholesterol Father     Migraines Father     Stroke Father     Heart Disease Father     Cancer Father     Diabetes Father     Cancer Maternal Grandmother     Cancer Maternal Grandfather     Migraines Brother     Cancer Paternal Grandmother           SOCIAL HISTORY       Social History     Socioeconomic History    Marital status: Single     Spouse name: None    Number of children: None    Years of education: findings:      Interpretationper the Radiologist below, if available at the time of this note:    No orders to display         ED BEDSIDE ULTRASOUND:   Performed by ED Physician - none    LABS:  Labs Reviewed   URINE RT REFLEX TO CULTURE - Abnormal; Notable for the following components:       Result Value    Nitrite, Urine POSITIVE (*)     Leukocyte Esterase, Urine SMALL (*)     All other components within normal limits   PREGNANCY, URINE - Abnormal; Notable for the following components:    HCG(Urine) Pregnancy Test POSITIVE (*)     All other components within normal limits   CBC WITH AUTO DIFFERENTIAL - Abnormal; Notable for the following components:    WBC 12.0 (*)     MCV 82.4 (*)     RDW 15.7 (*)     Seg Neutrophils 78 (*)     Lymphocytes 14 (*)     Segs Absolute 9.30 (*)     All other components within normal limits   COMPREHENSIVE METABOLIC PANEL - Abnormal; Notable for the following components:    BUN 4 (*)     Bun/Cre Ratio 7 (*)     Sodium 133 (*)     Potassium 3.5 (*)     Alkaline Phosphatase 108 (*)     All other components within normal limits   MICROSCOPIC URINALYSIS - Abnormal; Notable for the following components:    Bacteria, UA 4+ (*)     All other components within normal limits   URINE CULTURE       All other labs were within normal range or not returned as of this dictation. EMERGENCY DEPARTMENT COURSE and DIFFERENTIAL DIAGNOSIS/MDM:   Vitals:    Vitals:    01/06/20 1833   BP: (!) 116/55   Pulse: 104   Resp: 14   Temp: 97.2 °F (36.2 °C)   SpO2: 100%   Weight: 126 lb (57.2 kg)         MDM  26-year-old female who present secondary to nausea and vomiting. She reports that she has no abdominal discomfort. On exam she is well-appearing. She is not tachycardic. Abdomen is soft and nontender. Initial urine and urine pregnancy was ordered in triage which is positive for pregnancy. She has no abdominal pain no vaginal bleeding. At this point will hydrate patient reevaluate.   Rule out acute infection dehydration electrolyte balance    On reevaluation patient is feeling much better she tolerating p.o. This point we will treat her with Keflex for her urinary tract infection. And she will be given Zofran as she is taken that throughout her pregnancies and it works for her nausea and vomiting. She will be discharged home with plan to call her OB/GYN tomorrow to arrange follow-up. She will otherwise return the ER with any new or worsening complaints. Patient prespinal questions answered. Procedures    FINAL IMPRESSION      1. Non-intractable vomiting with nausea, unspecified vomiting type    2. Positive pregnancy test    3.  Acute urinary tract infection        DISPOSITION/PLAN   DISPOSITION Discharge - Pending Orders Complete 01/06/2020 08:44:17 PM      PATIENT REFERRED TO:  [facility] ED  [address] [facility] [address] [facility]  [phone]    If symptoms worsen, As needed    Anamaria [address]  [phone]            DISCHARGE MEDICATIONS:  New Prescriptions    CEPHALEXIN (KEFLEX) 500 MG CAPSULE    Take 1 capsule by mouth 2 times daily for 7 days    ONDANSETRON (ZOFRAN ODT) 4 MG DISINTEGRATING TABLET    Take 1 tablet by mouth every 8 hours as needed for Nausea or Vomiting              Summation      Patient Course:      ED Medications administered this visit:    Medications   0.9 % sodium chloride bolus (1,000 mLs Intravenous New Bag 1/6/20 1956)   cephALEXin (KEFLEX) capsule 500 mg (has no administration in time range)   ondansetron (ZOFRAN) injection 4 mg (4 mg Intravenous Given 1/6/20 1956)       New Prescriptions from this visit:    New Prescriptions    CEPHALEXIN (KEFLEX) 500 MG CAPSULE    Take 1 capsule by mouth 2 times daily for 7 days    ONDANSETRON (ZOFRAN ODT) 4 MG DISINTEGRATING TABLET    Take 1 tablet by mouth every 8 hours as needed for Nausea or Vomiting       Follow-up:  [facility] ED  [address] [facility].

## 2020-01-09 LAB
CULTURE: ABNORMAL
Lab: ABNORMAL
SPECIMEN DESCRIPTION: ABNORMAL

## 2020-01-14 ENCOUNTER — HOSPITAL ENCOUNTER (OUTPATIENT)
Age: 20
Setting detail: SPECIMEN
Discharge: HOME | End: 2020-01-14
Payer: COMMERCIAL

## 2020-01-14 ENCOUNTER — INITIAL PRENATAL (OUTPATIENT)
Dept: OBGYN | Age: 20
End: 2020-01-14
Payer: COMMERCIAL

## 2020-01-14 VITALS
DIASTOLIC BLOOD PRESSURE: 60 MMHG | WEIGHT: 168.8 LBS | HEART RATE: 86 BPM | SYSTOLIC BLOOD PRESSURE: 94 MMHG | BODY MASS INDEX: 31.89 KG/M2

## 2020-01-14 PROBLEM — S90.121D: Status: ACTIVE | Noted: 2019-04-14
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
## 2020-01-14 LAB
AMPHETAMINE SCREEN URINE: NEGATIVE
BARBITURATE SCREEN URINE: NEGATIVE
BENZODIAZEPINE SCREEN, URINE: NEGATIVE
BUPRENORPHINE URINE: NEGATIVE
CANNABINOID SCREEN URINE: NEGATIVE
COCAINE METABOLITE, URINE: NEGATIVE
MDMA URINE: NORMAL
METHADONE SCREEN, URINE: NEGATIVE
METHAMPHETAMINE, URINE: NEGATIVE
OPIATES, URINE: NEGATIVE
OXYCODONE SCREEN URINE: NEGATIVE
PHENCYCLIDINE, URINE: NEGATIVE
PROPOXYPHENE, URINE: NEGATIVE
TEST INFORMATION: NORMAL
TRICYCLIC ANTIDEPRESSANTS, UR: NEGATIVE

## 2020-01-14 PROCEDURE — 87491 CHLMYD TRACH DNA AMP PROBE: CPT

## 2020-01-14 PROCEDURE — 99211 OFF/OP EST MAY X REQ PHY/QHP: CPT | Performed by: ADVANCED PRACTICE MIDWIFE

## 2020-01-14 PROCEDURE — 87086 URINE CULTURE/COLONY COUNT: CPT

## 2020-01-14 PROCEDURE — 80306 DRUG TEST PRSMV INSTRMNT: CPT

## 2020-01-14 PROCEDURE — 99212 OFFICE O/P EST SF 10 MIN: CPT | Performed by: ADVANCED PRACTICE MIDWIFE

## 2020-01-14 PROCEDURE — 87591 N.GONORRHOEAE DNA AMP PROB: CPT

## 2020-01-14 NOTE — PROGRESS NOTES
reviewed. Educational materials given and genetic testing reviewed. Breastfeeding handouts given and reviewed: Yes     If BMI over 35 was HgA1C drawn: N/A      If history of thyroid problem was TSH drawn: N/A       My chart set up and activated: Yes    If history of preeclampsia did we start baby ASA: N/A    Education:  Patient Instructions     Patient Education        Learning About Pregnancy  Your Care Instructions    Your health in the early weeks of your pregnancy is particularly important for your baby's health. Take good care of yourself. Anything you do that harms your body can also harm your baby. Make sure to go to all of your doctor appointments. Regular checkups will help keep you and your baby healthy. How can you care for yourself at home? Diet    · Eat a balanced diet. Make sure your diet includes plenty of beans, peas, and leafy green vegetables.     · Do not skip meals or go for many hours without eating. If you are nauseated, try to eat a small, healthy snack every 2 to 3 hours.     · Do not eat fish that has a high level of mercury, such as shark, swordfish, or mackerel. Do not eat more than one can of tuna each week.     · Drink plenty of fluids, enough so that your urine is light yellow or clear like water. If you have kidney, heart, or liver disease and have to limit fluids, talk with your doctor before you increase the amount of fluids you drink.     · Cut down on caffeine, such as coffee, tea, and cola.     · Do not drink alcohol, such as beer, wine, or hard liquor.     · Take a multivitamin that contains at least 400 micrograms (mcg) of folic acid to help prevent birth defects. Fortified cereal and whole wheat bread are good additional sources of folic acid.     · Increase the calcium in your diet. Try to drink a quart of skim milk each day.  You may also take calcium supplements and choose foods such as cheese and yogurt.    Lifestyle    · Make sure you go to your follow-up appointments.     · Get plenty of rest. You may be unusually tired while you are pregnant.     · Get at least 30 minutes of exercise on most days of the week. Walking is a good choice. If you have not exercised in the past, start out slowly. Take several short walks each day.     · Do not smoke. If you need help quitting, talk to your doctor about stop-smoking programs. These can increase your chances of quitting for good.     · Do not touch cat feces or litter boxes. Also, wash your hands after you handle raw meat, and fully cook all meat before you eat it. Wear gloves when you work in the yard or garden, and wash your hands well when you are done. Cat feces, raw or undercooked meat, and contaminated dirt can cause an infection that may harm your baby or lead to a miscarriage.     · Do not use saunas or hot tubs. Raising your body temperature may harm your baby.     · Avoid chemical fumes, paint fumes, or poisons.     · Do not use illegal drugs or alcohol. Medicines    · Review all of your medicines with your doctor. Some of your routine medicines may need to be changed to protect your baby.     · Use acetaminophen (Tylenol) to relieve minor problems, such as a mild headache or backache or a mild fever with cold symptoms. Do not use nonsteroidal anti-inflammatory drugs (NSAIDs), such as ibuprofen (Advil, Motrin) or naproxen (Aleve), unless your doctor says it is okay.     · Do not take two or more pain medicines at the same time unless the doctor told you to. Many pain medicines have acetaminophen, which is Tylenol. Too much acetaminophen (Tylenol) can be harmful.     · Take your medicines exactly as prescribed. Call your doctor if you think you are having a problem with your medicine.    To manage morning sickness    · If you feel sick when you first wake up, try eating a small snack (such as crackers) before you get out of bed.  Allow some time to digest the snack, and then get out of bed slowly.     · Do not your symptoms. Your doctor can tell you the doses that are safe for you. · Take your prenatal vitamins at night on a full stomach. When should you call for help? Call 911 anytime you think you may need emergency care. For example, call if:    · You passed out (lost consciousness).    Call your doctor now or seek immediate medical care if:    · You are sick to your stomach or cannot drink fluids.     · You have symptoms of dehydration, such as:  ? Dry eyes and a dry mouth. ? Passing only a little urine. ? Feeling thirstier than usual.     · You are not able to keep down your medicine.     · You have pain in your belly or pelvis.    Watch closely for changes in your health, and be sure to contact your doctor if:    · You do not get better as expected. Where can you learn more? Go to https://chpepiceweb.Redeem. org and sign in to your Poetica account. Enter L314 in the EnOcean box to learn more about \"Managing Morning Sickness: Care Instructions. \"     If you do not have an account, please click on the \"Sign Up Now\" link. Current as of: May 29, 2019  Content Version: 12.3  © 2209-7256 Healthwise, Incorporated. Care instructions adapted under license by [facility] [place] ([facility]). If you have questions about a medical condition or this instruction, always ask your healthcare professional. [address] any warranty or liability for your use of this information. Patient Education        Nutrition During Pregnancy: Care Instructions  Your Care Instructions    Healthy eating when you are pregnant is important for you and your baby. It can help you feel well and have a successful pregnancy and delivery. During pregnancy your nutrition needs increase. Even if you have excellent eating habits, your doctor may recommend a multivitamin to make sure you get enough iron and folic acid. Many pregnant women wonder how much weight they should gain.  In general, women who were at a healthy weight before they became pregnant should gain between 25 and 35 pounds. Women who were overweight before pregnancy are usually advised to gain 15 to 25 pounds. Women who were underweight before pregnancy are usually advised to gain 28 to 40 pounds. Your doctor will work with you to set a weight goal that is right for you. Gaining a healthy amount of weight helps you have a healthy baby. Follow-up care is a key part of your treatment and safety. Be sure to make and go to all appointments, and call your doctor if you are having problems. It's also a good idea to know your test results and keep a list of the medicines you take. How can you care for yourself at home? · Eat plenty of fruits and vegetables. Include a variety of orange, yellow, and leafy dark-green vegetables every day. · Choose whole-grain bread, cereal, and pasta. Good choices include whole wheat bread, whole wheat pasta, brown rice, and oatmeal.  · Get 4 or more servings of milk and milk products each day. Good choices include nonfat or low-fat milk, yogurt, and cheese. If you cannot eat milk products, you can get calcium from calcium-fortified products such as orange juice, soy milk, and tofu. Other non-milk sources of calcium include leafy green vegetables, such as broccoli, kale, mustard greens, turnip greens, bok ananth, and brussels sprouts. · If you eat meat, pick lower-fat types. Good choices include lean cuts of meat and chicken or turkey without the skin. · Do not eat shark, swordfish, hebert mackerel, or tilefish. They have high levels of mercury, which is dangerous to your baby. You can eat up to 12 ounces a week of fish or shellfish that have low mercury levels. Good choices include shrimp, wild salmon, pollock, and catfish. Do not eat more than 6 ounces of tuna each week. · Heat lunch meats (such as turkey, ham, or bologna) to 165°F before you eat them.  This reduces your risk of getting sick from a kind of bacteria that can be found in lunch meats. · Do not eat unpasteurized soft cheeses, such as brie, feta, fresh mozzarella, and blue cheese. They have a bacteria that could harm your baby. · Limit caffeine. If you drink coffee or tea, have no more than 1 cup a day. Caffeine is also found in migdalia. · Do not drink any alcohol. No amount of alcohol has been found to be safe during pregnancy. · Do not diet or try to lose weight. For example, do not follow a low-carbohydrate diet. If you are overweight at the start of your pregnancy, your doctor will work with you to manage your weight gain. · Tell your doctor about all vitamins and supplements you take. When should you call for help? Watch closely for changes in your health, and be sure to contact your doctor if you have any problems. Where can you learn more? Go to https://Quividipepiceweb.[employer]aceuticals. org and sign in to your [place] [employer] account. Enter Y785 in the Piki box to learn more about \"Nutrition During Pregnancy: Care Instructions. \"     If you do not have an account, please click on the \"Sign Up Now\" link. Current as of: May 29, 2019  Content Version: 12.3  © 6994-3367 [employer]. Care instructions adapted under license by [facility] ([facility]). If you have questions about a medical condition or this instruction, always ask your healthcare professional. [place] [address] any warranty or liability for your use of this information. Patient Education        Learning About Pregnancy and Smoking  How does smoking affect your pregnancy? When you're pregnant, everything you put in your body can affect your baby. If you smoke, your baby is exposed to chemicals such as nicotine and carbon monoxide. Secondhand smoke also is a problem. Babies whose mothers breathe other people's tobacco smoke during pregnancy are more likely to have health problems. Smoking during pregnancy increases the chance of:  · Placenta problems.  (The placenta is an organ that gives the baby oxygen and nutrients from the mother and gets rid of waste.)  ·  birth. The baby is born too soon. · Miscarriage or stillbirth. · Birth defects, such as a cleft lip. · Death early in life, mainly because of sudden infant death syndrome (SIDS). · A baby with a low birth weight. Babies with low weight at birth can have more health problems than those born at normal weight. Some of the problems can be serious. A baby with a low birth weight may have a greater chance for problems in adulthood, such as high blood pressure, diabetes, and heart disease. How does your being smoke-free help your baby? When you're smoke-free, you give your baby the oxygen he or she needs for a healthy start. And you protect your baby from the chemicals in tobacco smoke. If you quit smoking before you become pregnant, your risk of having a baby with a low birth weight is the same as that of a woman who does not smoke. The same is true if you quit during the first 3 months of your pregnancy. It's never too late to quit smoking. Women who quit later in pregnancy still reduce the risk of problems for their babies. It's also important to stay smoke-free after your baby is born. Ask others to not smoke around your baby. Protecting your baby from tobacco smoke will reduce your baby's chance of having breathing problems. How do you quit? When you're ready to go smoke-free, use these tips to help you quit:  Get ready  · If you're not pregnant, choose a quit date that works for you. If you're pregnant, it's best if you can stop smoking right away. If you're pregnant and can't stop yet, try to cut down as much as you can. · Talk to your doctor about a program to help you quit. Ask your doctor about nicotine replacement or other medicine. · Get rid of your cigarettes, ashtrays, and lighters. Clean your house and clothes to get rid of the smoke smell. · If you live with someone who smokes, discuss quitting together.  If this is not an option, talk to that person about not smoking around you. Make a plan for quitting  · Find ways to avoid places where others are smoking. · Think about when it might be hardest to not smoke, such as when you are restless or around others who smoke. Plan how you will handle your cravings during these times. · Change your routine. Avoid those things that make you reach for a cigarette. · Plan ways to cope with withdrawal from smoking. For example, take a walk after dinner instead of having a cigarette. Think of ways to cut down on stress in the first few weeks of quitting. · Keep trying to quit if you start smoking again. Most people who smoke will quit and restart many times before they stop for good. Each time you start smoking again, think about why you went back to smoking and why it's important to quit. · Remind yourself often about why you want to quit, and that it will get easier with time. Get support  · Ask loved ones or people who used to smoke for support and tips. · Get counseling. People who use telephone, group, or one-on-one counseling are much more likely to stop smoking. · Join a support group for people who smoke. · Call 1-800-QUIT-NOW for information and support. Follow-up care is a key part of your treatment and safety. Be sure to make and go to all appointments, and call your doctor if you are having problems. It's also a good idea to know your test results and keep a list of the medicines you take. Where can you learn more? Go to https://magdalene.healthHoosier Hot [email or web address] org and sign in to your Ecovision account. Enter O522 in the [facility] box to learn more about \"Learning About Pregnancy and Smoking. \"     If you do not have an account, please click on the \"Sign Up Now\" link. Current as of: May 29, 2019  Content Version: 12.3  © 0029-4251 Healthwise, Incorporated. Care instructions adapted under license by [facility] [place] ([facility]).  If you have questions about a medical condition or this instruction, always ask your healthcare professional. [address] any warranty or liability for your use of this information. Patient Education        Weeks 6 to 10 of Your Pregnancy: Care Instructions  Your Care Instructions    Congratulations on your pregnancy. This is an exciting and important time for you. During the first 6 to 10 weeks of your pregnancy, your body goes through many changes. Your baby grows very fast, even though you cannot feel it yet. You may start to notice that you feel different, both in your body and your emotions. Because each woman's pregnancy is unique, there is no right way to feel. You may feel the healthiest you have ever been, or you may feel tired or sick to your stomach (\"morning sickness\"). These early weeks are a time to make healthy choices and to eat the best foods for you and your baby. This care sheet will give you some ideas. This is also a good time to think about birth defects testing. These are tests done during pregnancy to look for possible problems with the baby. First trimester tests for birth defects can be done between 8 and 17 weeks of pregnancy, depending on the test. Talk with your doctor about what kinds of tests are available. Follow-up care is a key part of your treatment and safety. Be sure to make and go to all appointments, and call your doctor if you are having problems. It's also a good idea to know your test results and keep a list of the medicines you take. How can you care for yourself at home? Eat well  · Eat at least 3 meals and 2 healthy snacks every day. Eat fresh, whole foods, including:  ? 7 or more servings of bread, tortillas, cereal, rice, pasta, or oatmeal.  ? 3 or more servings of vegetables, especially leafy green vegetables. ? 2 or more servings of fruits. ? 3 or more servings of milk, yogurt, or cheese. ? 2 or more servings of meat, turkey, chicken, fish, eggs, or dried beans.   · Drink plenty of fluids, especially water. Avoid sodas and other sweetened drinks. · Choose foods that have important vitamins for your baby, such as calcium, iron, and folate. ? Dairy products, tofu, canned fish with bones, almonds, broccoli, dark leafy greens, corn tortillas, and fortified orange juice are good sources of calcium. ? Beef, poultry, liver, spinach, lentils, dried beans, fortified cereals, and dried fruits are rich in iron. ? Dark leafy greens, broccoli, asparagus, liver, fortified cereals, orange juice, peanuts, and almonds are good sources of folate. · Avoid foods that could harm your baby. ? Do not eat raw or undercooked meat, chicken, or fish (such as sushi or raw oysters). ? Do not eat raw eggs or foods that contain raw eggs, such as Caesar dressing. ? Do not eat soft cheeses and unpasteurized dairy foods, such as Brie, feta, or blue cheese. ? Do not eat fish that contains a lot of mercury, such as shark, swordfish, tilefish, or hebert mackerel. Do not eat more than 6 ounces of tuna each week. ? Do not eat raw sprouts, especially alfalfa sprouts. ? Cut down on caffeine, such as coffee, tea, and cola. Protect yourself and your baby  · Do not touch vandana litter or cat feces. They can cause an infection that could harm your baby. · High body temperature can be harmful to your baby. So if you want to use a sauna or hot tub, be sure to talk to your doctor about how to use it safely. [place] with morning sickness  · Sip small amounts of water, juices, or shakes. Try drinking between meals, not with meals. · Eat 5 or 6 small meals a day. Try dry toast or crackers when you first get up, and eat breakfast a little later. · Avoid spicy, greasy, and fatty foods. · When you feel sick, open your windows or go for a short walk to get fresh air. · Try nausea wristbands. These help some women. · Tell your doctor if you think your prenatal vitamins make you sick. Where can you learn more?   Go to https://chpepiceweb.[email or web address] Software [email or web address] org and sign in to your [employer] account. Enter G112 in the [place] box to learn more about \"Weeks 6 to 10 of Your Pregnancy: Care Instructions. \"     If you do not have an account, please click on the \"Sign Up Now\" link. Current as of: May 29, 2019  Content Version: 12.3  © 1999-4215 [employer]. Care instructions adapted under license by [facility] ([facility]). If you have questions about a medical condition or this instruction, always ask your healthcare professional. Avnistanislav Shama disclaims any Patient Education        Learning About When to Call Your Doctor During Pregnancy (Up to 20 Weeks)  Your Care Instructions    It's common to have concerns about what might be a problem during pregnancy. Although most pregnant women don't have any serious problems, it's important to know when to call your doctor if you have certain symptoms. These are general suggestions. Your doctor may give you some more information about when to call. When to call your doctor (up to 20 weeks)  Call 911 anytime you think you may need emergency care. For example, call if:  · You passed out (lost consciousness). Call your doctor now or seek immediate medical care if:  · You have a fever. · You have vaginal bleeding. · You are dizzy or lightheaded, or you feel like you may faint. · You have symptoms of a urinary tract infection. These may include:  ? Pain or burning when you urinate. ? A frequent need to urinate without being able to pass much urine. ? Pain in the flank, which is just below the rib cage and above the waist on either side of the back. ? Blood in your urine. · You have belly pain. · You think you are having contractions. · You have a sudden release of fluid from your vagina. Watch closely for changes in your health, and be sure to contact your doctor if:  · You have vaginal discharge that smells bad.   · You have other concerns about your pregnancy. Follow-up care is a key part of your treatment and safety. Be sure to make and go to all appointments, and call your doctor if you are having problems. It's also a good idea to know your test results and keep a list of the medicines you take. Where can you learn more? Go to https://chpepiceweb.[email or web address] org and sign in to your Inuvo account. Enter K501 in the [facility] box to learn more about \"Learning About When to Call Your Doctor During Pregnancy (Up to 20 Weeks). \"     If you do not have an account, please click on the \"Sign Up Now\" link. Current as of: May 29, 2019  Content Version: 12.3  © 2922-9309 Healthwise, [employer]. Care instructions adapted under license by [facility] ([facility]). If you have questions about a medical condition or this instruction, always ask your healthcare professional. [place] [address] any warranty or liability for your use of this information. warranty or liability for your use of this information. Assessment:      Diagnosis Orders   1. Amenorrhea  C.trachomatis N.gonorrhoeae DNA, Urine    Hepatitis C Antibody    HIV Screen    PRENATAL PROFILE I    Prenatal type and screen    Urine Culture    Urine Drug Screen, Comprehensive   2. Positive urine pregnancy test  C.trachomatis N.gonorrhoeae DNA, Urine    Hepatitis C Antibody    HIV Screen    PRENATAL PROFILE I    Prenatal type and screen    Urine Culture    Urine Drug Screen, Comprehensive   3.  Supervision of other normal pregnancy, antepartum  C.trachomatis N.gonorrhoeae DNA, Urine    Hepatitis C Antibody    HIV Screen    PRENATAL PROFILE I    Prenatal type and screen    Urine Culture    Urine Drug Screen, Comprehensive       Plan: Order Routine Prenatal Lab Yes, will have drawn at lab     Order additional testing if requested         Order Prenatal Vitamins   No: OTC              Appointment with Modesto Menchaca CNM in 1 week for Dating U/S and total

## 2020-01-14 NOTE — PATIENT INSTRUCTIONS
programs. These can increase your chances of quitting for good.     · Do not touch cat feces or litter boxes. Also, wash your hands after you handle raw meat, and fully cook all meat before you eat it. Wear gloves when you work in the yard or garden, and wash your hands well when you are done. Cat feces, raw or undercooked meat, and contaminated dirt can cause an infection that may harm your baby or lead to a miscarriage.     · Do not use saunas or hot tubs. Raising your body temperature may harm your baby.     · Avoid chemical fumes, paint fumes, or poisons.     · Do not use illegal drugs or alcohol. Medicines    · Review all of your medicines with your doctor. Some of your routine medicines may need to be changed to protect your baby.     · Use acetaminophen (Tylenol) to relieve minor problems, such as a mild headache or backache or a mild fever with cold symptoms. Do not use nonsteroidal anti-inflammatory drugs (NSAIDs), such as ibuprofen (Advil, Motrin) or naproxen (Aleve), unless your doctor says it is okay.     · Do not take two or more pain medicines at the same time unless the doctor told you to. Many pain medicines have acetaminophen, which is Tylenol. Too much acetaminophen (Tylenol) can be harmful.     · Take your medicines exactly as prescribed. Call your doctor if you think you are having a problem with your medicine.    To manage morning sickness    · If you feel sick when you first wake up, try eating a small snack (such as crackers) before you get out of bed. Allow some time to digest the snack, and then get out of bed slowly.     · Do not skip meals or go for long periods without eating. An empty stomach can make nausea worse.     · Eat small, frequent meals instead of three large meals each day.     · Drink plenty of fluids.  Sports drinks, such as Gatorade or Powerade, are good choices.     · Eat foods that are high in protein but low in fat.     · If you are taking iron supplements, ask your doctor if they are necessary. Iron can make nausea worse.     · Avoid any smells, such as coffee, that make you feel sick.     · Get lots of rest. Morning sickness may be worse when you are tired. Follow-up care is a key part of your treatment and safety. Be sure to make and go to all appointments, and call your doctor if you are having problems. It's also a good idea to know your test results and keep a list of the medicines you take. Where can you learn more? Go to https://Nixlepejustoeb.[employer]. org and sign in to your Goodpatch account. Enter J797 in the Trampoline Systems box to learn more about \"Learning About Pregnancy. \"     If you do not have an account, please click on the \"Sign Up Now\" link. Current as of: May 29, 2019  Content Version: 12.3  © 7737-8384 Healthwise, [employer]. Care instructions adapted under license by [facility] [place] ([facility]). If you have questions about a medical condition or this instruction, always ask your healthcare professional. [address] any warranty or liability for your use of this information. Patient Education        Managing Morning Sickness: Care Instructions  Your Care Instructions    For many women, the toughest part of early pregnancy is morning sickness. Morning sickness can range from mild nausea to severe nausea with bouts of vomiting. Symptoms may be worse in the morning, although they can strike at any time of the day or night. If you have nausea, vomiting, or both, look for safe measures that can bring you relief. You can take simple steps at home to manage morning sickness. These steps include changing what and when you eat and avoiding certain foods and smells. Some women find that acupuncture and acupressure wristbands also help. Follow-up care is a key part of your treatment and safety. Be sure to make and go to all appointments, and call your doctor if you are having problems.  It's also a good idea to know your test results and to your stomach or cannot drink fluids.     · You have symptoms of dehydration, such as:  ? Dry eyes and a dry mouth. ? Passing only a little urine. ? Feeling thirstier than usual.     · You are not able to keep down your medicine.     · You have pain in your belly or pelvis.    Watch closely for changes in your health, and be sure to contact your doctor if:    · You do not get better as expected. Where can you learn more? Go to https://TriActivepepicCorsa Technologyeb.Anbado Video. org and sign in to your Flickr account. Enter U624 in the [employer] box to learn more about \"Managing Morning Sickness: Care Instructions. \"     If you do not have an account, please click on the \"Sign Up Now\" link. Current as of: May 29, 2019  Content Version: 12.3  © 9157-5232 Healthwise, Incorporated. Care instructions adapted under license by [facility] ([facility]). If you have questions about a medical condition or this instruction, always ask your healthcare professional. [place] [address] any warranty or liability for your use of this information. Patient Education        Nutrition During Pregnancy: Care Instructions  Your Care Instructions    Healthy eating when you are pregnant is important for you and your baby. It can help you feel well and have a successful pregnancy and delivery. During pregnancy your nutrition needs increase. Even if you have excellent eating habits, your doctor may recommend a multivitamin to make sure you get enough iron and folic acid. Many pregnant women wonder how much weight they should gain. In general, women who were at a healthy weight before they became pregnant should gain between 25 and 35 pounds. Women who were overweight before pregnancy are usually advised to gain 15 to 25 pounds. Women who were underweight before pregnancy are usually advised to gain 28 to 40 pounds. Your doctor will work with you to set a weight goal that is right for you.  Gaining a healthy amount of weight helps you have a healthy baby. Follow-up care is a key part of your treatment and safety. Be sure to make and go to all appointments, and call your doctor if you are having problems. It's also a good idea to know your test results and keep a list of the medicines you take. How can you care for yourself at home? · Eat plenty of fruits and vegetables. Include a variety of orange, yellow, and leafy dark-green vegetables every day. · Choose whole-grain bread, cereal, and pasta. Good choices include whole wheat bread, whole wheat pasta, brown rice, and oatmeal.  · Get 4 or more servings of milk and milk products each day. Good choices include nonfat or low-fat milk, yogurt, and cheese. If you cannot eat milk products, you can get calcium from calcium-fortified products such as orange juice, soy milk, and tofu. Other non-milk sources of calcium include leafy green vegetables, such as broccoli, kale, mustard greens, turnip greens, bok ananth, and brussels sprouts. · If you eat meat, pick lower-fat types. Good choices include lean cuts of meat and chicken or turkey without the skin. · Do not eat shark, swordfish, hebert mackerel, or tilefish. They have high levels of mercury, which is dangerous to your baby. You can eat up to 12 ounces a week of fish or shellfish that have low mercury levels. Good choices include shrimp, wild salmon, pollock, and catfish. Do not eat more than 6 ounces of tuna each week. · Heat lunch meats (such as turkey, ham, or bologna) to 165°F before you eat them. This reduces your risk of getting sick from a kind of bacteria that can be found in lunch meats. · Do not eat unpasteurized soft cheeses, such as brie, feta, fresh mozzarella, and blue cheese. They have a bacteria that could harm your baby. · Limit caffeine. If you drink coffee or tea, have no more than 1 cup a day. Caffeine is also found in migdalia. · Do not drink any alcohol.  No amount of alcohol has been found to be safe during health problems than those born at normal weight. Some of the problems can be serious. A baby with a low birth weight may have a greater chance for problems in adulthood, such as high blood pressure, diabetes, and heart disease. How does your being smoke-free help your baby? When you're smoke-free, you give your baby the oxygen he or she needs for a healthy start. And you protect your baby from the chemicals in tobacco smoke. If you quit smoking before you become pregnant, your risk of having a baby with a low birth weight is the same as that of a woman who does not smoke. The same is true if you quit during the first 3 months of your pregnancy. It's never too late to quit smoking. Women who quit later in pregnancy still reduce the risk of problems for their babies. It's also important to stay smoke-free after your baby is born. Ask others to not smoke around your baby. Protecting your baby from tobacco smoke will reduce your baby's chance of having breathing problems. How do you quit? When you're ready to go smoke-free, use these tips to help you quit:  Get ready  · If you're not pregnant, choose a quit date that works for you. If you're pregnant, it's best if you can stop smoking right away. If you're pregnant and can't stop yet, try to cut down as much as you can. · Talk to your doctor about a program to help you quit. Ask your doctor about nicotine replacement or other medicine. · Get rid of your cigarettes, ashtrays, and lighters. Clean your house and clothes to get rid of the smoke smell. · If you live with someone who smokes, discuss quitting together. If this is not an option, talk to that person about not smoking around you. Make a plan for quitting  · Find ways to avoid places where others are smoking. · Think about when it might be hardest to not smoke, such as when you are restless or around others who smoke. Plan how you will handle your cravings during these times. · Change your routine. you.  During the first 6 to 10 weeks of your pregnancy, your body goes through many changes. Your baby grows very fast, even though you cannot feel it yet. You may start to notice that you feel different, both in your body and your emotions. Because each woman's pregnancy is unique, there is no right way to feel. You may feel the healthiest you have ever been, or you may feel tired or sick to your stomach (\"morning sickness\"). These early weeks are a time to make healthy choices and to eat the best foods for you and your baby. This care sheet will give you some ideas. This is also a good time to think about birth defects testing. These are tests done during pregnancy to look for possible problems with the baby. First trimester tests for birth defects can be done between 8 and 17 weeks of pregnancy, depending on the test. Talk with your doctor about what kinds of tests are available. Follow-up care is a key part of your treatment and safety. Be sure to make and go to all appointments, and call your doctor if you are having problems. It's also a good idea to know your test results and keep a list of the medicines you take. How can you care for yourself at home? Eat well  · Eat at least 3 meals and 2 healthy snacks every day. Eat fresh, whole foods, including:  ? 7 or more servings of bread, tortillas, cereal, rice, pasta, or oatmeal.  ? 3 or more servings of vegetables, especially leafy green vegetables. ? 2 or more servings of fruits. ? 3 or more servings of milk, yogurt, or cheese. ? 2 or more servings of meat, turkey, chicken, fish, eggs, or dried beans. · Drink plenty of fluids, especially water. Avoid sodas and other sweetened drinks. · Choose foods that have important vitamins for your baby, such as calcium, iron, and folate. ? Dairy products, tofu, canned fish with bones, almonds, broccoli, dark leafy greens, corn tortillas, and fortified orange juice are good sources of calcium. ?  Beef, poultry,

## 2020-01-15 LAB
C. TRACHOMATIS DNA ,URINE: NEGATIVE
CULTURE: NORMAL
Lab: NORMAL
N. GONORRHOEAE DNA, URINE: NEGATIVE
SPECIMEN DESCRIPTION: NORMAL
SPECIMEN DESCRIPTION: NORMAL

## 2020-01-28 ENCOUNTER — TELEPHONE (OUTPATIENT)
Dept: OBGYN | Age: 20
End: 2020-01-28

## 2020-01-28 ENCOUNTER — APPOINTMENT (OUTPATIENT)
Dept: GENERAL RADIOLOGY | Age: 20
End: 2020-01-28
Payer: COMMERCIAL

## 2020-01-28 ENCOUNTER — HOSPITAL ENCOUNTER (EMERGENCY)
Age: 20
Discharge: HOME | End: 2020-01-28
Attending: EMERGENCY MEDICINE
Payer: COMMERCIAL

## 2020-01-28 VITALS
HEART RATE: 83 BPM | DIASTOLIC BLOOD PRESSURE: 63 MMHG | OXYGEN SATURATION: 97 % | SYSTOLIC BLOOD PRESSURE: 106 MMHG | TEMPERATURE: 97.9 F | RESPIRATION RATE: 18 BRPM

## 2020-01-28 LAB
ANION GAP SERPL CALCULATED.3IONS-SCNC: 13 MMOL/L (ref 9–17)
BUN BLDV-MCNC: 8 MG/DL (ref 6–20)
BUN/CREAT BLD: 21 (ref 9–20)
CALCIUM SERPL-MCNC: 9.5 MG/DL (ref 8.6–10.4)
CHLORIDE BLD-SCNC: 101 MMOL/L (ref 98–107)
CO2: 20 MMOL/L (ref 20–31)
CREAT SERPL-MCNC: 0.38 MG/DL (ref 0.5–0.9)
D-DIMER QUANTITATIVE: 0.2 MG/L FEU (ref 0.19–0.5)
EKG ATRIAL RATE: 80 BPM
EKG P AXIS: 38 DEGREES
EKG P-R INTERVAL: 138 MS
EKG Q-T INTERVAL: 376 MS
EKG QRS DURATION: 80 MS
EKG QTC CALCULATION (BAZETT): 433 MS
EKG R AXIS: 63 DEGREES
EKG T AXIS: 34 DEGREES
EKG VENTRICULAR RATE: 80 BPM
GFR AFRICAN AMERICAN: >60 ML/MIN
GFR NON-AFRICAN AMERICAN: >60 ML/MIN
GFR SERPL CREATININE-BSD FRML MDRD: ABNORMAL ML/MIN/{1.73_M2}
GFR SERPL CREATININE-BSD FRML MDRD: ABNORMAL ML/MIN/{1.73_M2}
GLUCOSE BLD-MCNC: 95 MG/DL (ref 70–99)
HCT VFR BLD CALC: 35.8 % (ref 36.3–47.1)
HEMOGLOBIN: 11.7 G/DL (ref 11.9–15.1)
MCH RBC QN AUTO: 27 PG (ref 25.2–33.5)
MCHC RBC AUTO-ENTMCNC: 32.7 G/DL (ref 28.4–34.8)
MCV RBC AUTO: 82.5 FL (ref 82.6–102.9)
NRBC AUTOMATED: 0 PER 100 WBC
PDW BLD-RTO: 15.7 % (ref 11.8–14.4)
PLATELET # BLD: 353 K/UL (ref 138–453)
PMV BLD AUTO: 10.3 FL (ref 8.1–13.5)
POTASSIUM SERPL-SCNC: 4 MMOL/L (ref 3.7–5.3)
RBC # BLD: 4.34 M/UL (ref 3.95–5.11)
SODIUM BLD-SCNC: 134 MMOL/L (ref 135–144)
WBC # BLD: 13.7 K/UL (ref 3.5–11.3)

## 2020-01-28 PROCEDURE — 71046 X-RAY EXAM CHEST 2 VIEWS: CPT

## 2020-01-28 PROCEDURE — 80048 BASIC METABOLIC PNL TOTAL CA: CPT

## 2020-01-28 PROCEDURE — 93010 ELECTROCARDIOGRAM REPORT: CPT | Performed by: INTERNAL MEDICINE

## 2020-01-28 PROCEDURE — 2580000003 HC RX 258: Performed by: EMERGENCY MEDICINE

## 2020-01-28 PROCEDURE — 81003 URINALYSIS AUTO W/O SCOPE: CPT

## 2020-01-28 PROCEDURE — 85027 COMPLETE CBC AUTOMATED: CPT

## 2020-01-28 PROCEDURE — 93005 ELECTROCARDIOGRAM TRACING: CPT | Performed by: PHYSICIAN ASSISTANT

## 2020-01-28 PROCEDURE — 36415 COLL VENOUS BLD VENIPUNCTURE: CPT

## 2020-01-28 PROCEDURE — 99284 EMERGENCY DEPT VISIT MOD MDM: CPT

## 2020-01-28 PROCEDURE — 85379 FIBRIN DEGRADATION QUANT: CPT

## 2020-01-28 RX ORDER — 0.9 % SODIUM CHLORIDE 0.9 %
1000 INTRAVENOUS SOLUTION INTRAVENOUS ONCE
Status: COMPLETED | OUTPATIENT
Start: 2020-01-28 | End: 2020-01-28

## 2020-01-28 RX ORDER — ACETAMINOPHEN 500 MG
500 TABLET ORAL EVERY 6 HOURS PRN
Status: ON HOLD | COMMUNITY
End: 2020-08-26 | Stop reason: HOSPADM

## 2020-01-28 RX ADMIN — SODIUM CHLORIDE 1000 ML: 9 INJECTION, SOLUTION INTRAVENOUS at 15:19

## 2020-01-28 ASSESSMENT — ENCOUNTER SYMPTOMS
SORE THROAT: 0
WHEEZING: 0
SHORTNESS OF BREATH: 1
CONSTIPATION: 0
VOMITING: 0
NAUSEA: 0
DIARRHEA: 0
ABDOMINAL DISTENTION: 0
COUGH: 0
RHINORRHEA: 0

## 2020-01-28 ASSESSMENT — PAIN DESCRIPTION - PAIN TYPE: TYPE: ACUTE PAIN

## 2020-01-28 ASSESSMENT — PAIN DESCRIPTION - LOCATION: LOCATION: HEAD

## 2020-01-28 ASSESSMENT — PAIN SCALES - GENERAL: PAINLEVEL_OUTOF10: 8

## 2020-01-28 NOTE — TELEPHONE ENCOUNTER
Patient notified of Elva's advice, patient was seen in ER for evaluation however will call if symptoms worsen.

## 2020-01-28 NOTE — ED PROVIDER NOTES
MEDICATIONS:  New Prescriptions    No medications on file       Rhesa Rota  4:18 PM    Attending Emergency Physician  7 [facility]    (Please note that portions of this note were completed with a voice recognition program.  Effortswere made to edit the dictations but occasionally words are mis-transcribed.)              Yue Reynolds,   01/28/20 4755

## 2020-01-28 NOTE — ED NOTES
Ultrasound was done at [facility] office. Have attempted twice to get results. Due to this being a new system for them, they are unsure but will check in to matter and call back.      Gisselle MENDEZ Reser  01/28/20 5193

## 2020-01-28 NOTE — TELEPHONE ENCOUNTER
That sounds like shes coming down with something instead of pregnancy. Tylenol for headache, drink small amounts frequently and 6 small snacks in stead of 3 big meals a day and if cramps turn in to diarrhea she can take immodium. If headaches are more severe needs seen bp check etc.  If not just lightheaded but LOC needs evaluated.   If stomach pains become severe need sono to r/o sab or ectopic unless already done

## 2020-01-29 ENCOUNTER — TELEPHONE (OUTPATIENT)
Dept: OBGYN | Age: 20
End: 2020-01-29

## 2020-01-29 LAB
BILIRUBIN URINE: NEGATIVE
COLOR: YELLOW
COMMENT UA: ABNORMAL
GLUCOSE URINE: NEGATIVE
KETONES, URINE: ABNORMAL
LEUKOCYTE ESTERASE, URINE: NEGATIVE
NITRITE, URINE: POSITIVE
PH UA: 7 (ref 5–9)
PROTEIN UA: NEGATIVE
SPECIFIC GRAVITY UA: 1.01 (ref 1.01–1.02)
TURBIDITY: ABNORMAL
URINE HGB: NEGATIVE
UROBILINOGEN, URINE: NORMAL

## 2020-01-29 RX ORDER — BUTALBITAL, ACETAMINOPHEN AND CAFFEINE 50; 325; 40 MG/1; MG/1; MG/1
TABLET ORAL
Qty: 30 TABLET | Refills: 0 | Status: SHIPPED | OUTPATIENT
Start: 2020-01-29 | End: 2020-03-16

## 2020-01-29 NOTE — TELEPHONE ENCOUNTER
Patient calls again with c/o daily headaches ,tylenol not helping. Asking if you can give something else.

## 2020-02-03 ENCOUNTER — HOSPITAL ENCOUNTER (EMERGENCY)
Age: 20
Discharge: HOME | End: 2020-02-03
Payer: COMMERCIAL

## 2020-02-03 VITALS
TEMPERATURE: 97.7 F | DIASTOLIC BLOOD PRESSURE: 65 MMHG | SYSTOLIC BLOOD PRESSURE: 120 MMHG | RESPIRATION RATE: 16 BRPM | HEART RATE: 95 BPM | OXYGEN SATURATION: 99 %

## 2020-02-03 PROCEDURE — 6370000000 HC RX 637 (ALT 250 FOR IP): Performed by: PHYSICIAN ASSISTANT

## 2020-02-03 PROCEDURE — 99283 EMERGENCY DEPT VISIT LOW MDM: CPT

## 2020-02-03 RX ORDER — ONDANSETRON 4 MG/1
4 TABLET, ORALLY DISINTEGRATING ORAL ONCE
Status: DISCONTINUED | OUTPATIENT
Start: 2020-02-03 | End: 2020-02-03 | Stop reason: HOSPADM

## 2020-02-03 RX ORDER — ONDANSETRON 4 MG/1
4 TABLET, ORALLY DISINTEGRATING ORAL ONCE
Status: COMPLETED | OUTPATIENT
Start: 2020-02-03 | End: 2020-02-03

## 2020-02-03 RX ADMIN — ONDANSETRON 4 MG: 4 TABLET, ORALLY DISINTEGRATING ORAL at 18:42

## 2020-02-03 NOTE — ED PROVIDER NOTES
EXAM    (up to 7 for level 4, 8 or more for level 5)     ED Triage Vitals [02/03/20 1828]   BP Temp Temp src Pulse Resp SpO2 Height Weight   120/65 97.7 °F (36.5 °C) -- 95 16 99 % -- --       Physical Exam  Active and oriented ×3. Nontoxic. No acute distress. Well-hydrated. Head is atraumatic, facies symmetrical.  Pupils equal round and reactive to light, extraocular movements intact, anterior chambers clear bilaterally  Neck is supple. No adenopathy. Respirations nonlabored. Lungs clear to auscultation. No wheezes rales or rhonchi noted. Heart regular rate and rhythm. No murmur noted  Abdomen positive bowel sounds, no bruit. soft and nontender. Gravid uterus  Skin free of any obvious rashes or lesions. Extremities without edema. No calf tenderness noted. Distal pulses and sensation intact. Good capillary refill noted. No acute neurologic deficit noted. Good gait and balance. Clear speech. Good affect. Pleasant patient. DIAGNOSTIC RESULTS     none    EMERGENCY DEPARTMENT COURSE andMedical Decision Making:     Vitals:    Vitals:    02/03/20 1828   BP: 120/65   Pulse: 95   Resp: 16   Temp: 97.7 °F (36.5 °C)   SpO2: 99%       MDM/   Patient was treated with oral Zofran after which she is tolerating oral fluids. I do evaluate the patient's prescription and find that the good Rx card has it available for $14 I have supplied the patient with 1 of these cards and instructions on obtaining this medication and follow-up she will follow-up with her OB/GYN outpatient.       Strict return precautions and follow up instructions were discussed with the patient with which the patient agrees    ED Medications administered this visit:    Medications   ondansetron (ZOFRAN-ODT) disintegrating tablet 4 mg (has no administration in time range)   ondansetron (ZOFRAN-ODT) disintegrating tablet 4 mg (4 mg Oral Given 2/3/20 1842)       CONSULTS: (None if blank)  None    Procedures: (None if blank)       CLINICAL 1. Mild hyperemesis gravidarum, antepartum          DISPOSITION/PLAN   DISPOSITION Decision To Discharge 02/03/2020 07:39:10 PM      PATIENT REFERRED TO:  Austin Lewis [id number]  [phone]    In 3 days        DISCHARGE MEDICATIONS:  New Prescriptions    No medications on file              (Please note that portions of this note were completed with a voice recognition program.  Efforts were made to edit the dictations but occasionallywords are mis-transcribed.)      Safia Clinton II, PA-C (electronically signed)           Safia Clinton II, PA-C  02/03/20 1945

## 2020-02-03 NOTE — ED NOTES
Patient states that she has ongoing nausea and vomiting with her pregnancy, states that Zofran was ordered but she can't afford it. Patient states that with her last pregnancy that she had a Zofran pump. Phone number for  provided.      [facility]  02/03/20 4698

## 2020-02-13 ENCOUNTER — HOSPITAL ENCOUNTER (OUTPATIENT)
Age: 20
Discharge: HOME | End: 2020-02-13
Payer: COMMERCIAL

## 2020-02-13 LAB
ABO/RH: NORMAL
ANTIBODY SCREEN: NEGATIVE
HEPATITIS C ANTIBODY: NONREACTIVE

## 2020-02-13 PROCEDURE — 87389 HIV-1 AG W/HIV-1&-2 AB AG IA: CPT

## 2020-02-13 PROCEDURE — 85025 COMPLETE CBC W/AUTO DIFF WBC: CPT

## 2020-02-13 PROCEDURE — 86780 TREPONEMA PALLIDUM: CPT

## 2020-02-13 PROCEDURE — 86901 BLOOD TYPING SEROLOGIC RH(D): CPT

## 2020-02-13 PROCEDURE — 86900 BLOOD TYPING SEROLOGIC ABO: CPT

## 2020-02-13 PROCEDURE — 86850 RBC ANTIBODY SCREEN: CPT

## 2020-02-13 PROCEDURE — 86803 HEPATITIS C AB TEST: CPT

## 2020-02-13 PROCEDURE — 86762 RUBELLA ANTIBODY: CPT

## 2020-02-13 PROCEDURE — 36415 COLL VENOUS BLD VENIPUNCTURE: CPT

## 2020-02-13 PROCEDURE — 87340 HEPATITIS B SURFACE AG IA: CPT

## 2020-02-14 PROBLEM — Z34.90 ENCOUNTER FOR ELECTIVE INDUCTION OF LABOR: Status: RESOLVED | Noted: 2019-01-28 | Resolved: 2020-02-14

## 2020-02-14 PROBLEM — O43.112 CIRCUMVALLATE PLACENTA IN SECOND TRIMESTER: Status: RESOLVED | Noted: 2018-11-26 | Resolved: 2020-02-14

## 2020-02-14 PROBLEM — Z34.90 ENCOUNTER FOR SUPERVISION OF NORMAL PREGNANCY: Status: ACTIVE | Noted: 2020-02-14

## 2020-02-14 PROBLEM — L24.89 IRRITANT CONTACT DERMATITIS DUE TO OTHER AGENTS: Status: RESOLVED | Noted: 2017-11-01 | Resolved: 2020-02-14

## 2020-02-14 PROBLEM — N30.00 ACUTE CYSTITIS WITHOUT HEMATURIA: Status: RESOLVED | Noted: 2017-11-01 | Resolved: 2020-02-14

## 2020-02-14 LAB
ABSOLUTE EOS #: 0.09 K/UL (ref 0–0.44)
ABSOLUTE IMMATURE GRANULOCYTE: 0.04 K/UL (ref 0–0.3)
ABSOLUTE LYMPH #: 2.05 K/UL (ref 1.1–3.7)
ABSOLUTE MONO #: 0.58 K/UL (ref 0.1–1.2)
BASOPHILS # BLD: 0 % (ref 0–2)
BASOPHILS ABSOLUTE: <0.03 K/UL (ref 0–0.2)
DIFFERENTIAL TYPE: ABNORMAL
EOSINOPHILS RELATIVE PERCENT: 1 % (ref 1–4)
HCT VFR BLD CALC: 35.9 % (ref 36.3–47.1)
HEMOGLOBIN: 11.4 G/DL (ref 11.9–15.1)
HEPATITIS B SURFACE ANTIGEN: NONREACTIVE
HIV AG/AB: NONREACTIVE
IMMATURE GRANULOCYTES: 0 %
LYMPHOCYTES # BLD: 17 % (ref 24–43)
MCH RBC QN AUTO: 26.7 PG (ref 25.2–33.5)
MCHC RBC AUTO-ENTMCNC: 31.8 G/DL (ref 28.4–34.8)
MCV RBC AUTO: 84.1 FL (ref 82.6–102.9)
MONOCYTES # BLD: 5 % (ref 3–12)
NRBC AUTOMATED: 0 PER 100 WBC
PDW BLD-RTO: 15.5 % (ref 11.8–14.4)
PLATELET # BLD: 347 K/UL (ref 138–453)
PLATELET ESTIMATE: ABNORMAL
PMV BLD AUTO: 10.6 FL (ref 8.1–13.5)
RBC # BLD: 4.27 M/UL (ref 3.95–5.11)
RBC # BLD: ABNORMAL 10*6/UL
RUBV IGG SER QL: 304.8 IU/ML
SEG NEUTROPHILS: 77 % (ref 36–65)
SEGMENTED NEUTROPHILS ABSOLUTE COUNT: 9.27 K/UL (ref 1.5–8.1)
T. PALLIDUM, IGG: NONREACTIVE
WBC # BLD: 12.1 K/UL (ref 3.5–11.3)
WBC # BLD: ABNORMAL 10*3/UL

## 2020-02-20 ENCOUNTER — HOSPITAL ENCOUNTER (OUTPATIENT)
Age: 20
Setting detail: SPECIMEN
Discharge: HOME | End: 2020-02-20
Payer: COMMERCIAL

## 2020-02-20 ENCOUNTER — ROUTINE PRENATAL (OUTPATIENT)
Dept: OBGYN | Age: 20
End: 2020-02-20
Payer: COMMERCIAL

## 2020-02-20 ENCOUNTER — HOSPITAL ENCOUNTER (OUTPATIENT)
Age: 20
Discharge: HOME | End: 2020-02-20
Payer: COMMERCIAL

## 2020-02-20 VITALS — DIASTOLIC BLOOD PRESSURE: 62 MMHG | BODY MASS INDEX: 30.8 KG/M2 | SYSTOLIC BLOOD PRESSURE: 110 MMHG | WEIGHT: 163 LBS

## 2020-02-20 LAB
ABO/RH: NORMAL
ABSOLUTE EOS #: 0.08 K/UL (ref 0–0.44)
ABSOLUTE IMMATURE GRANULOCYTE: 0.03 K/UL (ref 0–0.3)
ABSOLUTE LYMPH #: 2.12 K/UL (ref 1.1–3.7)
ABSOLUTE MONO #: 0.48 K/UL (ref 0.1–1.2)
ANTIBODY SCREEN: NEGATIVE
BASOPHILS # BLD: 0 % (ref 0–2)
BASOPHILS ABSOLUTE: 0.04 K/UL (ref 0–0.2)
DIFFERENTIAL TYPE: ABNORMAL
EOSINOPHILS RELATIVE PERCENT: 1 % (ref 1–4)
HCT VFR BLD CALC: 35.9 % (ref 36.3–47.1)
HEMOGLOBIN: 11.4 G/DL (ref 11.9–15.1)
HEPATITIS B SURFACE ANTIGEN: NONREACTIVE
HEPATITIS C ANTIBODY: NONREACTIVE
HIV AG/AB: NONREACTIVE
IMMATURE GRANULOCYTES: 0 %
LYMPHOCYTES # BLD: 18 % (ref 24–43)
MCH RBC QN AUTO: 27.1 PG (ref 25.2–33.5)
MCHC RBC AUTO-ENTMCNC: 31.8 G/DL (ref 28.4–34.8)
MCV RBC AUTO: 85.3 FL (ref 82.6–102.9)
MONOCYTES # BLD: 4 % (ref 3–12)
NRBC AUTOMATED: 0 PER 100 WBC
PDW BLD-RTO: 15.7 % (ref 11.8–14.4)
PLATELET # BLD: 358 K/UL (ref 138–453)
PLATELET ESTIMATE: ABNORMAL
PMV BLD AUTO: 10.6 FL (ref 8.1–13.5)
RBC # BLD: 4.21 M/UL (ref 3.95–5.11)
RBC # BLD: ABNORMAL 10*6/UL
RUBV IGG SER QL: 273.4 IU/ML
SEG NEUTROPHILS: 77 % (ref 36–65)
SEGMENTED NEUTROPHILS ABSOLUTE COUNT: 8.89 K/UL (ref 1.5–8.1)
T. PALLIDUM, IGG: NONREACTIVE
WBC # BLD: 11.6 K/UL (ref 3.5–11.3)
WBC # BLD: ABNORMAL 10*3/UL

## 2020-02-20 PROCEDURE — 86900 BLOOD TYPING SEROLOGIC ABO: CPT

## 2020-02-20 PROCEDURE — 87389 HIV-1 AG W/HIV-1&-2 AB AG IA: CPT

## 2020-02-20 PROCEDURE — 36415 COLL VENOUS BLD VENIPUNCTURE: CPT

## 2020-02-20 PROCEDURE — 86850 RBC ANTIBODY SCREEN: CPT

## 2020-02-20 PROCEDURE — 86901 BLOOD TYPING SEROLOGIC RH(D): CPT

## 2020-02-20 PROCEDURE — G0145 SCR C/V CYTO,THINLAYER,RESCR: HCPCS

## 2020-02-20 PROCEDURE — 86803 HEPATITIS C AB TEST: CPT

## 2020-02-20 PROCEDURE — G8484 FLU IMMUNIZE NO ADMIN: HCPCS | Performed by: ADVANCED PRACTICE MIDWIFE

## 2020-02-20 PROCEDURE — 86780 TREPONEMA PALLIDUM: CPT

## 2020-02-20 PROCEDURE — 86762 RUBELLA ANTIBODY: CPT

## 2020-02-20 PROCEDURE — 4004F PT TOBACCO SCREEN RCVD TLK: CPT | Performed by: ADVANCED PRACTICE MIDWIFE

## 2020-02-20 PROCEDURE — G8427 DOCREV CUR MEDS BY ELIG CLIN: HCPCS | Performed by: ADVANCED PRACTICE MIDWIFE

## 2020-02-20 PROCEDURE — 85025 COMPLETE CBC W/AUTO DIFF WBC: CPT

## 2020-02-20 PROCEDURE — 99214 OFFICE O/P EST MOD 30 MIN: CPT | Performed by: ADVANCED PRACTICE MIDWIFE

## 2020-02-20 PROCEDURE — 87340 HEPATITIS B SURFACE AG IA: CPT

## 2020-02-20 PROCEDURE — G8417 CALC BMI ABV UP PARAM F/U: HCPCS | Performed by: ADVANCED PRACTICE MIDWIFE

## 2020-02-20 NOTE — PROGRESS NOTES
prenatal physical exam tab in visit navigator        Results reviewed today:    No results found for this visit on 20. See prenatal vital sign section and fetal assessment section    ASSESSMENT & Plan    Diagnosis Orders   1. Encounter for other specified  screening  Prenatal Testing for Fetal Aneuploidy    Cystic fibrosis carrier study    PAP SMEAR   2. Encounter for supervision of other normal pregnancy in first trimester     3. 12 weeks gestation of pregnancy               I have discontinued Shannon Laws's ALBUTEROL SULFATE HFA IN. I am also having her maintain her acetaminophen and butalbital-acetaminophen-caffeine. Return in about 4 weeks (around 3/19/2020) for ob. There are no Patient Instructions on file for this visit.           Malu Keyes,2020 9:30 AM

## 2020-02-27 RX ORDER — ONDANSETRON 4 MG/1
4 TABLET, ORALLY DISINTEGRATING ORAL 3 TIMES DAILY PRN
Qty: 30 TABLET | Refills: 0 | Status: SHIPPED | OUTPATIENT
Start: 2020-02-27 | End: 2020-03-16 | Stop reason: ALTCHOICE

## 2020-03-02 LAB — CYTOLOGY REPORT: NORMAL

## 2020-03-03 ENCOUNTER — HOSPITAL ENCOUNTER (EMERGENCY)
Age: 20
Discharge: HOME | End: 2020-03-03
Payer: COMMERCIAL

## 2020-03-03 VITALS
HEART RATE: 109 BPM | WEIGHT: 170 LBS | BODY MASS INDEX: 32.1 KG/M2 | OXYGEN SATURATION: 98 % | RESPIRATION RATE: 18 BRPM | DIASTOLIC BLOOD PRESSURE: 54 MMHG | HEIGHT: 61 IN | SYSTOLIC BLOOD PRESSURE: 102 MMHG | TEMPERATURE: 97.7 F

## 2020-03-03 LAB
-: ABNORMAL
ABO/RH: NORMAL
ABSOLUTE EOS #: 0.13 K/UL (ref 0–0.44)
ABSOLUTE IMMATURE GRANULOCYTE: 0.04 K/UL (ref 0–0.3)
ABSOLUTE LYMPH #: 3.58 K/UL (ref 1.1–3.7)
ABSOLUTE MONO #: 0.85 K/UL (ref 0.1–1.2)
ALBUMIN SERPL-MCNC: 3.6 G/DL (ref 3.5–5.2)
ALBUMIN/GLOBULIN RATIO: 1.4 (ref 1–2.5)
ALP BLD-CCNC: 99 U/L (ref 35–104)
ALT SERPL-CCNC: 7 U/L (ref 5–33)
AMORPHOUS: ABNORMAL
ANION GAP SERPL CALCULATED.3IONS-SCNC: 10 MMOL/L (ref 9–17)
AST SERPL-CCNC: 8 U/L
BACTERIA: ABNORMAL
BASOPHILS # BLD: 0 % (ref 0–2)
BASOPHILS ABSOLUTE: 0.06 K/UL (ref 0–0.2)
BILIRUB SERPL-MCNC: <0.1 MG/DL (ref 0.3–1.2)
BILIRUBIN URINE: NEGATIVE
BUN BLDV-MCNC: 9 MG/DL (ref 6–20)
BUN/CREAT BLD: 16 (ref 9–20)
CALCIUM SERPL-MCNC: 9 MG/DL (ref 8.6–10.4)
CASTS UA: ABNORMAL /LPF
CHLORIDE BLD-SCNC: 104 MMOL/L (ref 98–107)
CO2: 21 MMOL/L (ref 20–31)
COLOR: YELLOW
COMMENT UA: ABNORMAL
CREAT SERPL-MCNC: 0.55 MG/DL (ref 0.5–0.9)
CRYSTALS, UA: ABNORMAL /HPF
DIFFERENTIAL TYPE: ABNORMAL
EOSINOPHILS RELATIVE PERCENT: 1 % (ref 1–4)
EPITHELIAL CELLS UA: ABNORMAL /HPF (ref 0–25)
GFR AFRICAN AMERICAN: >60 ML/MIN
GFR NON-AFRICAN AMERICAN: >60 ML/MIN
GFR SERPL CREATININE-BSD FRML MDRD: ABNORMAL ML/MIN/{1.73_M2}
GFR SERPL CREATININE-BSD FRML MDRD: ABNORMAL ML/MIN/{1.73_M2}
GLUCOSE BLD-MCNC: 89 MG/DL (ref 70–99)
GLUCOSE URINE: NEGATIVE
HCG QUANTITATIVE: ABNORMAL IU/L
HCT VFR BLD CALC: 34 % (ref 36.3–47.1)
HEMOGLOBIN: 10.9 G/DL (ref 11.9–15.1)
IMMATURE GRANULOCYTES: 0 %
KETONES, URINE: NEGATIVE
LEUKOCYTE ESTERASE, URINE: ABNORMAL
LIPASE: 25 U/L (ref 13–60)
LYMPHOCYTES # BLD: 23 % (ref 24–43)
MCH RBC QN AUTO: 26.9 PG (ref 25.2–33.5)
MCHC RBC AUTO-ENTMCNC: 32.1 G/DL (ref 28.4–34.8)
MCV RBC AUTO: 84 FL (ref 82.6–102.9)
MONOCYTES # BLD: 6 % (ref 3–12)
MUCUS: ABNORMAL
NITRITE, URINE: NEGATIVE
NRBC AUTOMATED: 0 PER 100 WBC
OTHER OBSERVATIONS UA: ABNORMAL
PDW BLD-RTO: 15.6 % (ref 11.8–14.4)
PH UA: 6 (ref 5–9)
PLATELET # BLD: 328 K/UL (ref 138–453)
PLATELET ESTIMATE: ABNORMAL
PMV BLD AUTO: 10.7 FL (ref 8.1–13.5)
POTASSIUM SERPL-SCNC: 3.6 MMOL/L (ref 3.7–5.3)
PROTEIN UA: NEGATIVE
RBC # BLD: 4.05 M/UL (ref 3.95–5.11)
RBC # BLD: ABNORMAL 10*6/UL
RBC UA: ABNORMAL /HPF (ref 0–2)
RENAL EPITHELIAL, UA: ABNORMAL /HPF
SEG NEUTROPHILS: 70 % (ref 36–65)
SEGMENTED NEUTROPHILS ABSOLUTE COUNT: 10.7 K/UL (ref 1.5–8.1)
SODIUM BLD-SCNC: 135 MMOL/L (ref 135–144)
SPECIFIC GRAVITY UA: 1.01 (ref 1.01–1.02)
TOTAL PROTEIN: 6.2 G/DL (ref 6.4–8.3)
TRICHOMONAS: ABNORMAL
TURBIDITY: CLEAR
URINE HGB: NEGATIVE
UROBILINOGEN, URINE: NORMAL
WBC # BLD: 15.4 K/UL (ref 3.5–11.3)
WBC # BLD: ABNORMAL 10*3/UL
WBC UA: ABNORMAL /HPF (ref 0–5)
YEAST: ABNORMAL

## 2020-03-03 PROCEDURE — 6370000000 HC RX 637 (ALT 250 FOR IP): Performed by: PHYSICIAN ASSISTANT

## 2020-03-03 PROCEDURE — 83690 ASSAY OF LIPASE: CPT

## 2020-03-03 PROCEDURE — 84702 CHORIONIC GONADOTROPIN TEST: CPT

## 2020-03-03 PROCEDURE — 86901 BLOOD TYPING SEROLOGIC RH(D): CPT

## 2020-03-03 PROCEDURE — 80053 COMPREHEN METABOLIC PANEL: CPT

## 2020-03-03 PROCEDURE — 86900 BLOOD TYPING SEROLOGIC ABO: CPT

## 2020-03-03 PROCEDURE — 81001 URINALYSIS AUTO W/SCOPE: CPT

## 2020-03-03 PROCEDURE — 85025 COMPLETE CBC W/AUTO DIFF WBC: CPT

## 2020-03-03 PROCEDURE — 99284 EMERGENCY DEPT VISIT MOD MDM: CPT

## 2020-03-03 PROCEDURE — 36415 COLL VENOUS BLD VENIPUNCTURE: CPT

## 2020-03-03 RX ORDER — OXYCODONE HYDROCHLORIDE AND ACETAMINOPHEN 5; 325 MG/1; MG/1
1 TABLET ORAL ONCE
Status: COMPLETED | OUTPATIENT
Start: 2020-03-03 | End: 2020-03-03

## 2020-03-03 RX ADMIN — OXYCODONE HYDROCHLORIDE AND ACETAMINOPHEN 1 TABLET: 5; 325 TABLET ORAL at 23:11

## 2020-03-03 ASSESSMENT — PAIN DESCRIPTION - PAIN TYPE: TYPE: ACUTE PAIN

## 2020-03-03 ASSESSMENT — PAIN DESCRIPTION - DESCRIPTORS: DESCRIPTORS: CONSTANT;SHARP

## 2020-03-03 ASSESSMENT — PAIN SCALES - GENERAL
PAINLEVEL_OUTOF10: 7
PAINLEVEL_OUTOF10: 7

## 2020-03-03 ASSESSMENT — PAIN DESCRIPTION - LOCATION: LOCATION: ABDOMEN

## 2020-03-04 NOTE — ED PROVIDER NOTES
TABLET    Take 1 tablet by mouth 3 times daily as needed for Nausea or Vomiting       ALLERGIES     Soap and Silver    FAMILY HISTORY       Family History   Problem Relation Age of Onset    Allergies Mother     Crohn's Disease Mother     High Cholesterol Father     Migraines Father     Stroke Father     Heart Disease Father     Cancer Father     Diabetes Father     Cancer Maternal Grandmother     Cancer Maternal Grandfather     Migraines Brother     Cancer Paternal Grandmother         SOCIAL HISTORY       Social History     Socioeconomic History    Marital status: Single     Spouse name: Not on file    Number of children: Not on file    Years of education: Not on file    Highest education level: Not on file   Occupational History    Not on file   Social Needs    Financial resource strain: Not on file    Food insecurity:     Worry: Not on file     Inability: Not on file    Transportation needs:     Medical: Not on file     Non-medical: Not on file   Tobacco Use    Smoking status: Current Every Day Smoker     Packs/day: 0.50     Years: 2.00     Pack years: 1.00     Types: Cigarettes    Smokeless tobacco: Never Used    Tobacco comment: trying to quit on own   Substance and Sexual Activity    Alcohol use: No     Alcohol/week: 0.0 standard drinks     Comment: occasionally with family, hardly ever    Drug use: No    Sexual activity: Yes     Partners: Male     Birth control/protection: Injection   Lifestyle    Physical activity:     Days per week: Not on file     Minutes per session: Not on file    Stress: Not on file   Relationships    Social connections:     Talks on phone: Not on file     Gets together: Not on file     Attends Holiness service: Not on file     Active member of club or organization: Not on file     Attends meetings of clubs or organizations: Not on file     Relationship status: Not on file    Intimate partner violence:     Fear of current or ex partner: Not on file Emotionally abused: Not on file     Physically abused: Not on file     Forced sexual activity: Not on file   Other Topics Concern    Not on file   Social History Narrative    Not on file       REVIEW OF SYSTEMS     Review of Systems  Except as noted above the remainder of the review of systems was reviewed and is negative. SCREENINGS           PHYSICAL EXAM    (up to 7 for level 4, 8 or more for level 5)     ED Triage Vitals [03/03/20 2133]   BP Temp Temp src Pulse Resp SpO2 Height Weight   (!) 102/54 -- -- 109 18 98 % 5' 1\" (1.549 m) 170 lb (77.1 kg)       Physical Exam  Active and oriented ×3. Nontoxic. No acute distress. Well-hydrated. Head is atraumatic, facies symmetrical.  Pupils equal round and reactive to light, extraocular movements intact, anterior chambers clear bilaterally  Neck is supple. No adenopathy. Respirations nonlabored. Lungs clear to auscultation. No wheezes rales or rhonchi noted. Heart regular rate and rhythm. No murmur noted  Abdomen positive bowel sounds, no bruit. soft with mild tenderness in the left upper quadrant patient is tearful but not indicating any significant guarding or rigidity. No tenderness in the lower quadrants or suprapubic region. Uterus is palpable but well below the level of the umbilicus. Skin free of any obvious rashes or lesions. Extremities without edema. No calf tenderness noted. No acute neurologic deficit noted. Clear speech.       DIAGNOSTIC RESULTS     RADIOLOGY: (none if blank)   Interpretation per the Radiologistbelow, if available at the time of this note:  Bedside transabdominal ultrasound performed by me does reveal an intrauterine pregnancy with positive fetal cardiac activity and fetal movement fetal heart rate of 156 on my exam      LABS:  Labs Reviewed   CBC WITH AUTO DIFFERENTIAL - Abnormal; Notable for the following components:       Result Value    WBC 15.4 (*)     Hemoglobin 10.9 (*)     Hematocrit 34.0 (*)     RDW 15.6 (*) Seg Neutrophils 70 (*)     Lymphocytes 23 (*)     Segs Absolute 10.70 (*)     All other components within normal limits   COMPREHENSIVE METABOLIC PANEL W/ REFLEX TO MG FOR LOW K - Abnormal; Notable for the following components:    Potassium 3.6 (*)     Total Bilirubin <0.10 (*)     Total Protein 6.2 (*)     All other components within normal limits   URINALYSIS - Abnormal; Notable for the following components:    Leukocyte Esterase, Urine SMALL (*)     All other components within normal limits   MICROSCOPIC URINALYSIS - Abnormal; Notable for the following components:    Bacteria, UA 2+ (*)     All other components within normal limits   LIPASE   HCG, QUANTITATIVE, PREGNANCY   ABO/RH       All other labs were within normal range or not returned as of this dictation. EMERGENCY DEPARTMENT COURSE andMedical Decision Making:     Vitals:    Vitals:    03/03/20 2133 03/03/20 2145   BP: (!) 102/54    Pulse: 109    Resp: 18    Temp:  97.7 °F (36.5 °C)   TempSrc:  Temporal   SpO2: 98%    Weight: 170 lb (77.1 kg)    Height: 5' 1\" (1.549 m)        MDM/   Patient does have a leukocytosis although this can be seen at this stage in pregnancy and there is no sign of significant urinary tract infection, no right lower quadrant tenderness noted. No elevation of lipase or LFTs noted. Patient requests medication for pain I do explained that he medicines for pain given at this time may have an effect on the pregnancy and the baby.   After extensive discussion the patient does request 1 single dose of pain medication and using shared decision making at understanding potential risks would like to have this and it is provided at her request.    Strict return precautions and follow up instructions were discussed with the patient with which the patient agrees    ED Medications administered this visit:    Medications   oxyCODONE-acetaminophen (PERCOCET) 5-325 MG per tablet 1 tablet (has no administration in time range) CONSULTS: (None if blank)  None    Procedures: (None if blank)       CLINICAL       1.  Abdominal pain during pregnancy in second trimester          DISPOSITION/PLAN   DISPOSITION        PATIENT REFERRED TO:  [facility] [address]  [phone]    In 2 days        DISCHARGE MEDICATIONS:  New Prescriptions    No medications on file              (Please note that portions of this note were completed with a voice recognition program.  Efforts were made to edit the dictations but occasionallywords are mis-transcribed.)      Rodolfo Wu II, PA-C (electronically signed)           Rodolfo Wu II, PA-C  03/03/20 6298

## 2020-03-16 ENCOUNTER — HOSPITAL ENCOUNTER (EMERGENCY)
Age: 20
Discharge: HOME | End: 2020-03-16
Attending: EMERGENCY MEDICINE
Payer: COMMERCIAL

## 2020-03-16 VITALS
SYSTOLIC BLOOD PRESSURE: 106 MMHG | HEART RATE: 83 BPM | RESPIRATION RATE: 20 BRPM | WEIGHT: 170 LBS | DIASTOLIC BLOOD PRESSURE: 70 MMHG | OXYGEN SATURATION: 99 % | BODY MASS INDEX: 32.12 KG/M2 | TEMPERATURE: 98.9 F

## 2020-03-16 LAB
ABSOLUTE EOS #: 0.11 K/UL (ref 0–0.44)
ABSOLUTE IMMATURE GRANULOCYTE: 0.07 K/UL (ref 0–0.3)
ABSOLUTE LYMPH #: 1.81 K/UL (ref 1.1–3.7)
ABSOLUTE MONO #: 0.57 K/UL (ref 0.1–1.2)
ANION GAP SERPL CALCULATED.3IONS-SCNC: 11 MMOL/L (ref 9–17)
BASOPHILS # BLD: 0 % (ref 0–2)
BASOPHILS ABSOLUTE: 0.03 K/UL (ref 0–0.2)
BUN BLDV-MCNC: 4 MG/DL (ref 6–20)
BUN/CREAT BLD: 12 (ref 9–20)
CALCIUM SERPL-MCNC: 8.6 MG/DL (ref 8.6–10.4)
CHLORIDE BLD-SCNC: 104 MMOL/L (ref 98–107)
CO2: 18 MMOL/L (ref 20–31)
CREAT SERPL-MCNC: 0.33 MG/DL (ref 0.5–0.9)
DIFFERENTIAL TYPE: ABNORMAL
DIRECT EXAM: NORMAL
DIRECT EXAM: NORMAL
EOSINOPHILS RELATIVE PERCENT: 1 % (ref 1–4)
GFR AFRICAN AMERICAN: >60 ML/MIN
GFR NON-AFRICAN AMERICAN: >60 ML/MIN
GFR SERPL CREATININE-BSD FRML MDRD: ABNORMAL ML/MIN/{1.73_M2}
GFR SERPL CREATININE-BSD FRML MDRD: ABNORMAL ML/MIN/{1.73_M2}
GLUCOSE BLD-MCNC: 100 MG/DL (ref 70–99)
HCT VFR BLD CALC: 33.9 % (ref 36.3–47.1)
HEMOGLOBIN: 11.1 G/DL (ref 11.9–15.1)
IMMATURE GRANULOCYTES: 1 %
LACTIC ACID, SEPSIS WHOLE BLOOD: NORMAL MMOL/L (ref 0.5–1.9)
LACTIC ACID, SEPSIS: 0.8 MMOL/L (ref 0.5–1.9)
LYMPHOCYTES # BLD: 13 % (ref 24–43)
Lab: NORMAL
Lab: NORMAL
MCH RBC QN AUTO: 27.5 PG (ref 25.2–33.5)
MCHC RBC AUTO-ENTMCNC: 32.7 G/DL (ref 28.4–34.8)
MCV RBC AUTO: 83.9 FL (ref 82.6–102.9)
MONOCYTES # BLD: 4 % (ref 3–12)
NRBC AUTOMATED: 0 PER 100 WBC
PDW BLD-RTO: 15.3 % (ref 11.8–14.4)
PLATELET # BLD: 321 K/UL (ref 138–453)
PLATELET ESTIMATE: ABNORMAL
PMV BLD AUTO: 10.5 FL (ref 8.1–13.5)
POTASSIUM SERPL-SCNC: 3.6 MMOL/L (ref 3.7–5.3)
RBC # BLD: 4.04 M/UL (ref 3.95–5.11)
RBC # BLD: ABNORMAL 10*6/UL
SEG NEUTROPHILS: 81 % (ref 36–65)
SEGMENTED NEUTROPHILS ABSOLUTE COUNT: 11.1 K/UL (ref 1.5–8.1)
SODIUM BLD-SCNC: 133 MMOL/L (ref 135–144)
SPECIMEN DESCRIPTION: NORMAL
SPECIMEN DESCRIPTION: NORMAL
WBC # BLD: 13.7 K/UL (ref 3.5–11.3)
WBC # BLD: ABNORMAL 10*3/UL

## 2020-03-16 PROCEDURE — 87804 INFLUENZA ASSAY W/OPTIC: CPT

## 2020-03-16 PROCEDURE — 85025 COMPLETE CBC W/AUTO DIFF WBC: CPT

## 2020-03-16 PROCEDURE — 2580000003 HC RX 258: Performed by: EMERGENCY MEDICINE

## 2020-03-16 PROCEDURE — 87880 STREP A ASSAY W/OPTIC: CPT

## 2020-03-16 PROCEDURE — 83605 ASSAY OF LACTIC ACID: CPT

## 2020-03-16 PROCEDURE — 80048 BASIC METABOLIC PNL TOTAL CA: CPT

## 2020-03-16 PROCEDURE — 96360 HYDRATION IV INFUSION INIT: CPT

## 2020-03-16 PROCEDURE — 6370000000 HC RX 637 (ALT 250 FOR IP): Performed by: EMERGENCY MEDICINE

## 2020-03-16 PROCEDURE — 36415 COLL VENOUS BLD VENIPUNCTURE: CPT

## 2020-03-16 PROCEDURE — 99283 EMERGENCY DEPT VISIT LOW MDM: CPT

## 2020-03-16 RX ORDER — ACETAMINOPHEN 325 MG/1
650 TABLET ORAL ONCE
Status: COMPLETED | OUTPATIENT
Start: 2020-03-16 | End: 2020-03-16

## 2020-03-16 RX ORDER — 0.9 % SODIUM CHLORIDE 0.9 %
1000 INTRAVENOUS SOLUTION INTRAVENOUS ONCE
Status: COMPLETED | OUTPATIENT
Start: 2020-03-16 | End: 2020-03-16

## 2020-03-16 RX ADMIN — SODIUM CHLORIDE 1000 ML: 9 INJECTION, SOLUTION INTRAVENOUS at 12:31

## 2020-03-16 RX ADMIN — ACETAMINOPHEN 650 MG: 325 TABLET, FILM COATED ORAL at 12:37

## 2020-03-16 ASSESSMENT — PAIN DESCRIPTION - ONSET: ONSET: GRADUAL

## 2020-03-16 ASSESSMENT — PAIN DESCRIPTION - DESCRIPTORS
DESCRIPTORS: ACHING
DESCRIPTORS: SORE

## 2020-03-16 ASSESSMENT — PAIN DESCRIPTION - PROGRESSION: CLINICAL_PROGRESSION: GRADUALLY WORSENING

## 2020-03-16 ASSESSMENT — PAIN DESCRIPTION - FREQUENCY
FREQUENCY: CONTINUOUS
FREQUENCY: CONTINUOUS

## 2020-03-16 ASSESSMENT — PAIN DESCRIPTION - PAIN TYPE
TYPE: ACUTE PAIN
TYPE: ACUTE PAIN

## 2020-03-16 ASSESSMENT — PAIN DESCRIPTION - LOCATION
LOCATION: GENERALIZED
LOCATION: THROAT

## 2020-03-16 ASSESSMENT — PAIN SCALES - GENERAL
PAINLEVEL_OUTOF10: 4
PAINLEVEL_OUTOF10: 7

## 2020-03-16 NOTE — ED PROVIDER NOTES
[facility] ED  eMERGENCY dEPARTMENT eNCOUnter      Pt Name: Vini Muhammad  MRN: [id number]  [place] 2000  Date of evaluation: 3/16/2020  Provider: Brenton Dance, MD    [address] [place] [address]     Chief Complaint   Patient presents with    Fatigue     all sx onset x 2 days. Pt states she is 16 weeks pregnant    Cough    Headache    Generalized Body Aches         HISTORY OF PRESENT ILLNESS   (Location/Symptom, Timing/Onset, Context/Setting,Quality, Duration, Modifying Factors, Severity)  Note limiting factors. Vini Muhammad is a16 y.o. female who presents to the emergency department complaint of 2 days of myalgias and weakness. Patient has not had a documented elevated temperature has had a mild sore throat and nonproductive cough. She has not been exposed to anyone that is traveled outside the country nor has she. No history of risk factors in terms of coronavirus exposure. The patient is 16 weeks pregnant and is G2, . She has had no vaginal discharge or abdominal pain. No urinary symptoms. She denies any flank pain. No rash    HPI    Nursing Notes werereviewed. REVIEW OF SYSTEMS    (2-9 systems for level 4, 10 or more for level 5)     Review of Systems  Constitutional no documented fevers or chills  Eyes no loss of vision or redness to the eyes  ENT she has had a sore throat without headache or ear pain. No neck stiffness  Cardiac no chest pain or palpitations  Respiratory she is not really been short of breath but has had a nonproductive cough. She is a smoker  GI no vomiting or diarrhea. She does have a history of ulcerative colitis   is reviewed in HPI  Skin no rash or bruising. Neurologic no focal weakness or trouble with her speech  Except as noted above the remainder of the review of systems was reviewed and negative.        PAST MEDICAL HISTORY     Past Medical History:   Diagnosis Date    Asthma     pt has albuterol inhaler    Colitis     Colitis     Contusion of toe of right foot, subsequent encounter 4/14/2019    Depression     2011, on meds    Dysmenorrhea in the adolescent     Facial abscess     was cyst left cheek    Rectal bleed     Vertigo          SURGICALHISTORY       Past Surgical History:   Procedure Laterality Date    ABSCESS DRAINAGE Left 2011    once left cheek    COLONOSCOPY      COLONOSCOPY  12/05/2013        UPPER GASTROINTESTINAL ENDOSCOPY  12/05/2013             CURRENT MEDICATIONS       Previous Medications    ACETAMINOPHEN (TYLENOL) 500 MG TABLET    Take 500 mg by mouth every 6 hours as needed for Pain    BUTALBITAL-ACETAMINOPHEN-CAFFEINE (FIORICET, ESGIC) -40 MG PER TABLET    1-2 tablets every 6 hours, no more than 6 tablets in 24 hours    ONDANSETRON (ZOFRAN-ODT) 4 MG DISINTEGRATING TABLET    Take 1 tablet by mouth 3 times daily as needed for Nausea or Vomiting       ALLERGIES     Soap and Silver    FAMILY HISTORY       Family History   Problem Relation Age of Onset    Allergies Mother     Crohn's Disease Mother     High Cholesterol Father     Migraines Father     Stroke Father     Heart Disease Father     Cancer Father     Diabetes Father     Cancer Maternal Grandmother     Cancer Maternal Grandfather     Migraines Brother     Cancer Paternal Grandmother           SOCIAL HISTORY       Social History     Socioeconomic History    Marital status: Single     Spouse name: Not on file    Number of children: Not on file    Years of education: Not on file    Highest education level: Not on file   Occupational History    Not on file   Social Needs    Financial resource strain: Not on file    Food insecurity     Worry: Not on file     Inability: Not on file    Transportation needs     Medical: Not on file     Non-medical: Not on file   Tobacco Use    Smoking status: Current Every Day Smoker     Packs/day: 0.50     Years: 2.00     Pack years: 1.00     Types: Cigarettes    Smokeless tobacco: come down nicely to 86. She remained afebrile with a good pulse ox. The patient says she feels a lot better after getting the fluids. I did elect because of the CO2 of 18 and the blood pressure of 90 just to make sure that a lactate was normal and it was 0.9. She is doing well and can be discharged at this point. CONSULTS:  None    PROCEDURES:  Unlessotherwise noted below, none      Procedures    FINAL IMPRESSION    Fatigue and myalgias in a patient with likely viral syndrome  16-week pregnancy  Mild dehydration    DISPOSITION/PLAN   DISPOSITION    Plan is a work note for 2 days. I have asked her to return if she has increasing fever, shortness of breath, or other difficulties. PATIENT REFERRED TO:  No follow-up provider specified.     DISCHARGE MEDICATIONS:  [unfilled]           (Please note that portions of this note were completed with a voice recognition program.  Efforts were made to edit the dictations but occasionally words are mis-transcribed.)      Leonel Roberts MD (electronically signed)  Attending Emergency Physician         Leonel Reyes MD  03/16/20 [id number]

## 2020-03-16 NOTE — LETTER
[facility] ED  [address] [place] [address] [place] [address]  Phone: [phone]  Fax: [phone]               March 16, 2020    Patient: Jeanette Arce   YOB: 2000   Date of Visit: 3/16/2020       To Whom It May Concern:    Sakshi Cuevas was seen and treated in our emergency department on 3/16/2020. She may return to work on 3/17/2020. Gregg Manzanares was in emergency department with above named patient. .      Sincerely,       Josefina Christian RN         Signature:__________________________________

## 2020-03-18 ENCOUNTER — TELEPHONE (OUTPATIENT)
Dept: OBGYN | Age: 20
End: 2020-03-18

## 2020-03-18 NOTE — TELEPHONE ENCOUNTER
Pt was seen in ER on 3/16/2020 for fatigue,cough, HA and generalized body aches. Pt had an elevated WBC count. She only c/o a runny nose and cough now. She will need R/S correct? How for out? Pt is 16 weeks pregnant.

## 2020-04-02 ENCOUNTER — ROUTINE PRENATAL (OUTPATIENT)
Dept: OBGYN | Age: 20
End: 2020-04-02
Payer: COMMERCIAL

## 2020-04-02 VITALS
DIASTOLIC BLOOD PRESSURE: 62 MMHG | WEIGHT: 162.13 LBS | BODY MASS INDEX: 30.63 KG/M2 | SYSTOLIC BLOOD PRESSURE: 100 MMHG

## 2020-04-02 PROCEDURE — G8428 CUR MEDS NOT DOCUMENT: HCPCS | Performed by: ADVANCED PRACTICE MIDWIFE

## 2020-04-02 PROCEDURE — G8417 CALC BMI ABV UP PARAM F/U: HCPCS | Performed by: ADVANCED PRACTICE MIDWIFE

## 2020-04-02 PROCEDURE — 99213 OFFICE O/P EST LOW 20 MIN: CPT | Performed by: ADVANCED PRACTICE MIDWIFE

## 2020-04-02 PROCEDURE — 4004F PT TOBACCO SCREEN RCVD TLK: CPT | Performed by: ADVANCED PRACTICE MIDWIFE

## 2020-04-02 NOTE — PROGRESS NOTES
Julienne Child is here at 18w2d for:    Chief Complaint   Patient presents with   Sage Kern Routine Prenatal Visit       Estimated Due Date: Estimated Date of Delivery: 20    OB History    Para Term  AB Living   2 1 1     1   SAB TAB Ectopic Molar Multiple Live Births           0 1      # Outcome Date GA Lbr Dionisio/2nd Weight Sex Delivery Anes PTL Lv   2 Current            1 Term 19 39w2d 08:06 / 00:24 5 lb 5.3 oz (2.419 kg) F Vag-Spont EPI N GRACIELA        Past Medical History:   Diagnosis Date    Asthma     pt has albuterol inhaler    Colitis     Colitis     Contusion of toe of right foot, subsequent encounter 2019    Depression     , on meds    Dysmenorrhea in the adolescent     Facial abscess     was cyst left cheek    Rectal bleed     Vertigo        Past Surgical History:   Procedure Laterality Date    ABSCESS DRAINAGE Left     once left cheek    COLONOSCOPY      COLONOSCOPY  2013        UPPER GASTROINTESTINAL ENDOSCOPY  2013           Social History     Tobacco Use   Smoking Status Current Every Day Smoker    Packs/day: 0.50    Years: 2.00    Pack years: 1.00    Types: Cigarettes   Smokeless Tobacco Never Used   Tobacco Comment    trying to quit on own        Social History     Substance and Sexual Activity   Alcohol Use No    Alcohol/week: 0.0 standard drinks    Comment: occasionally with family, hardly ever       No results found for this visit on 20. Vitals:  /62   Wt 162 lb 2 oz (73.5 kg)   LMP  (LMP Unknown)   BMI 30.63 kg/m²   Estimated body mass index is 30.63 kg/m² as calculated from the following:    Height as of 3/3/20: 5' 1\" (1.549 m). Weight as of this encounter: 162 lb 2 oz (73.5 kg).       HPI: here for routine ob visit, \"better\"    No PT denies fever, chills, nausea and vomiting       Abdomen: enlarged, gravid, soft, nontender     Results reviewed today:    No results found for this visit on 04/02/20. See prenatal vital sign section and fetal assessment section    ASSESSMENT & Plan    Diagnosis Orders   1. 18 weeks gestation of pregnancy     2. Encounter for supervision of other normal pregnancy in second trimester               I am having Shannon Laws maintain her acetaminophen and Prenatal MV-Min-Fe Fum-FA-DHA (PRENATAL 1 PO). Return in about 2 weeks (around 4/16/2020), or keep appointment for 20 week sono and appt. There are no Patient Instructions on file for this visit.           Antonio Keyes,4/2/2020 3:02 PM

## 2020-04-07 RX ORDER — FLUCONAZOLE 150 MG/1
150 TABLET ORAL ONCE
Qty: 1 TABLET | Refills: 0 | Status: SHIPPED | OUTPATIENT
Start: 2020-04-07 | End: 2020-04-07

## 2020-04-16 ENCOUNTER — ROUTINE PRENATAL (OUTPATIENT)
Dept: OBGYN | Age: 20
End: 2020-04-16
Payer: COMMERCIAL

## 2020-04-16 VITALS — BODY MASS INDEX: 31.59 KG/M2 | SYSTOLIC BLOOD PRESSURE: 112 MMHG | DIASTOLIC BLOOD PRESSURE: 70 MMHG | WEIGHT: 167.2 LBS

## 2020-04-16 PROCEDURE — 4004F PT TOBACCO SCREEN RCVD TLK: CPT | Performed by: ADVANCED PRACTICE MIDWIFE

## 2020-04-16 PROCEDURE — G8417 CALC BMI ABV UP PARAM F/U: HCPCS | Performed by: ADVANCED PRACTICE MIDWIFE

## 2020-04-16 PROCEDURE — G8428 CUR MEDS NOT DOCUMENT: HCPCS | Performed by: ADVANCED PRACTICE MIDWIFE

## 2020-04-16 PROCEDURE — 99213 OFFICE O/P EST LOW 20 MIN: CPT | Performed by: ADVANCED PRACTICE MIDWIFE

## 2020-05-06 ENCOUNTER — ROUTINE PRENATAL (OUTPATIENT)
Dept: OBGYN | Age: 20
End: 2020-05-06
Payer: COMMERCIAL

## 2020-05-06 ENCOUNTER — HOSPITAL ENCOUNTER (OUTPATIENT)
Age: 20
Discharge: HOME | End: 2020-05-06
Payer: COMMERCIAL

## 2020-05-06 ENCOUNTER — HOSPITAL ENCOUNTER (OUTPATIENT)
Age: 20
Setting detail: SPECIMEN
Discharge: HOME | End: 2020-05-06
Payer: COMMERCIAL

## 2020-05-06 ENCOUNTER — TELEPHONE (OUTPATIENT)
Dept: OBGYN | Age: 20
End: 2020-05-06

## 2020-05-06 VITALS — WEIGHT: 168 LBS | BODY MASS INDEX: 31.74 KG/M2 | DIASTOLIC BLOOD PRESSURE: 68 MMHG | SYSTOLIC BLOOD PRESSURE: 114 MMHG

## 2020-05-06 LAB
% FETAL BLEED: NORMAL %
FETAL BLEED VOLUME: NORMAL ML
RHOGAM DOSES REQUIRED: 1

## 2020-05-06 PROCEDURE — 36415 COLL VENOUS BLD VENIPUNCTURE: CPT

## 2020-05-06 PROCEDURE — 4004F PT TOBACCO SCREEN RCVD TLK: CPT | Performed by: ADVANCED PRACTICE MIDWIFE

## 2020-05-06 PROCEDURE — 99213 OFFICE O/P EST LOW 20 MIN: CPT | Performed by: ADVANCED PRACTICE MIDWIFE

## 2020-05-06 PROCEDURE — 87070 CULTURE OTHR SPECIMN AEROBIC: CPT

## 2020-05-06 PROCEDURE — G8417 CALC BMI ABV UP PARAM F/U: HCPCS | Performed by: ADVANCED PRACTICE MIDWIFE

## 2020-05-06 PROCEDURE — G8427 DOCREV CUR MEDS BY ELIG CLIN: HCPCS | Performed by: ADVANCED PRACTICE MIDWIFE

## 2020-05-06 PROCEDURE — 85460 HEMOGLOBIN FETAL: CPT
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
## 2020-05-09 LAB
CULTURE: NORMAL
Lab: NORMAL
SPECIMEN DESCRIPTION: NORMAL

## 2020-06-08 ENCOUNTER — TELEPHONE (OUTPATIENT)
Dept: OBGYN | Age: 20
End: 2020-06-08

## 2020-06-08 NOTE — TELEPHONE ENCOUNTER
Patient calls with leg cramps that have been bothering her for the last several days. Patient asking for excuse for work for the last 3 days.

## 2020-06-11 ENCOUNTER — HOSPITAL ENCOUNTER (OUTPATIENT)
Age: 20
Discharge: HOME | End: 2020-06-11
Payer: COMMERCIAL

## 2020-06-11 ENCOUNTER — ROUTINE PRENATAL (OUTPATIENT)
Dept: OBGYN | Age: 20
End: 2020-06-11
Payer: COMMERCIAL

## 2020-06-11 ENCOUNTER — HOSPITAL ENCOUNTER (OUTPATIENT)
Age: 20
Setting detail: SPECIMEN
Discharge: HOME | End: 2020-06-11
Payer: COMMERCIAL

## 2020-06-11 VITALS — WEIGHT: 172 LBS | BODY MASS INDEX: 32.5 KG/M2

## 2020-06-11 LAB
BILIRUBIN, POC: ABNORMAL
BLOOD URINE, POC: ABNORMAL
CHP ED QC CHECK: ABNORMAL
CLARITY, POC: ABNORMAL
COLOR, POC: NEGATIVE
GLUCOSE BLD-MCNC: 178 MG/DL
GLUCOSE URINE, POC: ABNORMAL
HGB, POC: 10.1
KETONES, POC: ABNORMAL
LEUKOCYTE EST, POC: 1
NITRITE, POC: ABNORMAL
PH, POC: 5
PROTEIN, POC: ABNORMAL
SPECIFIC GRAVITY, POC: 1.02
UROBILINOGEN, POC: ABNORMAL

## 2020-06-11 PROCEDURE — G8417 CALC BMI ABV UP PARAM F/U: HCPCS | Performed by: ADVANCED PRACTICE MIDWIFE

## 2020-06-11 PROCEDURE — 87186 SC STD MICRODIL/AGAR DIL: CPT

## 2020-06-11 PROCEDURE — 86901 BLOOD TYPING SEROLOGIC RH(D): CPT

## 2020-06-11 PROCEDURE — 86850 RBC ANTIBODY SCREEN: CPT

## 2020-06-11 PROCEDURE — G8427 DOCREV CUR MEDS BY ELIG CLIN: HCPCS | Performed by: ADVANCED PRACTICE MIDWIFE

## 2020-06-11 PROCEDURE — 87088 URINE BACTERIA CULTURE: CPT

## 2020-06-11 PROCEDURE — 36415 COLL VENOUS BLD VENIPUNCTURE: CPT

## 2020-06-11 PROCEDURE — 4004F PT TOBACCO SCREEN RCVD TLK: CPT | Performed by: ADVANCED PRACTICE MIDWIFE

## 2020-06-11 PROCEDURE — 99213 OFFICE O/P EST LOW 20 MIN: CPT | Performed by: ADVANCED PRACTICE MIDWIFE

## 2020-06-11 PROCEDURE — 87086 URINE CULTURE/COLONY COUNT: CPT

## 2020-06-11 RX ORDER — FERROUS SULFATE 325(65) MG
325 TABLET ORAL
Qty: 90 TABLET | Refills: 1 | Status: SHIPPED | OUTPATIENT
Start: 2020-06-11 | End: 2020-12-30

## 2020-06-11 NOTE — PROGRESS NOTES
calculated from the following:    Height as of 3/3/20: 5' 1\" (1.549 m). Weight as of this encounter: 172 lb (78 kg). HPI: here for routine ob visit and gtt, also c/o low back pain and leg cramps     PT denies fever, chills, nausea and vomiting       Abdomen: enlarged, gravid, soft, nontender     Results reviewed today:    Results for orders placed or performed in visit on 06/11/20   POCT Urinalysis no Micro   Result Value Ref Range    Color, UA negative     Clarity, UA hazy     Glucose, UA POC neg     Bilirubin, UA neg     Ketones, UA neg     Spec Grav, UA 1.020     Blood, UA POC neg     pH, UA 5     Protein, UA POC neg     Urobilinogen, UA neg     Leukocytes, UA 1     Nitrite, UA neg        See prenatal vital sign section and fetal assessment section    ASSESSMENT & Plan    Diagnosis Orders   1. Abdominal pain affecting pregnancy  POCT Urinalysis no Micro    Culture, Urine   2. Encounter for supervision of other normal pregnancy in second trimester  POCT Urinalysis no Micro   3. 28 weeks gestation of pregnancy     reviewed danger signs, await culture          I am having Shannon Laws maintain her acetaminophen and Prenatal MV-Min-Fe Fum-FA-DHA (PRENATAL 1 PO). Return in about 2 weeks (around 6/25/2020) for ob 30wkUS+tdap. There are no Patient Instructions on file for this visit.           Babita Keyes,6/11/2020 9:18 AM

## 2020-06-11 NOTE — LETTER
[address]  Phone: [phone]  Fax: Froy Bo        June 11, 2020     Patient: Lupillo Zambrano   YOB: 2000   Date of Visit: 6/11/2020       To Whom It May Concern: It is my medical opinion that Kimmy Monroe should be excused from work 6/6/-6/9/20 due to pregnancy complications. .    If you have any questions or concerns, please don't hesitate to call.     Sincerely,        AHSAN Donovan CNM

## 2020-06-12 ENCOUNTER — HOSPITAL ENCOUNTER (OUTPATIENT)
Dept: INFUSION THERAPY | Age: 20
Discharge: HOME | End: 2020-06-12
Payer: COMMERCIAL

## 2020-06-12 VITALS
TEMPERATURE: 98.1 F | SYSTOLIC BLOOD PRESSURE: 129 MMHG | HEART RATE: 98 BPM | RESPIRATION RATE: 20 BRPM | DIASTOLIC BLOOD PRESSURE: 65 MMHG

## 2020-06-12 LAB
CULTURE: ABNORMAL
Lab: ABNORMAL
SPECIMEN DESCRIPTION: ABNORMAL

## 2020-06-12 PROCEDURE — 6360000002 HC RX W HCPCS: Performed by: ADVANCED PRACTICE MIDWIFE

## 2020-06-12 PROCEDURE — 96372 THER/PROPH/DIAG INJ SC/IM: CPT

## 2020-06-12 RX ADMIN — HUMAN RHO(D) IMMUNE GLOBULIN 300 MCG: 300 INJECTION, SOLUTION INTRAMUSCULAR at 12:53

## 2020-06-15 ENCOUNTER — HOSPITAL ENCOUNTER (OUTPATIENT)
Dept: LAB | Age: 20
Discharge: HOME | End: 2020-06-15
Payer: COMMERCIAL

## 2020-06-15 LAB
AMOUNT GLUCOSE GIVEN: 100 G
GLUCOSE FASTING: 96 MG/DL (ref 65–99)
GLUCOSE TOLERANCE TEST 1 HOUR: 135 MG/DL (ref 65–184)
GLUCOSE TOLERANCE TEST 2 HOUR: 124 MG/DL (ref 65–139)
GLUCOSE TOLERANCE TEST 3 HOUR: 51 MG/DL (ref 65–130)

## 2020-06-15 PROCEDURE — 82951 GLUCOSE TOLERANCE TEST (GTT): CPT

## 2020-06-15 PROCEDURE — 82952 GTT-ADDED SAMPLES: CPT

## 2020-06-15 PROCEDURE — 36415 COLL VENOUS BLD VENIPUNCTURE: CPT

## 2020-06-16 RX ORDER — NITROFURANTOIN 25; 75 MG/1; MG/1
100 CAPSULE ORAL 2 TIMES DAILY
Qty: 14 CAPSULE | Refills: 0 | Status: SHIPPED | OUTPATIENT
Start: 2020-06-16 | End: 2020-06-23

## 2020-06-25 ENCOUNTER — ROUTINE PRENATAL (OUTPATIENT)
Dept: OBGYN | Age: 20
End: 2020-06-25
Payer: COMMERCIAL

## 2020-06-25 VITALS — WEIGHT: 173.8 LBS | DIASTOLIC BLOOD PRESSURE: 80 MMHG | BODY MASS INDEX: 32.84 KG/M2 | SYSTOLIC BLOOD PRESSURE: 122 MMHG

## 2020-06-25 PROCEDURE — G8427 DOCREV CUR MEDS BY ELIG CLIN: HCPCS | Performed by: ADVANCED PRACTICE MIDWIFE

## 2020-06-25 PROCEDURE — G8417 CALC BMI ABV UP PARAM F/U: HCPCS | Performed by: ADVANCED PRACTICE MIDWIFE

## 2020-06-25 PROCEDURE — 4004F PT TOBACCO SCREEN RCVD TLK: CPT | Performed by: ADVANCED PRACTICE MIDWIFE

## 2020-06-25 PROCEDURE — 99213 OFFICE O/P EST LOW 20 MIN: CPT | Performed by: ADVANCED PRACTICE MIDWIFE

## 2020-06-25 NOTE — LETTER
[address]  Phone: [phone]  Fax: Froy Bo        2020     Patient: Fito Boyle   YOB: 2000   Date of Visit: 2020       To Whom It May Concern: It is my medical opinion that Stacy Gunn should be off work due to work exacerbating her  contractions. able to return to work after 6 weeks postpartum recovery with expected due date 20. .    If you have any questions or concerns, please don't hesitate to call.     Sincerely,        AHSAN Garcia CNM

## 2020-06-30 ENCOUNTER — TELEPHONE (OUTPATIENT)
Dept: OBGYN | Age: 20
End: 2020-06-30

## 2020-07-07 ENCOUNTER — ROUTINE PRENATAL (OUTPATIENT)
Dept: OBGYN | Age: 20
End: 2020-07-07
Payer: COMMERCIAL

## 2020-07-07 VITALS — BODY MASS INDEX: 33.22 KG/M2 | SYSTOLIC BLOOD PRESSURE: 116 MMHG | WEIGHT: 175.8 LBS | DIASTOLIC BLOOD PRESSURE: 68 MMHG

## 2020-07-07 PROCEDURE — G8417 CALC BMI ABV UP PARAM F/U: HCPCS | Performed by: ADVANCED PRACTICE MIDWIFE

## 2020-07-07 PROCEDURE — G8427 DOCREV CUR MEDS BY ELIG CLIN: HCPCS | Performed by: ADVANCED PRACTICE MIDWIFE

## 2020-07-07 PROCEDURE — 4004F PT TOBACCO SCREEN RCVD TLK: CPT | Performed by: ADVANCED PRACTICE MIDWIFE

## 2020-07-07 PROCEDURE — 99213 OFFICE O/P EST LOW 20 MIN: CPT | Performed by: ADVANCED PRACTICE MIDWIFE

## 2020-07-07 NOTE — PROGRESS NOTES
Esther Peterson is here at 32w0d for:    Chief Complaint   Patient presents with    Routine Prenatal Visit     Off and on irregular contractions. Estimated Due Date: Estimated Date of Delivery: 20    OB History    Para Term  AB Living   2 1 1     1   SAB TAB Ectopic Molar Multiple Live Births           0 1      # Outcome Date GA Lbr Dionisio/2nd Weight Sex Delivery Anes PTL Lv   2 Current            1 Term 19 39w2d 08:06 / 00:24 5 lb 5.3 oz (2.419 kg) F Vag-Spont EPI N GRACIELA        Past Medical History:   Diagnosis Date    Asthma     pt has albuterol inhaler    Colitis     Colitis     Contusion of toe of right foot, subsequent encounter 2019    Depression     , on meds    Dysmenorrhea in the adolescent     Facial abscess     was cyst left cheek    Rectal bleed     Vertigo        Past Surgical History:   Procedure Laterality Date    ABSCESS DRAINAGE Left     once left cheek    COLONOSCOPY      COLONOSCOPY  2013        UPPER GASTROINTESTINAL ENDOSCOPY  2013           Social History     Tobacco Use   Smoking Status Current Every Day Smoker    Packs/day: 0.50    Years: 2.00    Pack years: 1.00    Types: Cigarettes   Smokeless Tobacco Never Used   Tobacco Comment    trying to quit on own        Social History     Substance and Sexual Activity   Alcohol Use No    Alcohol/week: 0.0 standard drinks    Comment: occasionally with family, hardly ever       No results found for this visit on 20. Vitals:  /68   Wt 175 lb 12.8 oz (79.7 kg)   LMP  (LMP Unknown)   BMI 33.22 kg/m²   Estimated body mass index is 33.22 kg/m² as calculated from the following:    Height as of 3/3/20: 5' 1\" (1.549 m). Weight as of this encounter: 175 lb 12.8 oz (79.7 kg).       HPI: here for routine ob visit, denies c/o     PT denies fever, chills, nausea and vomiting       Abdomen: enlarged, gravid, soft, nontender     Results reviewed today:    No results found for this visit on 07/07/20. See prenatal vital sign section and fetal assessment section    ASSESSMENT & Plan    Diagnosis Orders   1. 32 weeks gestation of pregnancy     2. Encounter for supervision of other normal pregnancy in third trimester               I am having Shannon Laws maintain her acetaminophen, Prenatal MV-Min-Fe Fum-FA-DHA (PRENATAL 1 PO), and ferrous sulfate. Return in about 2 weeks (around 7/21/2020) for ob. There are no Patient Instructions on file for this visit.           Tiffanie Keyes,7/7/2020 2:20 PM

## 2020-07-09 ENCOUNTER — TELEPHONE (OUTPATIENT)
Dept: OBGYN | Age: 20
End: 2020-07-09

## 2020-07-09 NOTE — TELEPHONE ENCOUNTER
Patient called and left message asking for work note to be faxed to [employer], I called to discuss with patient and obtain fax number,I left message and asked patient to call back .

## 2020-07-21 ENCOUNTER — HOSPITAL ENCOUNTER (OUTPATIENT)
Age: 20
Setting detail: SPECIMEN
Discharge: HOME | End: 2020-07-21
Payer: COMMERCIAL

## 2020-07-21 ENCOUNTER — ROUTINE PRENATAL (OUTPATIENT)
Dept: OBGYN | Age: 20
End: 2020-07-21
Payer: COMMERCIAL

## 2020-07-21 VITALS — SYSTOLIC BLOOD PRESSURE: 108 MMHG | WEIGHT: 176.4 LBS | BODY MASS INDEX: 33.33 KG/M2 | DIASTOLIC BLOOD PRESSURE: 68 MMHG

## 2020-07-21 PROCEDURE — 87510 GARDNER VAG DNA DIR PROBE: CPT

## 2020-07-21 PROCEDURE — G8427 DOCREV CUR MEDS BY ELIG CLIN: HCPCS | Performed by: ADVANCED PRACTICE MIDWIFE

## 2020-07-21 PROCEDURE — 87591 N.GONORRHOEAE DNA AMP PROB: CPT

## 2020-07-21 PROCEDURE — 87491 CHLMYD TRACH DNA AMP PROBE: CPT

## 2020-07-21 PROCEDURE — 99213 OFFICE O/P EST LOW 20 MIN: CPT | Performed by: ADVANCED PRACTICE MIDWIFE

## 2020-07-21 PROCEDURE — 87660 TRICHOMONAS VAGIN DIR PROBE: CPT

## 2020-07-21 PROCEDURE — 4004F PT TOBACCO SCREEN RCVD TLK: CPT | Performed by: ADVANCED PRACTICE MIDWIFE

## 2020-07-21 PROCEDURE — 87480 CANDIDA DNA DIR PROBE: CPT

## 2020-07-21 PROCEDURE — G8417 CALC BMI ABV UP PARAM F/U: HCPCS | Performed by: ADVANCED PRACTICE MIDWIFE

## 2020-07-21 RX ORDER — FLUCONAZOLE 150 MG/1
150 TABLET ORAL ONCE
Qty: 1 TABLET | Refills: 0 | Status: SHIPPED | OUTPATIENT
Start: 2020-07-21 | End: 2020-07-21

## 2020-07-22 LAB
C TRACH DNA GENITAL QL NAA+PROBE: NEGATIVE
DIRECT EXAM: ABNORMAL
Lab: ABNORMAL
N. GONORRHOEAE DNA: NEGATIVE
SPECIMEN DESCRIPTION: ABNORMAL
SPECIMEN DESCRIPTION: NORMAL

## 2020-07-22 RX ORDER — METRONIDAZOLE 250 MG/1
250 TABLET ORAL 3 TIMES DAILY
Qty: 21 TABLET | Refills: 0 | Status: SHIPPED | OUTPATIENT
Start: 2020-07-22 | End: 2020-07-29

## 2020-08-03 ENCOUNTER — ROUTINE PRENATAL (OUTPATIENT)
Dept: OBGYN | Age: 20
End: 2020-08-03
Payer: COMMERCIAL

## 2020-08-03 VITALS — BODY MASS INDEX: 34.65 KG/M2 | DIASTOLIC BLOOD PRESSURE: 70 MMHG | SYSTOLIC BLOOD PRESSURE: 116 MMHG | WEIGHT: 183.4 LBS

## 2020-08-03 PROCEDURE — G8417 CALC BMI ABV UP PARAM F/U: HCPCS | Performed by: ADVANCED PRACTICE MIDWIFE

## 2020-08-03 PROCEDURE — 99213 OFFICE O/P EST LOW 20 MIN: CPT | Performed by: ADVANCED PRACTICE MIDWIFE

## 2020-08-03 PROCEDURE — G8427 DOCREV CUR MEDS BY ELIG CLIN: HCPCS | Performed by: ADVANCED PRACTICE MIDWIFE

## 2020-08-03 PROCEDURE — 4004F PT TOBACCO SCREEN RCVD TLK: CPT | Performed by: ADVANCED PRACTICE MIDWIFE

## 2020-08-03 NOTE — PROGRESS NOTES
Liana Bowen is here at 35w6d for:    Chief Complaint   Patient presents with    Routine Prenatal Visit     Irregular contractions. Estimated Due Date: Estimated Date of Delivery: 20    OB History    Para Term  AB Living   2 1 1     1   SAB TAB Ectopic Molar Multiple Live Births           0 1      # Outcome Date GA Lbr Dionisio/2nd Weight Sex Delivery Anes PTL Lv   2 Current            1 Term 19 39w2d 08:06  00:24 5 lb 5.3 oz (2.419 kg) F Vag-Spont EPI N GRACIELA        Past Medical History:   Diagnosis Date    Asthma     pt has albuterol inhaler    Colitis     Colitis     Contusion of toe of right foot, subsequent encounter 2019    Depression     , on meds    Dysmenorrhea in the adolescent     Facial abscess     was cyst left cheek    Rectal bleed     Vertigo        Past Surgical History:   Procedure Laterality Date    ABSCESS DRAINAGE Left     once left cheek    COLONOSCOPY      COLONOSCOPY  2013        UPPER GASTROINTESTINAL ENDOSCOPY  2013           Social History     Tobacco Use   Smoking Status Current Every Day Smoker    Packs/day: 0.50    Years: 2.00    Pack years: 1.00    Types: Cigarettes   Smokeless Tobacco Never Used   Tobacco Comment    trying to quit on own        Social History     Substance and Sexual Activity   Alcohol Use No    Alcohol/week: 0.0 standard drinks    Comment: occasionally with family, hardly ever       No results found for this visit on 20. Vitals:  /70   Wt 183 lb 6.4 oz (83.2 kg)   LMP  (LMP Unknown)   BMI 34.65 kg/m²   Estimated body mass index is 34.65 kg/m² as calculated from the following:    Height as of 3/3/20: 5' 1\" (1.549 m). Weight as of this encounter: 183 lb 6.4 oz (83.2 kg).       HPI: here for routine ob visit, c/o pressure     PT denies fever, chills, nausea and vomiting       Abdomen: enlarged, gravid, soft, nontender     Results reviewed today:    No results found for this visit on 08/03/20. See prenatal vital sign section and fetal assessment section    ASSESSMENT & Plan    Diagnosis Orders   1. 35 weeks gestation of pregnancy     2. Encounter for supervision of other normal pregnancy in second trimester               I am having Shannon Laws maintain her acetaminophen, Prenatal MV-Min-Fe Fum-FA-DHA (PRENATAL 1 PO), and ferrous sulfate. Return in about 1 week (around 8/10/2020) for obgbs. There are no Patient Instructions on file for this visit.           Yas Keyes,8/3/2020 5:35 PM

## 2020-08-10 ENCOUNTER — HOSPITAL ENCOUNTER (OUTPATIENT)
Age: 20
Setting detail: SPECIMEN
Discharge: HOME | End: 2020-08-10
Payer: COMMERCIAL

## 2020-08-10 ENCOUNTER — ROUTINE PRENATAL (OUTPATIENT)
Dept: OBGYN | Age: 20
End: 2020-08-10
Payer: COMMERCIAL

## 2020-08-10 VITALS — DIASTOLIC BLOOD PRESSURE: 70 MMHG | WEIGHT: 184 LBS | BODY MASS INDEX: 34.77 KG/M2 | SYSTOLIC BLOOD PRESSURE: 114 MMHG

## 2020-08-10 LAB
BILIRUBIN, POC: ABNORMAL
BLOOD URINE, POC: ABNORMAL
CLARITY, POC: ABNORMAL
COLOR, POC: YELLOW
GLUCOSE URINE, POC: ABNORMAL
KETONES, POC: ABNORMAL
LEUKOCYTE EST, POC: ABNORMAL
NITRITE, POC: ABNORMAL
PH, POC: 6
PROTEIN, POC: ABNORMAL
SPECIFIC GRAVITY, POC: 1.01
UROBILINOGEN, POC: ABNORMAL

## 2020-08-10 PROCEDURE — 99213 OFFICE O/P EST LOW 20 MIN: CPT | Performed by: ADVANCED PRACTICE MIDWIFE

## 2020-08-10 PROCEDURE — 87086 URINE CULTURE/COLONY COUNT: CPT

## 2020-08-10 PROCEDURE — G8427 DOCREV CUR MEDS BY ELIG CLIN: HCPCS | Performed by: ADVANCED PRACTICE MIDWIFE

## 2020-08-10 PROCEDURE — 87081 CULTURE SCREEN ONLY: CPT

## 2020-08-10 PROCEDURE — G8417 CALC BMI ABV UP PARAM F/U: HCPCS | Performed by: ADVANCED PRACTICE MIDWIFE

## 2020-08-10 PROCEDURE — 4004F PT TOBACCO SCREEN RCVD TLK: CPT | Performed by: ADVANCED PRACTICE MIDWIFE

## 2020-08-10 PROCEDURE — 86403 PARTICLE AGGLUT ANTBDY SCRN: CPT

## 2020-08-10 NOTE — PROGRESS NOTES
Eder Milian is here at 36w6d for:    Chief Complaint   Patient presents with    Routine Prenatal Visit     GBS today. C/O pressure.         Estimated Due Date: Estimated Date of Delivery: 20    OB History    Para Term  AB Living   2 1 1     1   SAB TAB Ectopic Molar Multiple Live Births           0 1      # Outcome Date GA Lbr Dionisio/2nd Weight Sex Delivery Anes PTL Lv   2 Current            1 Term 19 39w2d 08:06  00:24 5 lb 5.3 oz (2.419 kg) F Vag-Spont EPI N GRACIELA        Past Medical History:   Diagnosis Date    Asthma     pt has albuterol inhaler    Colitis     Colitis     Contusion of toe of right foot, subsequent encounter 2019    Depression     , on meds    Dysmenorrhea in the adolescent     Facial abscess     was cyst left cheek    Rectal bleed     Vertigo        Past Surgical History:   Procedure Laterality Date    ABSCESS DRAINAGE Left     once left cheek    COLONOSCOPY      COLONOSCOPY  2013        UPPER GASTROINTESTINAL ENDOSCOPY  2013           Social History     Tobacco Use   Smoking Status Current Every Day Smoker    Packs/day: 0.50    Years: 2.00    Pack years: 1.00    Types: Cigarettes   Smokeless Tobacco Never Used   Tobacco Comment    trying to quit on own        Social History     Substance and Sexual Activity   Alcohol Use No    Alcohol/week: 0.0 standard drinks    Comment: occasionally with family, hardly ever       Results for orders placed or performed in visit on 08/10/20   POCT Urinalysis no Micro   Result Value Ref Range    Color, UA yellow     Clarity, UA hazy     Glucose, UA POC neg     Bilirubin, UA neg     Ketones, UA neg     Spec Grav, UA 1.015     Blood, UA POC neg     pH, UA 6     Protein, UA POC neg     Urobilinogen, UA neg     Leukocytes, UA 3+     Nitrite, UA neg        Vitals:  /70   Wt 184 lb (83.5 kg)   LMP  (LMP Unknown)   BMI 34.77 kg/m²   Estimated body mass index is 34.77 kg/m² as calculated from the following:    Height as of 3/3/20: 5' 1\" (1.549 m). Weight as of this encounter: 184 lb (83.5 kg). HPI: here for routine ob visit, denies c/o     PT denies fever, chills, nausea and vomiting       Abdomen: enlarged, gravid, soft, nontender         Results reviewed today:    Results for orders placed or performed in visit on 08/10/20   POCT Urinalysis no Micro   Result Value Ref Range    Color, UA yellow     Clarity, UA hazy     Glucose, UA POC neg     Bilirubin, UA neg     Ketones, UA neg     Spec Grav, UA 1.015     Blood, UA POC neg     pH, UA 6     Protein, UA POC neg     Urobilinogen, UA neg     Leukocytes, UA 3+     Nitrite, UA neg        See prenatal vital sign section and fetal assessment section    ASSESSMENT & Plan    Diagnosis Orders   1. Encounter for supervision of other normal pregnancy in third trimester     2. Encounter for supervision of other normal pregnancy in second trimester  POCT Urinalysis no Micro   3. 36 weeks gestation of pregnancy               I am having Shannon Laws maintain her acetaminophen, Prenatal MV-Min-Fe Fum-FA-DHA (PRENATAL 1 PO), and ferrous sulfate. Return in about 1 week (around 8/17/2020) for ob. There are no Patient Instructions on file for this visit.           Gilmar Keyes,8/10/2020 10:11 AM

## 2020-08-11 LAB
CULTURE: NORMAL
Lab: NORMAL
SPECIMEN DESCRIPTION: NORMAL

## 2020-08-13 LAB
CULTURE: NORMAL
Lab: NORMAL
SPECIMEN DESCRIPTION: NORMAL

## 2020-08-14 ENCOUNTER — HOSPITAL ENCOUNTER (OUTPATIENT)
Age: 20
Discharge: HOME | End: 2020-08-14
Attending: MIDWIFE | Admitting: MIDWIFE
Payer: COMMERCIAL

## 2020-08-14 VITALS
SYSTOLIC BLOOD PRESSURE: 108 MMHG | TEMPERATURE: 98.2 F | DIASTOLIC BLOOD PRESSURE: 56 MMHG | HEART RATE: 81 BPM | RESPIRATION RATE: 18 BRPM

## 2020-08-14 LAB
-: ABNORMAL
AMORPHOUS: ABNORMAL
BACTERIA: ABNORMAL
BILIRUBIN URINE: NEGATIVE
CASTS UA: ABNORMAL /LPF
COLOR: YELLOW
COMMENT UA: ABNORMAL
CRYSTALS, UA: ABNORMAL /HPF
EPITHELIAL CELLS UA: ABNORMAL /HPF (ref 0–25)
GLUCOSE URINE: NEGATIVE
KETONES, URINE: NEGATIVE
LEUKOCYTE ESTERASE, URINE: ABNORMAL
MUCUS: ABNORMAL
NITRITE, URINE: NEGATIVE
OTHER OBSERVATIONS UA: ABNORMAL
PH UA: 6.5 (ref 5–9)
PROTEIN UA: NEGATIVE
RBC UA: ABNORMAL /HPF (ref 0–2)
RENAL EPITHELIAL, UA: ABNORMAL /HPF
SPECIFIC GRAVITY UA: 1.02 (ref 1.01–1.02)
TRICHOMONAS: ABNORMAL
TURBIDITY: CLEAR
URINE HGB: NEGATIVE
UROBILINOGEN, URINE: NORMAL
WBC UA: ABNORMAL /HPF (ref 0–5)
YEAST: ABNORMAL

## 2020-08-14 PROCEDURE — 6370000000 HC RX 637 (ALT 250 FOR IP): Performed by: MIDWIFE

## 2020-08-14 PROCEDURE — 81001 URINALYSIS AUTO W/SCOPE: CPT

## 2020-08-14 PROCEDURE — 87086 URINE CULTURE/COLONY COUNT: CPT

## 2020-08-14 RX ORDER — CEPHALEXIN 500 MG/1
500 CAPSULE ORAL EVERY 12 HOURS SCHEDULED
Status: DISCONTINUED | OUTPATIENT
Start: 2020-08-14 | End: 2020-08-14

## 2020-08-14 RX ORDER — SODIUM CHLORIDE, SODIUM LACTATE, POTASSIUM CHLORIDE, CALCIUM CHLORIDE 600; 310; 30; 20 MG/100ML; MG/100ML; MG/100ML; MG/100ML
INJECTION, SOLUTION INTRAVENOUS CONTINUOUS
Status: DISCONTINUED | OUTPATIENT
Start: 2020-08-14 | End: 2020-08-14

## 2020-08-14 RX ORDER — CEPHALEXIN 500 MG/1
500 CAPSULE ORAL EVERY 12 HOURS SCHEDULED
Status: DISCONTINUED | OUTPATIENT
Start: 2020-08-14 | End: 2020-08-14 | Stop reason: HOSPADM

## 2020-08-14 RX ORDER — ACETAMINOPHEN 500 MG
1000 TABLET ORAL EVERY 6 HOURS PRN
Status: DISCONTINUED | OUTPATIENT
Start: 2020-08-14 | End: 2020-08-14 | Stop reason: HOSPADM

## 2020-08-14 RX ADMIN — CEPHALEXIN 500 MG: 500 CAPSULE ORAL at 18:40

## 2020-08-14 RX ADMIN — ACETAMINOPHEN 1000 MG: 500 TABLET, FILM COATED ORAL at 18:40

## 2020-08-14 ASSESSMENT — PAIN DESCRIPTION - DESCRIPTORS
DESCRIPTORS: ACHING
DESCRIPTORS: ACHING

## 2020-08-14 ASSESSMENT — PAIN SCALES - GENERAL: PAINLEVEL_OUTOF10: 3

## 2020-08-14 NOTE — PROGRESS NOTES
RN notified KHALIF Packer CNM about pt's decrease in pressure and UA results. Gilbert Song CNM to gave  RN orders to give the pt a 500ml bolus and 500ml at 200ml/hr, extra strength tylenol, Ceflex 500 mg, and send urine for a culture.

## 2020-08-14 NOTE — PROGRESS NOTES
RN given orders to discharge pt at this time. Orlin Major called keflex over to pharmacy at this time. Pt aware.

## 2020-08-14 NOTE — PROGRESS NOTES
Pt refused IV bolus and asked if there was any other way to receive fluids. Pradip Abreu, CNM order pt to drink 1000ml of water while here and take a 1000ml pitcher of fluid home. Pt agreed.

## 2020-08-15 LAB
CULTURE: NORMAL
Lab: NORMAL
SPECIMEN DESCRIPTION: NORMAL

## 2020-08-17 ENCOUNTER — ROUTINE PRENATAL (OUTPATIENT)
Dept: OBGYN | Age: 20
End: 2020-08-17
Payer: COMMERCIAL

## 2020-08-17 VITALS — BODY MASS INDEX: 35.3 KG/M2 | DIASTOLIC BLOOD PRESSURE: 74 MMHG | SYSTOLIC BLOOD PRESSURE: 118 MMHG | WEIGHT: 186.8 LBS

## 2020-08-17 PROCEDURE — G8417 CALC BMI ABV UP PARAM F/U: HCPCS | Performed by: ADVANCED PRACTICE MIDWIFE

## 2020-08-17 PROCEDURE — 4004F PT TOBACCO SCREEN RCVD TLK: CPT | Performed by: ADVANCED PRACTICE MIDWIFE

## 2020-08-17 PROCEDURE — 99213 OFFICE O/P EST LOW 20 MIN: CPT | Performed by: ADVANCED PRACTICE MIDWIFE

## 2020-08-17 PROCEDURE — G8427 DOCREV CUR MEDS BY ELIG CLIN: HCPCS | Performed by: ADVANCED PRACTICE MIDWIFE

## 2020-08-17 RX ORDER — CEPHALEXIN 500 MG/1
500 CAPSULE ORAL 3 TIMES DAILY
Status: ON HOLD | COMMUNITY
End: 2020-08-26 | Stop reason: HOSPADM

## 2020-08-17 NOTE — PROGRESS NOTES
Kelly Pride is here at 41w10d for:    Chief Complaint   Patient presents with    Routine Prenatal Visit     Taking Keflex for UTI. Estimated Due Date: Estimated Date of Delivery: 20    OB History    Para Term  AB Living   2 1 1     1   SAB TAB Ectopic Molar Multiple Live Births           0 1      # Outcome Date GA Lbr Dionisio/2nd Weight Sex Delivery Anes PTL Lv   2 Current            1 Term 19 39w2d 08:06 / 00:24 5 lb 5.3 oz (2.419 kg) F Vag-Spont EPI N GRACIELA        Past Medical History:   Diagnosis Date    Asthma     pt has albuterol inhaler    Colitis     Colitis     Contusion of toe of right foot, subsequent encounter 2019    Depression     , on meds    Dysmenorrhea in the adolescent     Facial abscess     was cyst left cheek    Rectal bleed     Vertigo        Past Surgical History:   Procedure Laterality Date    ABSCESS DRAINAGE Left     once left cheek    COLONOSCOPY      COLONOSCOPY  2013        UPPER GASTROINTESTINAL ENDOSCOPY  2013           Social History     Tobacco Use   Smoking Status Current Every Day Smoker    Packs/day: 0.50    Years: 2.00    Pack years: 1.00    Types: Cigarettes   Smokeless Tobacco Never Used   Tobacco Comment    trying to quit on own        Social History     Substance and Sexual Activity   Alcohol Use No    Alcohol/week: 0.0 standard drinks    Comment: occasionally with family, hardly ever       No results found for this visit on 20. Vitals:  /74   Wt 186 lb 12.8 oz (84.7 kg)   LMP  (LMP Unknown)   BMI 35.30 kg/m²   Estimated body mass index is 35.3 kg/m² as calculated from the following:    Height as of 3/3/20: 5' 1\" (1.549 m). Weight as of this encounter: 186 lb 12.8 oz (84.7 kg).       HPI: here for routine ob visit, denies c/o, requests term induction     PT denies fever, chills, nausea and vomiting       Abdomen: enlarged, gravid, soft, nontender         Results reviewed today:    No results found for this visit on 08/17/20. See prenatal vital sign section and fetal assessment section    ASSESSMENT & Plan    Diagnosis Orders   1. 37 weeks gestation of pregnancy     2. Encounter for supervision of other normal pregnancy in second trimester               I am having Shannon Laws maintain her acetaminophen, Prenatal MV-Min-Fe Fum-FA-DHA (PRENATAL 1 PO), ferrous sulfate, and cephALEXin. Return in about 1 week (around 8/24/2020) for ob. There are no Patient Instructions on file for this visit.           Ab Keyes,8/17/2020 11:35 AM

## 2020-08-24 ENCOUNTER — ROUTINE PRENATAL (OUTPATIENT)
Dept: OBGYN | Age: 20
End: 2020-08-24
Payer: COMMERCIAL

## 2020-08-24 VITALS — BODY MASS INDEX: 35.64 KG/M2 | DIASTOLIC BLOOD PRESSURE: 80 MMHG | SYSTOLIC BLOOD PRESSURE: 130 MMHG | WEIGHT: 188.6 LBS

## 2020-08-24 PROCEDURE — G8427 DOCREV CUR MEDS BY ELIG CLIN: HCPCS | Performed by: ADVANCED PRACTICE MIDWIFE

## 2020-08-24 PROCEDURE — G8417 CALC BMI ABV UP PARAM F/U: HCPCS | Performed by: ADVANCED PRACTICE MIDWIFE

## 2020-08-24 PROCEDURE — 99213 OFFICE O/P EST LOW 20 MIN: CPT | Performed by: ADVANCED PRACTICE MIDWIFE

## 2020-08-24 PROCEDURE — 4004F PT TOBACCO SCREEN RCVD TLK: CPT | Performed by: ADVANCED PRACTICE MIDWIFE

## 2020-08-24 RX ORDER — ACETAMINOPHEN 325 MG/1
650 TABLET ORAL EVERY 4 HOURS PRN
Status: CANCELLED | OUTPATIENT
Start: 2020-08-24

## 2020-08-24 RX ORDER — FENTANYL CITRATE 50 UG/ML
25 INJECTION, SOLUTION INTRAMUSCULAR; INTRAVENOUS
Status: CANCELLED | OUTPATIENT
Start: 2020-08-24

## 2020-08-24 RX ORDER — LIDOCAINE HYDROCHLORIDE 10 MG/ML
30 INJECTION, SOLUTION EPIDURAL; INFILTRATION; INTRACAUDAL; PERINEURAL PRN
Status: CANCELLED | OUTPATIENT
Start: 2020-08-24

## 2020-08-24 RX ORDER — ONDANSETRON 2 MG/ML
4 INJECTION INTRAMUSCULAR; INTRAVENOUS EVERY 6 HOURS PRN
Status: CANCELLED | OUTPATIENT
Start: 2020-08-24

## 2020-08-24 RX ORDER — METHYLERGONOVINE MALEATE 0.2 MG/ML
200 INJECTION INTRAVENOUS PRN
Status: CANCELLED | OUTPATIENT
Start: 2020-08-24

## 2020-08-24 RX ORDER — BUTORPHANOL TARTRATE 1 MG/ML
1 INJECTION, SOLUTION INTRAMUSCULAR; INTRAVENOUS
Status: CANCELLED | OUTPATIENT
Start: 2020-08-24

## 2020-08-24 RX ORDER — SODIUM CHLORIDE 0.9 % (FLUSH) 0.9 %
10 SYRINGE (ML) INJECTION PRN
Status: CANCELLED | OUTPATIENT
Start: 2020-08-24

## 2020-08-24 RX ORDER — CARBOPROST TROMETHAMINE 250 UG/ML
250 INJECTION, SOLUTION INTRAMUSCULAR PRN
Status: CANCELLED | OUTPATIENT
Start: 2020-08-24

## 2020-08-24 RX ORDER — SEVOFLURANE 250 ML/250ML
1 LIQUID RESPIRATORY (INHALATION) CONTINUOUS PRN
Status: CANCELLED | OUTPATIENT
Start: 2020-08-24

## 2020-08-24 RX ORDER — SODIUM CHLORIDE, SODIUM LACTATE, POTASSIUM CHLORIDE, CALCIUM CHLORIDE 600; 310; 30; 20 MG/100ML; MG/100ML; MG/100ML; MG/100ML
INJECTION, SOLUTION INTRAVENOUS CONTINUOUS
Status: CANCELLED | OUTPATIENT
Start: 2020-08-24

## 2020-08-24 RX ORDER — SODIUM CHLORIDE 0.9 % (FLUSH) 0.9 %
10 SYRINGE (ML) INJECTION EVERY 12 HOURS SCHEDULED
Status: CANCELLED | OUTPATIENT
Start: 2020-08-24

## 2020-08-24 RX ORDER — MISOPROSTOL 100 UG/1
900 TABLET ORAL PRN
Status: CANCELLED | OUTPATIENT
Start: 2020-08-24

## 2020-08-24 NOTE — PROGRESS NOTES
gravid, soft, nontender         Results reviewed today:    No results found for this visit on 08/24/20. See prenatal vital sign section and fetal assessment section    ASSESSMENT & Plan    Diagnosis Orders   1. Encounter for supervision of other normal pregnancy in second trimester               I am having Shannon Laws maintain her acetaminophen, Prenatal MV-Min-Fe Fum-FA-DHA (PRENATAL 1 PO), ferrous sulfate, and cephALEXin. No follow-ups on file. There are no Patient Instructions on file for this visit.           Sydney Keyes,8/24/2020 11:16 AM

## 2020-08-25 ENCOUNTER — HOSPITAL ENCOUNTER (INPATIENT)
Age: 20
LOS: 1 days | Discharge: HOME | DRG: 560 | End: 2020-08-26
Attending: ADVANCED PRACTICE MIDWIFE | Admitting: ADVANCED PRACTICE MIDWIFE
Payer: COMMERCIAL

## 2020-08-25 ENCOUNTER — ANESTHESIA EVENT (OUTPATIENT)
Dept: LABOR AND DELIVERY | Age: 20
DRG: 560 | End: 2020-08-25
Payer: COMMERCIAL

## 2020-08-25 ENCOUNTER — ANESTHESIA (OUTPATIENT)
Dept: LABOR AND DELIVERY | Age: 20
DRG: 560 | End: 2020-08-25
Payer: COMMERCIAL

## 2020-08-25 PROBLEM — Z78.9 ADMITTED TO LABOR AND DELIVERY: Status: ACTIVE | Noted: 2020-08-25

## 2020-08-25 LAB
ABSOLUTE EOS #: 0.07 K/UL (ref 0–0.44)
ABSOLUTE IMMATURE GRANULOCYTE: 0.14 K/UL (ref 0–0.3)
ABSOLUTE LYMPH #: 2.34 K/UL (ref 1.1–3.7)
ABSOLUTE MONO #: 0.46 K/UL (ref 0.1–1.2)
AMPHETAMINE SCREEN URINE: NEGATIVE
BARBITURATE SCREEN URINE: NEGATIVE
BASOPHILS # BLD: 1 % (ref 0–2)
BASOPHILS ABSOLUTE: 0.07 K/UL (ref 0–0.2)
BENZODIAZEPINE SCREEN, URINE: NEGATIVE
BUPRENORPHINE URINE: NEGATIVE
CANNABINOID SCREEN URINE: NEGATIVE
COCAINE METABOLITE, URINE: NEGATIVE
DIFFERENTIAL TYPE: ABNORMAL
EOSINOPHILS RELATIVE PERCENT: 1 % (ref 1–4)
HCT VFR BLD CALC: 31.3 % (ref 36.3–47.1)
HEMOGLOBIN: 10 G/DL (ref 11.9–15.1)
IMMATURE GRANULOCYTES: 1 %
LYMPHOCYTES # BLD: 17 % (ref 24–43)
MCH RBC QN AUTO: 27 PG (ref 25.2–33.5)
MCHC RBC AUTO-ENTMCNC: 31.9 G/DL (ref 28.4–34.8)
MCV RBC AUTO: 84.4 FL (ref 82.6–102.9)
MDMA URINE: NORMAL
METHADONE SCREEN, URINE: NEGATIVE
METHAMPHETAMINE, URINE: NEGATIVE
MONOCYTES # BLD: 3 % (ref 3–12)
NRBC AUTOMATED: 0 PER 100 WBC
OPIATES, URINE: NEGATIVE
OXYCODONE SCREEN URINE: NEGATIVE
PDW BLD-RTO: 14.7 % (ref 11.8–14.4)
PHENCYCLIDINE, URINE: NEGATIVE
PLATELET # BLD: 338 K/UL (ref 138–453)
PLATELET ESTIMATE: ABNORMAL
PMV BLD AUTO: 10.5 FL (ref 8.1–13.5)
PROPOXYPHENE, URINE: NEGATIVE
RBC # BLD: 3.71 M/UL (ref 3.95–5.11)
RBC # BLD: ABNORMAL 10*6/UL
SEG NEUTROPHILS: 77 % (ref 36–65)
SEGMENTED NEUTROPHILS ABSOLUTE COUNT: 10.93 K/UL (ref 1.5–8.1)
TEST INFORMATION: NORMAL
TRICYCLIC ANTIDEPRESSANTS, UR: NEGATIVE
WBC # BLD: 14 K/UL (ref 3.5–11.3)
WBC # BLD: ABNORMAL 10*3/UL

## 2020-08-25 PROCEDURE — 3700000025 EPIDURAL BLOCK: Performed by: NURSE ANESTHETIST, CERTIFIED REGISTERED

## 2020-08-25 PROCEDURE — 85025 COMPLETE CBC W/AUTO DIFF WBC: CPT

## 2020-08-25 PROCEDURE — 88307 TISSUE EXAM BY PATHOLOGIST: CPT

## 2020-08-25 PROCEDURE — 59409 OBSTETRICAL CARE: CPT | Performed by: ADVANCED PRACTICE MIDWIFE

## 2020-08-25 PROCEDURE — 80306 DRUG TEST PRSMV INSTRMNT: CPT

## 2020-08-25 PROCEDURE — 6360000002 HC RX W HCPCS: Performed by: ADVANCED PRACTICE MIDWIFE

## 2020-08-25 PROCEDURE — 2500000003 HC RX 250 WO HCPCS: Performed by: NURSE ANESTHETIST, CERTIFIED REGISTERED

## 2020-08-25 PROCEDURE — 1220000000 HC SEMI PRIVATE OB R&B

## 2020-08-25 PROCEDURE — 2580000003 HC RX 258: Performed by: ADVANCED PRACTICE MIDWIFE

## 2020-08-25 PROCEDURE — 6360000002 HC RX W HCPCS: Performed by: NURSE ANESTHETIST, CERTIFIED REGISTERED

## 2020-08-25 RX ORDER — ACETAMINOPHEN 325 MG/1
650 TABLET ORAL EVERY 4 HOURS PRN
Status: DISCONTINUED | OUTPATIENT
Start: 2020-08-25 | End: 2020-08-26 | Stop reason: HOSPADM

## 2020-08-25 RX ORDER — METHYLERGONOVINE MALEATE 0.2 MG/ML
200 INJECTION INTRAVENOUS PRN
Status: DISCONTINUED | OUTPATIENT
Start: 2020-08-25 | End: 2020-08-26 | Stop reason: HOSPADM

## 2020-08-25 RX ORDER — CARBOPROST TROMETHAMINE 250 UG/ML
250 INJECTION, SOLUTION INTRAMUSCULAR PRN
Status: DISCONTINUED | OUTPATIENT
Start: 2020-08-25 | End: 2020-08-26 | Stop reason: HOSPADM

## 2020-08-25 RX ORDER — EPHEDRINE SULFATE/0.9% NACL/PF 50 MG/5 ML
5 SYRINGE (ML) INTRAVENOUS EVERY 5 MIN PRN
Status: DISCONTINUED | OUTPATIENT
Start: 2020-08-25 | End: 2020-08-26 | Stop reason: HOSPADM

## 2020-08-25 RX ORDER — SEVOFLURANE 250 ML/250ML
1 LIQUID RESPIRATORY (INHALATION) CONTINUOUS PRN
Status: DISCONTINUED | OUTPATIENT
Start: 2020-08-25 | End: 2020-08-26 | Stop reason: HOSPADM

## 2020-08-25 RX ORDER — LIDOCAINE HYDROCHLORIDE 10 MG/ML
30 INJECTION, SOLUTION EPIDURAL; INFILTRATION; INTRACAUDAL; PERINEURAL PRN
Status: DISCONTINUED | OUTPATIENT
Start: 2020-08-25 | End: 2020-08-26 | Stop reason: HOSPADM

## 2020-08-25 RX ORDER — ROPIVACAINE HYDROCHLORIDE 2 MG/ML
10 INJECTION, SOLUTION EPIDURAL; INFILTRATION; PERINEURAL CONTINUOUS
Status: DISCONTINUED | OUTPATIENT
Start: 2020-08-25 | End: 2020-08-26 | Stop reason: HOSPADM

## 2020-08-25 RX ORDER — SODIUM CHLORIDE 0.9 % (FLUSH) 0.9 %
10 SYRINGE (ML) INJECTION PRN
Status: DISCONTINUED | OUTPATIENT
Start: 2020-08-25 | End: 2020-08-26 | Stop reason: HOSPADM

## 2020-08-25 RX ORDER — SODIUM CHLORIDE, SODIUM LACTATE, POTASSIUM CHLORIDE, CALCIUM CHLORIDE 600; 310; 30; 20 MG/100ML; MG/100ML; MG/100ML; MG/100ML
INJECTION, SOLUTION INTRAVENOUS CONTINUOUS
Status: DISCONTINUED | OUTPATIENT
Start: 2020-08-25 | End: 2020-08-26 | Stop reason: HOSPADM

## 2020-08-25 RX ORDER — LIDOCAINE HYDROCHLORIDE 20 MG/ML
INJECTION, SOLUTION EPIDURAL; INFILTRATION; INTRACAUDAL; PERINEURAL PRN
Status: DISCONTINUED | OUTPATIENT
Start: 2020-08-25 | End: 2020-08-25 | Stop reason: SDUPTHER

## 2020-08-25 RX ORDER — ONDANSETRON 2 MG/ML
4 INJECTION INTRAMUSCULAR; INTRAVENOUS EVERY 6 HOURS PRN
Status: DISCONTINUED | OUTPATIENT
Start: 2020-08-25 | End: 2020-08-26 | Stop reason: HOSPADM

## 2020-08-25 RX ORDER — MISOPROSTOL 100 UG/1
900 TABLET ORAL PRN
Status: DISCONTINUED | OUTPATIENT
Start: 2020-08-25 | End: 2020-08-26 | Stop reason: HOSPADM

## 2020-08-25 RX ORDER — SODIUM CHLORIDE 0.9 % (FLUSH) 0.9 %
10 SYRINGE (ML) INJECTION EVERY 12 HOURS SCHEDULED
Status: DISCONTINUED | OUTPATIENT
Start: 2020-08-25 | End: 2020-08-26 | Stop reason: HOSPADM

## 2020-08-25 RX ORDER — ROPIVACAINE HYDROCHLORIDE 2 MG/ML
INJECTION, SOLUTION EPIDURAL; INFILTRATION; PERINEURAL CONTINUOUS PRN
Status: DISCONTINUED | OUTPATIENT
Start: 2020-08-25 | End: 2020-08-25 | Stop reason: SDUPTHER

## 2020-08-25 RX ORDER — FENTANYL CITRATE 50 UG/ML
25 INJECTION, SOLUTION INTRAMUSCULAR; INTRAVENOUS
Status: DISCONTINUED | OUTPATIENT
Start: 2020-08-25 | End: 2020-08-26 | Stop reason: HOSPADM

## 2020-08-25 RX ADMIN — BUTORPHANOL TARTRATE 1 MG: 2 INJECTION, SOLUTION INTRAMUSCULAR; INTRAVENOUS at 18:46

## 2020-08-25 RX ADMIN — SODIUM CHLORIDE, POTASSIUM CHLORIDE, SODIUM LACTATE AND CALCIUM CHLORIDE: 600; 310; 30; 20 INJECTION, SOLUTION INTRAVENOUS at 14:55

## 2020-08-25 RX ADMIN — ONDANSETRON 4 MG: 2 INJECTION INTRAMUSCULAR; INTRAVENOUS at 21:10

## 2020-08-25 RX ADMIN — LIDOCAINE HYDROCHLORIDE 2 ML: 20 INJECTION, SOLUTION EPIDURAL; INFILTRATION; INTRACAUDAL; PERINEURAL at 19:41

## 2020-08-25 RX ADMIN — LIDOCAINE HYDROCHLORIDE 2 ML: 20 INJECTION, SOLUTION EPIDURAL; INFILTRATION; INTRACAUDAL; PERINEURAL at 19:37

## 2020-08-25 RX ADMIN — OXYTOCIN 1 MILLI-UNITS/MIN: 10 INJECTION INTRAVENOUS at 14:59

## 2020-08-25 RX ADMIN — ROPIVACAINE HYDROCHLORIDE 10 ML/HR: 2 INJECTION, SOLUTION EPIDURAL; INFILTRATION at 19:42

## 2020-08-25 RX ADMIN — METHYLERGONOVINE MALEATE 200 MCG: 0.2 INJECTION INTRAVENOUS at 22:10

## 2020-08-25 RX ADMIN — SODIUM CHLORIDE, POTASSIUM CHLORIDE, SODIUM LACTATE AND CALCIUM CHLORIDE: 600; 310; 30; 20 INJECTION, SOLUTION INTRAVENOUS at 20:41

## 2020-08-25 RX ADMIN — SODIUM CHLORIDE, POTASSIUM CHLORIDE, SODIUM LACTATE AND CALCIUM CHLORIDE: 600; 310; 30; 20 INJECTION, SOLUTION INTRAVENOUS at 19:07

## 2020-08-25 RX ADMIN — OXYTOCIN 6 MILLI-UNITS/MIN: 10 INJECTION INTRAVENOUS at 20:45

## 2020-08-25 ASSESSMENT — PAIN DESCRIPTION - DESCRIPTORS
DESCRIPTORS: CRAMPING

## 2020-08-25 ASSESSMENT — PAIN SCALES - GENERAL: PAINLEVEL_OUTOF10: 6

## 2020-08-25 NOTE — H&P
Department of Obstetrics and Gynecology   Obstetrics History and Physical  Lauren CruzPrashant Jennifer De La Garza  H&P Admission Inpatient  Note        CHIEF COMPLAINT:  Term multip request for induction    HISTORY OF PRESENT ILLNESS:      The patient is a 23 y.o. female at 36w0d.   OB History        2    Para   1    Term   1            AB        Living   1       SAB        TAB        Ectopic        Molar        Multiple   0    Live Births   1            Patient presents with a chief complaint as above and is being admitted for induction    Estimated Due Date: Estimated Date of Delivery: 20    PRENATAL CARE:    Complicated by: none    PAST OB HISTORY:  OB History        2    Para   1    Term   1            AB        Living   1       SAB        TAB        Ectopic        Molar        Multiple   0    Live Births   1                Past Medical History:        Diagnosis Date    Asthma     pt has albuterol inhaler    Colitis     Colitis     Contusion of toe of right foot, subsequent encounter 2019    Depression     , on meds    Dysmenorrhea in the adolescent     Facial abscess     was cyst left cheek    Rectal bleed     Vertigo      Past Surgical History:        Procedure Laterality Date    ABSCESS DRAINAGE Left     once left cheek    COLONOSCOPY      COLONOSCOPY  2013        UPPER GASTROINTESTINAL ENDOSCOPY  2013         Allergies:  Soap and Silver    Social History:    Social History     Socioeconomic History    Marital status: Single     Spouse name: Not on file    Number of children: Not on file    Years of education: Not on file    Highest education level: Not on file   Occupational History    Not on file   Social Needs    Financial resource strain: Not on file    Food insecurity     Worry: Not on file     Inability: Not on file    Transportation needs     Medical: Not on file     Non-medical: Not on file   Tobacco Use    Smoking status: Current Every Day Smoker     Packs/day: 0.50     Years: 2.00     Pack years: 1.00     Types: Cigarettes    Smokeless tobacco: Never Used    Tobacco comment: trying to quit on own   Substance and Sexual Activity    Alcohol use: No     Alcohol/week: 0.0 standard drinks     Comment: occasionally with family, hardly ever    Drug use: No    Sexual activity: Yes     Partners: Male     Birth control/protection: Injection   Lifestyle    Physical activity     Days per week: Not on file     Minutes per session: Not on file    Stress: Not on file   Relationships    Social connections     Talks on phone: Not on file     Gets together: Not on file     Attends Muslim service: Not on file     Active member of club or organization: Not on file     Attends meetings of clubs or organizations: Not on file     Relationship status: Not on file    Intimate partner violence     Fear of current or ex partner: Not on file     Emotionally abused: Not on file     Physically abused: Not on file     Forced sexual activity: Not on file   Other Topics Concern    Not on file   Social History Narrative    Not on file     Family History:       Problem Relation Age of Onset    Allergies Mother     Crohn's Disease Mother     High Cholesterol Father     Migraines Father     Stroke Father     Heart Disease Father     Cancer Father     Diabetes Father     Cancer Maternal Grandmother     Cancer Maternal Grandfather     Migraines Brother     Cancer Paternal Grandmother      Medications Prior to Admission:  Medications Prior to Admission: cephALEXin (KEFLEX) 500 MG capsule, Take 500 mg by mouth 3 times daily  ferrous sulfate (IRON 325) 325 (65 Fe) MG tablet, Take 1 tablet by mouth daily (with breakfast)  Prenatal MV-Min-Fe Fum-FA-DHA (PRENATAL 1 PO), Take 1 tablet by mouth daily  acetaminophen (TYLENOL) 500 MG tablet, Take 500 mg by mouth every 6 hours as needed for Pain    REVIEW OF SYSTEMS:    CONSTITUTIONAL: negative  RESPIRATORY:  negative  CARDIOVASCULAR:  negative  GASTROINTESTINAL:  negative  ALLERGIC/IMMUNOLOGIC:  negative  NEUROLOGICAL:  negative  BEHAVIOR/PSYCH:  negative    PHYSICAL EXAM:  Vitals:    08/25/20 1456   BP: 115/61   Pulse: 96     General appearance:  awake, alert, cooperative, no apparent distress, and appears stated age  Neurologic:  Awake, alert, oriented to name, place and time. Lungs:  No increased work of breathing, good air exchange  Abdomen:  Soft, non tender, gravid, consistent with her gestational age, EFW by Leopald's manouever was 7 lbs   Fetal heart rate:  Reassuring. Pelvis:  Adequate pelvis  Cervix: 2 cm   Contraction frequency:  irregular minutes    Membranes:  Intact    Labs:   CBC:   Lab Results   Component Value Date    WBC 14.0 08/25/2020    RBC 3.71 08/25/2020    RBC 4.68 01/25/2012    HGB 10.0 08/25/2020    HCT 31.3 08/25/2020    MCV 84.4 08/25/2020    MCH 27.0 08/25/2020    MCHC 31.9 08/25/2020    RDW 14.7 08/25/2020     08/25/2020     01/25/2012    MPV 10.5 08/25/2020       ASSESSMENT AND PLAN:    Estimated length of stay: 2 days    Active Problems:   term multigravida      Labor: Admit, anticipate normal delivery, routine labor orders  Fetus: Reassuring, Cat 1  GBS: No  Other: pitocin and arom with consult Dr. Sahil Braun, anticipate Mario Rogers.  Keyes,CNM,8/25/2020 3:15 PM

## 2020-08-25 NOTE — FLOWSHEET NOTE
RN in room, monitor adjusted. Patient sitting straight up in bed and leaning over bedside table coloring. Encouraged patient to not lean over due to fetus not tracing on monitor. Pt verbalizes understanding. Also discussed bringing in birthing ball and made aware pt does not have to stay in bed can get up and ambulate around room as long as she's plugged into monitors. Pt verbalizes understanding.

## 2020-08-25 NOTE — FLOWSHEET NOTE
Pt requesting IV pain medication at this time also states she is going to want epidural when she can have it.

## 2020-08-25 NOTE — FLOWSHEET NOTE
Brendon Coon CNM in room at this time. Aware pitocin due to be increased right now, states leave it at 11mu for now.

## 2020-08-26 VITALS
RESPIRATION RATE: 16 BRPM | OXYGEN SATURATION: 100 % | DIASTOLIC BLOOD PRESSURE: 67 MMHG | TEMPERATURE: 98.7 F | BODY MASS INDEX: 35.5 KG/M2 | HEIGHT: 61 IN | HEART RATE: 71 BPM | SYSTOLIC BLOOD PRESSURE: 119 MMHG | WEIGHT: 188 LBS

## 2020-08-26 PROBLEM — Z78.9 ADMITTED TO LABOR AND DELIVERY: Status: RESOLVED | Noted: 2020-08-25 | Resolved: 2020-08-26

## 2020-08-26 PROBLEM — Z34.90 ENCOUNTER FOR SUPERVISION OF NORMAL PREGNANCY: Status: RESOLVED | Noted: 2020-02-14 | Resolved: 2020-08-26

## 2020-08-26 PROCEDURE — 36415 COLL VENOUS BLD VENIPUNCTURE: CPT

## 2020-08-26 PROCEDURE — 99024 POSTOP FOLLOW-UP VISIT: CPT | Performed by: ADVANCED PRACTICE MIDWIFE

## 2020-08-26 PROCEDURE — 86900 BLOOD TYPING SEROLOGIC ABO: CPT

## 2020-08-26 PROCEDURE — 86901 BLOOD TYPING SEROLOGIC RH(D): CPT

## 2020-08-26 PROCEDURE — 6370000000 HC RX 637 (ALT 250 FOR IP): Performed by: ADVANCED PRACTICE MIDWIFE

## 2020-08-26 PROCEDURE — 7200000001 HC VAGINAL DELIVERY

## 2020-08-26 PROCEDURE — 85461 HEMOGLOBIN FETAL: CPT

## 2020-08-26 PROCEDURE — 86850 RBC ANTIBODY SCREEN: CPT

## 2020-08-26 PROCEDURE — 6360000002 HC RX W HCPCS: Performed by: OBSTETRICS & GYNECOLOGY

## 2020-08-26 PROCEDURE — 96372 THER/PROPH/DIAG INJ SC/IM: CPT

## 2020-08-26 PROCEDURE — 6370000000 HC RX 637 (ALT 250 FOR IP): Performed by: OBSTETRICS & GYNECOLOGY

## 2020-08-26 RX ORDER — SERTRALINE HYDROCHLORIDE 25 MG/1
25 TABLET, FILM COATED ORAL DAILY
Qty: 30 TABLET | Refills: 0 | Status: SHIPPED | OUTPATIENT
Start: 2020-08-26 | End: 2020-12-30

## 2020-08-26 RX ORDER — IBUPROFEN 800 MG/1
800 TABLET ORAL EVERY 8 HOURS
Status: DISCONTINUED | OUTPATIENT
Start: 2020-08-26 | End: 2020-08-26 | Stop reason: HOSPADM

## 2020-08-26 RX ORDER — MODIFIED LANOLIN
OINTMENT (GRAM) TOPICAL PRN
Status: DISCONTINUED | OUTPATIENT
Start: 2020-08-26 | End: 2020-08-26 | Stop reason: HOSPADM

## 2020-08-26 RX ORDER — METHYLERGONOVINE MALEATE 0.2 MG/ML
200 INJECTION INTRAVENOUS
Status: DISCONTINUED | OUTPATIENT
Start: 2020-08-26 | End: 2020-08-26 | Stop reason: HOSPADM

## 2020-08-26 RX ORDER — SERTRALINE HYDROCHLORIDE 25 MG/1
25 TABLET, FILM COATED ORAL ONCE
Status: COMPLETED | OUTPATIENT
Start: 2020-08-26 | End: 2020-08-26

## 2020-08-26 RX ORDER — DOCUSATE SODIUM 100 MG/1
100 CAPSULE, LIQUID FILLED ORAL 2 TIMES DAILY PRN
Status: DISCONTINUED | OUTPATIENT
Start: 2020-08-26 | End: 2020-08-26 | Stop reason: HOSPADM

## 2020-08-26 RX ORDER — SODIUM CHLORIDE 0.9 % (FLUSH) 0.9 %
10 SYRINGE (ML) INJECTION PRN
Status: DISCONTINUED | OUTPATIENT
Start: 2020-08-26 | End: 2020-08-26 | Stop reason: HOSPADM

## 2020-08-26 RX ORDER — IBUPROFEN 800 MG/1
800 TABLET ORAL EVERY 8 HOURS PRN
Qty: 30 TABLET | Refills: 1 | Status: SHIPPED | OUTPATIENT
Start: 2020-08-26 | End: 2020-09-22

## 2020-08-26 RX ORDER — ACETAMINOPHEN 325 MG/1
650 TABLET ORAL EVERY 4 HOURS PRN
Status: DISCONTINUED | OUTPATIENT
Start: 2020-08-26 | End: 2020-08-26 | Stop reason: HOSPADM

## 2020-08-26 RX ORDER — SODIUM CHLORIDE 0.9 % (FLUSH) 0.9 %
10 SYRINGE (ML) INJECTION EVERY 12 HOURS SCHEDULED
Status: DISCONTINUED | OUTPATIENT
Start: 2020-08-26 | End: 2020-08-26 | Stop reason: HOSPADM

## 2020-08-26 RX ADMIN — IBUPROFEN 800 MG: 800 TABLET, FILM COATED ORAL at 00:41

## 2020-08-26 RX ADMIN — ACETAMINOPHEN 650 MG: 325 TABLET, FILM COATED ORAL at 12:19

## 2020-08-26 RX ADMIN — HUMAN RHO(D) IMMUNE GLOBULIN 300 MCG: 300 INJECTION, SOLUTION INTRAMUSCULAR at 10:47

## 2020-08-26 RX ADMIN — SERTRALINE HYDROCHLORIDE 25 MG: 25 TABLET ORAL at 17:47

## 2020-08-26 RX ADMIN — IBUPROFEN 800 MG: 800 TABLET, FILM COATED ORAL at 17:56

## 2020-08-26 RX ADMIN — IBUPROFEN 800 MG: 800 TABLET, FILM COATED ORAL at 10:11

## 2020-08-26 ASSESSMENT — PAIN SCALES - GENERAL
PAINLEVEL_OUTOF10: 4
PAINLEVEL_OUTOF10: 6
PAINLEVEL_OUTOF10: 5
PAINLEVEL_OUTOF10: 6

## 2020-08-26 NOTE — PROGRESS NOTES
Anesthesia notified via telephone that provider is requesting they come to department to be on stand-by. States they are in a surgery case.

## 2020-08-26 NOTE — DISCHARGE SUMMARY
Obstetrical Discharge Form        Gestational Age:39w0d    Antepartum complications: none    Date of Delivery: 20    Type of Delivery:        Delivered By:  Trinity FOREMAN CNM    Assisted By:Dr. Pradip Nunez: female    Anesthesia:  epidural      Intrapartum complications: prolonged deceleration at complete dilation    Feeding method: bottle - [place] Good Start    Blood type: A NEGATIVE      Rubella:    Rubella Antibody, IGG   Date Value Ref Range Status   2020 273.4 IU/mL Final     Comment:                 REFERENCE RANGE:  <5.0       NON-REACTIVE (non-immune)  5.0 TO 9.9 EQUIVOCAL  >=10.0     REACTIVE     (immune)             T. Pallidium, IGG:    T. pallidum, IgG   Date Value Ref Range Status   2020 NONREACTIVE NONREACTIVE Final     Comment:           T. pallidum antibodies are not detected. There is no serological evidence of infection with T. pallidum (early primary syphilis   cannot be excluded). Retest in 2-4 weeks if syphilis is clinically suspect.              Hepatitis B Surface Antigen:   Hepatitis B Surface Ag   Date Value Ref Range Status   2020 NONREACTIVE NONREACTIVE Final     HIV:  No results found for: [id number]    Results for orders placed or performed during the hospital encounter of 20   CBC auto differential   Result Value Ref Range    WBC 14.0 (H) 3.5 - 11.3 k/uL    RBC 3.71 (L) 3.95 - 5.11 m/uL    Hemoglobin 10.0 (L) 11.9 - 15.1 g/dL    Hematocrit 31.3 (L) 36.3 - 47.1 %    MCV 84.4 82.6 - 102.9 fL    MCH 27.0 25.2 - 33.5 pg    MCHC 31.9 28.4 - 34.8 g/dL    RDW 14.7 (H) 11.8 - 14.4 %    Platelets 215 697 - 518 k/uL    MPV 10.5 8.1 - 13.5 fL    NRBC Automated 0.0 0.0 per 100 WBC    Differential Type NOT REPORTED     Seg Neutrophils 77 (H) 36 - 65 %    Lymphocytes 17 (L) 24 - 43 %    Monocytes 3 3 - 12 %    Eosinophils % 1 1 - 4 %    Basophils 1 0 - 2 %    Immature Granulocytes 1 (H) 0 %    Segs Absolute 10.93 (H) 1.50 - 8.10 k/uL    Absolute Lymph # 2.34 1.10 - 3.70 k/uL    Absolute Mono # 0.46 0.10 - 1.20 k/uL    Absolute Eos # 0.07 0.00 - 0.44 k/uL    Basophils Absolute 0.07 0.00 - 0.20 k/uL    Absolute Immature Granulocyte 0.14 0.00 - 0.30 k/uL    WBC Morphology NOT REPORTED     RBC Morphology NOT REPORTED     Platelet Estimate NOT REPORTED    DRUG SCREEN MULTI URINE   Result Value Ref Range    Amphetamine Screen, Ur NEGATIVE NEGATIVE    Barbiturate Screen, Ur NEGATIVE NEGATIVE    Benzodiazepine Screen, Urine NEGATIVE NEGATIVE    Cocaine Metabolite, Urine NEGATIVE NEGATIVE    Methadone Screen, Urine NEGATIVE NEGATIVE    Opiates, Urine NEGATIVE NEGATIVE    Phencyclidine, Urine NEGATIVE NEGATIVE    Propoxyphene, Urine NEGATIVE NEGATIVE    Cannabinoid Scrn, Ur NEGATIVE NEGATIVE    Oxycodone Screen, Ur NEGATIVE NEGATIVE    Methamphetamine, Urine NEGATIVE NEGATIVE    Tricyclic Antidepressants, Urine NEGATIVE NEGATIVE    MDMA, Urine NOT REPORTED NEGATIVE    Buprenorphine Urine NEGATIVE NEGATIVE    Test Information NOT REPORTED          Postpartum complications: none    Discharge Medication:    Juan Del Real   Home Medication Instructions HUS:[id number]    Printed on:08/26/20 0822   Medication Information                      acetaminophen (TYLENOL) 500 MG tablet  Take 500 mg by mouth every 6 hours as needed for Pain             cephALEXin (KEFLEX) 500 MG capsule  Take 500 mg by mouth 3 times daily             ferrous sulfate (IRON 325) 325 (65 Fe) MG tablet  Take 1 tablet by mouth daily (with breakfast)             Prenatal MV-Min-Fe Fum-FA-DHA (PRENATAL 1 PO)  Take 1 tablet by mouth daily                    Admit date: 8/25/2020  2:00 PM    Discharge Date: 8/26/2020    Discharged to: Home in stable condition        Plan:   Follow up 2 and 6 weeks postpartum visits full weight-bearing

## 2020-08-26 NOTE — ANESTHESIA POSTPROCEDURE EVALUATION
Department of Anesthesiology  Postprocedure Note    Patient: Bobby Diallo  MRN: [id number]  [place]: 2000  Date of evaluation: 8/26/2020  Time:  10:08 AM     Procedure Summary     Date:  08/25/20 Room / Location:      Anesthesia Start:  1925 Anesthesia Stop:  2152    Procedure:  Labor Analgesia Diagnosis:      Scheduled Providers:   Responsible Provider:  AHSAN Arboleda CRNA    Anesthesia Type:  epidural ASA Status:  2          Anesthesia Type: No value filed. Jonelle Phase I:      Jonelle Phase II:      Last vitals: Reviewed and per EMR flowsheets.        Anesthesia Post Evaluation    Patient location during evaluation: bedside  Patient participation: complete - patient participated  Level of consciousness: awake and alert  Airway patency: patent  Nausea & Vomiting: no nausea and no vomiting  Complications: no  Cardiovascular status: hemodynamically stable  Respiratory status: acceptable and room air  Hydration status: stable  Comments: No c/o's, Doing well

## 2020-08-26 NOTE — PROGRESS NOTES
CTSP per CNM for decel with pushing and complete with adequate return to baseline but requested presence at hospital.  Patient proceeded to push effectively with epidural anethesia to delivery of live vigorous female infant with good respiratory effort and tone and apgars 8 and 8 at 1 and 5 minutes respectively. Mother and infant stable.   Babar Lazaro MD [facility]

## 2020-08-26 NOTE — PROGRESS NOTES
Department of Obstetrics and Gynecology  Labor and Delivery       Post Partum Progress Note             SUBJECTIVE:  1st day postpartum, denies c/o, requests early discharge to care for other toddler at home    OBJECTIVE:      Vitals:  BP (!) 109/55   Pulse 82   Temp 98.3 °F (36.8 °C) (Oral)   Resp 16   Ht 5' 1\" (1.549 m)   Wt 188 lb (85.3 kg)   LMP  (LMP Unknown)   SpO2 100%   Breastfeeding Unknown   BMI 35.52 kg/m²   Patient Vitals for the past 24 hrs:   BP Temp Temp src Pulse Resp SpO2 Height Weight   08/26/20 0418 (!) 109/55 98.3 °F (36.8 °C) Oral 82 16 -- -- --   08/26/20 0026 114/62 -- -- 75 -- -- -- --   08/26/20 0011 (!) 105/55 -- -- 70 -- -- -- --   08/25/20 2357 (!) 106/56 -- -- 69 -- -- -- --   08/25/20 2342 122/63 -- -- 65 -- -- -- --   08/25/20 2327 124/66 -- -- 73 -- -- -- --   08/25/20 2312 (!) 116/56 -- -- 59 -- -- -- --   08/25/20 2301 -- 97.8 °F (36.6 °C) Oral -- -- -- -- --   08/25/20 2258 (!) 115/49 -- -- 67 -- -- -- --   08/25/20 2242 119/67 -- -- 71 -- -- -- --   08/25/20 2231 (!) 111/56 -- -- 66 -- -- -- --   08/25/20 2159 107/72 -- -- 81 -- -- -- --   08/25/20 2153 -- -- -- -- -- 100 % -- --   08/25/20 2148 -- -- -- -- -- 100 % -- --   08/25/20 2146 (!) 104/54 -- -- 68 -- -- -- --   08/25/20 2145 (!) 104/54 -- -- 68 -- -- -- --   08/25/20 2143 -- -- -- -- -- 100 % -- --   08/25/20 2138 -- -- -- -- -- 100 % -- --   08/25/20 2133 -- -- -- -- -- 100 % -- --   08/25/20 2128 -- -- -- -- -- 98 % -- --   08/25/20 2123 -- -- -- -- -- 99 % -- --   08/25/20 2118 -- -- -- -- -- 98 % -- --   08/25/20 2115 115/61 -- -- 78 -- -- -- --   08/25/20 2113 -- -- -- -- -- 98 % -- --   08/25/20 2112 -- 97.3 °F (36.3 °C) Oral -- -- -- -- --   08/25/20 2108 -- -- -- -- -- 97 % -- --   08/25/20 2103 -- -- -- -- -- 97 % -- --   08/25/20 2058 100/61 -- -- 65 -- 97 % -- --   08/25/20 2053 -- -- -- -- -- 98 % -- --   08/25/20 2048 -- -- -- -- -- 98 % -- --   08/25/20 2043 (!) 96/55 -- -- 76 -- 98 % -- -- 08/25/20 2038 -- -- -- -- -- 98 % -- --   08/25/20 2036 -- -- -- -- -- 94 % -- --   08/25/20 2033 -- -- -- -- -- 99 % -- --   08/25/20 2028 (!) 103/57 -- -- 79 -- 96 % -- --   08/25/20 2023 -- -- -- -- -- 96 % -- --   08/25/20 2018 -- -- -- -- -- 96 % -- --   08/25/20 2017 -- -- -- -- -- 96 % -- --   08/25/20 2015 (!) 96/53 -- -- 85 -- -- -- --   08/25/20 2013 -- -- -- -- -- 96 % -- --   08/25/20 2008 -- -- -- -- -- 96 % -- --   08/25/20 2003 -- -- -- -- -- 99 % -- --   08/25/20 1958 110/62 -- -- 84 -- 99 % -- --   08/25/20 1953 (!) 117/57 -- -- 91 -- 99 % -- --   08/25/20 1950 (!) 103/56 -- -- 90 -- -- -- --   08/25/20 1948 (!) 103/56 -- -- 90 -- 97 % -- --   08/25/20 1942 102/60 -- -- 80 -- -- -- --   08/25/20 1940 (!) 103/57 -- -- 96 -- -- -- --   08/25/20 1938 127/60 -- -- 89 -- -- -- --   08/25/20 1936 (!) 116/57 -- -- 75 -- -- -- --   08/25/20 1935 137/63 -- -- 77 -- -- -- --   08/25/20 1932 123/61 -- -- 71 -- -- -- --   08/25/20 1930 132/72 -- -- 85 -- -- -- --   08/25/20 1927 -- -- -- -- -- 100 % -- --   08/25/20 1922 -- -- -- -- -- 99 % -- --   08/25/20 1917 -- 97.4 °F (36.3 °C) Oral -- 22 98 % -- --   08/25/20 1849 116/71 -- -- 90 -- -- -- --   08/25/20 1734 (!) 108/58 -- -- 76 16 -- -- --   08/25/20 1630 (!) 104/55 -- -- 83 18 -- -- --   08/25/20 1538 -- -- -- -- -- -- 5' 1\" (1.549 m) 188 lb (85.3 kg)   08/25/20 1530 (!) 106/53 -- -- 98 16 -- -- --   08/25/20 1456 115/61 97.8 °F (36.6 °C) Oral 96 16 -- -- --         ABDOMEN: normal shape, position and consistency  GENITAL/URINARY:  External Genitalia:  General appearance; normal, Hair distribution; normal, Lesions absent  Uterus:  Size normal, Tenderness absent  Breast:normal appearance, no masses or tenderness  Cor: RRR no Murmurs  Pulmonary: clear to auscultation anterior and posterior  Extremities: no Clubbing cyanosis or ecchymosis    DATA:    CBC:   Lab Results   Component Value Date    WBC 14.0 08/25/2020    RBC 3.71 08/25/2020    RBC 4.68 01/25/2012    HGB 10.0 08/25/2020    HCT 31.3 08/25/2020    MCV 84.4 08/25/2020    MCH 27.0 08/25/2020    MCHC 31.9 08/25/2020    RDW 14.7 08/25/2020     08/25/2020     01/25/2012    MPV 10.5 08/25/2020         ASSESSMENT :      Active Problems:    Admitted to labor and delivery  Plan: delivered    Normal delivery          Plan:  D/c home, rto 2 and 6 weeks postpartum

## 2020-08-26 NOTE — PROGRESS NOTES
Dr Claudia Randhawa notified that provider is requesting she head to hospital. States she is coming in.

## 2020-08-26 NOTE — FLOWSHEET NOTE
Vincent Urbina CNM called and informed that patient is extremely anxious and states that she feels like the room is caving in on her and really would like to be discharged. There are orders for discharge in but informed Vincent Urbina CNM that patient states she has a history of depression and anxiety and is requesting to be started on something. Patient states she wants to take Zoloft as she took this in the past.  Orders received at this time for Zoloft 25mg.

## 2020-08-26 NOTE — FLOWSHEET NOTE
Patient is anxious and really wanting to be discharged but  will not be discharged until tomorrow morning. Offered patient a nicotine patch and patient states that she is okay with not having it but would really like to get out of the room. Reviewed history with patient of anxiety and depression. Patient states she does have a history of depression before getting pregnant with her 1st/last baby but stopped taking medication during that pregnancy and didn't need anything postpartum.   Patient states she would like to be started on Zoloft because she took this in the past.  Informed will review with Cuate Kraft CNM

## 2020-08-26 NOTE — ANESTHESIA PRE PROCEDURE
Department of Anesthesiology  Preprocedure Note       Name:  Ace Mccracken   Age:  23 y.o.  :  2000                                          MRN:  [id number]         Date:  2020      Surgeon: * No surgeons listed *    Procedure: * No procedures listed *    Medications prior to admission:   Prior to Admission medications    Medication Sig Start Date End Date Taking?  Authorizing Provider   cephALEXin (KEFLEX) 500 MG capsule Take 500 mg by mouth 3 times daily    Historical Provider, MD   ferrous sulfate (IRON 325) 325 (65 Fe) MG tablet Take 1 tablet by mouth daily (with breakfast) 20   Wilma Jenkins APRN - MARCELINO   Prenatal MV-Min-Fe Fum-FA-DHA (PRENATAL 1 PO) Take 1 tablet by mouth daily    Historical Provider, MD   acetaminophen (TYLENOL) 500 MG tablet Take 500 mg by mouth every 6 hours as needed for Pain    Historical Provider, MD       Current medications:    Current Facility-Administered Medications   Medication Dose Route Frequency Provider Last Rate Last Dose    acetaminophen (TYLENOL) tablet 650 mg  650 mg Oral Q4H PRN Wilma Jenkins, APRN - CNM        benzocaine-menthol (DERMOPLAST) 20-0.5 % spray   Topical PRN Wilma Jenkins, APRN - CNM        carboprost (HEMABATE) injection 250 mcg  250 mcg Intramuscular PRN Wilma Jenkins, APRN - CNM        methylergonovine (METHERGINE) injection 200 mcg  200 mcg Intramuscular PRN Wilma Jenkins, APRN - CNM        miSOPROStol (CYTOTEC) tablet 900 mcg  900 mcg Rectal PRN Wilma Jenkins, APRN - CNM        oxytocin (PITOCIN) 30 Units in sodium chloride 0.9 % 500 mL infusion  1 jaz-units/min Intravenous Continuous PRN Wilma Jenkins, APRN - CNM        witch hazel-glycerin (TUCKS) pad   Topical PRN Wilma Jenkins, APRN - CNM        fentaNYL (SUBLIMAZE) injection 25 mcg  25 mcg Intravenous Q1H PRN Wilma Jenkins, APRN - CNM        lactated ringers infusion   Intravenous Continuous Leanor Naas Melissa Ill, APRN -  mL/hr at 08/25/20 2000      lidocaine PF 1 % injection 30 mL  30 mL Other PRN  Javier, APRN - CNM        nitrous oxide 50% inhalation 1 each  1 each Inhalation Continuous PRN  [place], APRN - CNM        ondansetron [facility]) injection 4 mg  4 mg Intravenous Q6H PRN  Phoenix APRN - CNM        oxytocin (PITOCIN) 30 Units in sodium chloride 0.9 % 500 mL infusion  1 jaz-units/min Intravenous Continuous  [place], APRN - CNM   Stopped at 08/25/20 2005    sodium chloride flush 0.9 % injection 10 mL  10 mL Intravenous 2 times per day  Javier, APRN - CNM        sodium chloride flush 0.9 % injection 10 mL  10 mL Intravenous PRN  [place], APRN - CNM        butorphanol (STADOL) injection 1 mg  1 mg Intravenous Q3H PRN  Javier, APRN - CNM   1 mg at 08/25/20 1846       Allergies: Allergies   Allergen Reactions    Soap      Certain brands    Silver Rash     \"petra silver\"       Problem List:    Patient Active Problem List   Diagnosis Code    Family history of Crohn's disease Z80.78    Tobacco abuse Z72.0    Colitis K52.9    H/O: depression Z80.58    Smoking (tobacco) complicating pregnancy, second trimester O99.332    Contusion of toe of right foot, subsequent encounter S90.121D    Other cause of strike by thrown, projected or falling object, initial encounter W20. 8XXA    Encounter for supervision of normal pregnancy Z34.90    Admitted to labor and delivery Z78.9       Past Medical History:        Diagnosis Date    Asthma     pt has albuterol inhaler    Colitis     Colitis     Depression     2011, on meds    Dysmenorrhea in the adolescent     Facial abscess     was cyst left cheek    Rectal bleed     Vertigo        Past Surgical History:        Procedure Laterality Date    ABSCESS DRAINAGE Left 2011    once left cheek    COLONOSCOPY      COLONOSCOPY  12/05/2013    Dr.Naddaf Shirley [facility] GASTROINTESTINAL ENDOSCOPY  12/05/2013           Social History:    Social History     Tobacco Use    Smoking status: Current Every Day Smoker     Packs/day: 0.50     Years: 2.00     Pack years: 1.00     Types: Cigarettes    Smokeless tobacco: Never Used    Tobacco comment: trying to quit on own   Substance Use Topics    Alcohol use: No     Alcohol/week: 0.0 standard drinks     Comment: occasionally with family, hardly ever                                Ready to quit: Not Answered  Counseling given: Not Answered  Comment: trying to quit on own      Vital Signs (Current):   Vitals:    08/25/20 1948 08/25/20 1950 08/25/20 1953 08/25/20 1958   BP: (!) 103/56 (!) 103/56 (!) 117/57 110/62   Pulse: 90 90 91 84   Resp:       Temp:       TempSrc:       SpO2:       Weight:       Height:                                                  BP Readings from Last 3 Encounters:   08/25/20 110/62   08/24/20 130/80   08/17/20 118/74       NPO Status: Time of last liquid consumption: 1400                        Time of last solid consumption: 1400                        Date of last liquid consumption: 08/25/20                        Date of last solid food consumption: 08/25/20    BMI:   Wt Readings from Last 3 Encounters:   08/25/20 188 lb (85.3 kg)   08/24/20 188 lb 9.6 oz (85.5 kg)   08/17/20 186 lb 12.8 oz (84.7 kg)     Body mass index is 35.52 kg/m².     CBC:   Lab Results   Component Value Date    WBC 14.0 08/25/2020    RBC 3.71 08/25/2020    RBC 4.68 01/25/2012    HGB 10.0 08/25/2020    HCT 31.3 08/25/2020    MCV 84.4 08/25/2020    RDW 14.7 08/25/2020     08/25/2020     01/25/2012       CMP:   Lab Results   Component Value Date     03/16/2020    K 3.6 03/16/2020     03/16/2020    CO2 18 03/16/2020    BUN 4 03/16/2020    CREATININE 0.33 03/16/2020    GFRAA >60 03/16/2020    LABGLOM >60 03/16/2020    GLUCOSE 178 06/11/2020    GLUCOSE 77 01/25/2012    PROT 6.2 03/03/2020    CALCIUM 8.6 03/16/2020    BILITOT <0.10 03/03/2020    ALKPHOS 99 03/03/2020    AST 8 03/03/2020    ALT 7 03/03/2020       POC Tests: No results for input(s): POCGLU, POCNA, POCK, POCCL, POCBUN, POCHEMO, POCHCT in the last 72 hours. Coags:   Lab Results   Component Value Date    PROTIME 9.4 10/10/2018    INR 0.9 10/10/2018    APTT 25.7 10/10/2018       HCG (If Applicable):   Lab Results   Component Value Date    PREGTESTUR POSITIVE (A) 01/06/2020    HCG NEGATIVE 12/05/2013    HCGQUANT 93,206 (H) 03/03/2020        ABGs: No results found for: PHART, PO2ART, YHR4WZY, TYF3RBO, BEART, M0ATLHEO     Type & Screen (If Applicable):  No results found for: LABABO, LABRH    Drug/Infectious Status (If Applicable):  Lab Results   Component Value Date    HEPCAB NONREACTIVE 02/20/2020       COVID-19 Screening (If Applicable): No results found for: COVID19      Anesthesia Evaluation   no history of anesthetic complications:   Airway: Mallampati: II  TM distance: >3 FB   Neck ROM: full  Mouth opening: > = 3 FB Dental: normal exam         Pulmonary:normal exam    (+) asthma:                            Cardiovascular:                      Neuro/Psych:   (+) psychiatric history:            GI/Hepatic/Renal:             Endo/Other: Negative Endo/Other ROS                    Abdominal:           Vascular: negative vascular ROS. Anesthesia Plan      epidural     ASA 2             Anesthetic plan and risks discussed with patient.                       Nicole Matthews APRN - CRNA   8/25/2020

## 2020-08-27 LAB — SURGICAL PATHOLOGY REPORT: NORMAL

## 2020-08-28 LAB
ABO/RH: NORMAL
ANTIBODY SCREEN: NEGATIVE
BLD PROD TYP BPU: NORMAL
BLD PROD TYP BPU: NORMAL
DISPENSE STATUS BLOOD BANK: NORMAL
DU ANTIGEN: NORMAL
FETAL ROSETTE: NEGATIVE
HISTORY CHECK: NORMAL
Lab: 1
RHIG ELIGIBILITY: NORMAL
TRANSFUSION STATUS: NORMAL
UNIT DIVISION: 0
UNIT NUMBER: NORMAL

## 2020-08-31 ENCOUNTER — TELEPHONE (OUTPATIENT)
Dept: OBGYN | Age: 20
End: 2020-08-31

## 2020-08-31 NOTE — TELEPHONE ENCOUNTER
Patient calls with concerns, patient had delivery on 08/25/2020, started with chest pain radiates up chest, difficulty breathing at times. At times feels that someone is sitting on chest. I advised patient to proceed to ED for further evaluation however she declined and wanted me to discuss with you.

## 2020-09-22 ENCOUNTER — HOSPITAL ENCOUNTER (EMERGENCY)
Age: 20
Discharge: HOME | End: 2020-09-22
Attending: EMERGENCY MEDICINE
Payer: COMMERCIAL

## 2020-09-22 ENCOUNTER — APPOINTMENT (OUTPATIENT)
Dept: CT IMAGING | Age: 20
End: 2020-09-22
Payer: COMMERCIAL

## 2020-09-22 VITALS
BODY MASS INDEX: 30.23 KG/M2 | OXYGEN SATURATION: 100 % | WEIGHT: 160 LBS | TEMPERATURE: 100.1 F | HEART RATE: 98 BPM | RESPIRATION RATE: 18 BRPM | SYSTOLIC BLOOD PRESSURE: 90 MMHG | DIASTOLIC BLOOD PRESSURE: 72 MMHG

## 2020-09-22 LAB
-: ABNORMAL
ALBUMIN SERPL-MCNC: 3.9 G/DL (ref 3.5–5.2)
ALBUMIN/GLOBULIN RATIO: 1.5 (ref 1–2.5)
ALP BLD-CCNC: 114 U/L (ref 35–104)
ALT SERPL-CCNC: <5 U/L (ref 5–33)
AMORPHOUS: ABNORMAL
ANION GAP SERPL CALCULATED.3IONS-SCNC: 9 MMOL/L (ref 9–17)
AST SERPL-CCNC: 10 U/L
BACTERIA: ABNORMAL
BILIRUB SERPL-MCNC: 0.39 MG/DL (ref 0.3–1.2)
BILIRUBIN DIRECT: <0.08 MG/DL
BILIRUBIN URINE: NEGATIVE
BILIRUBIN, INDIRECT: ABNORMAL MG/DL (ref 0–1)
BUN BLDV-MCNC: 6 MG/DL (ref 6–20)
BUN/CREAT BLD: 9 (ref 9–20)
CALCIUM SERPL-MCNC: 9.2 MG/DL (ref 8.6–10.4)
CASTS UA: ABNORMAL /LPF
CHLORIDE BLD-SCNC: 106 MMOL/L (ref 98–107)
CO2: 24 MMOL/L (ref 20–31)
COLOR: YELLOW
COMMENT UA: ABNORMAL
CREAT SERPL-MCNC: 0.67 MG/DL (ref 0.5–0.9)
CRYSTALS, UA: ABNORMAL /HPF
EPITHELIAL CELLS UA: ABNORMAL /HPF (ref 0–25)
GFR AFRICAN AMERICAN: >60 ML/MIN
GFR NON-AFRICAN AMERICAN: >60 ML/MIN
GFR SERPL CREATININE-BSD FRML MDRD: NORMAL ML/MIN/{1.73_M2}
GFR SERPL CREATININE-BSD FRML MDRD: NORMAL ML/MIN/{1.73_M2}
GLOBULIN: ABNORMAL G/DL (ref 1.5–3.8)
GLUCOSE BLD-MCNC: 89 MG/DL (ref 70–99)
GLUCOSE URINE: NEGATIVE
HCG QUALITATIVE: NEGATIVE
HCT VFR BLD CALC: 36.1 % (ref 36.3–47.1)
HEMOGLOBIN: 11.1 G/DL (ref 11.9–15.1)
KETONES, URINE: NEGATIVE
LACTIC ACID, WHOLE BLOOD: NORMAL MMOL/L (ref 0.7–2.1)
LACTIC ACID: 0.5 MMOL/L (ref 0.5–2.2)
LEUKOCYTE ESTERASE, URINE: ABNORMAL
LIPASE: 14 U/L (ref 13–60)
MCH RBC QN AUTO: 25.6 PG (ref 25.2–33.5)
MCHC RBC AUTO-ENTMCNC: 30.7 G/DL (ref 28.4–34.8)
MCV RBC AUTO: 83.4 FL (ref 82.6–102.9)
MUCUS: ABNORMAL
NITRITE, URINE: NEGATIVE
NRBC AUTOMATED: 0 PER 100 WBC
OTHER OBSERVATIONS UA: ABNORMAL
PDW BLD-RTO: 14.6 % (ref 11.8–14.4)
PH UA: 6 (ref 5–9)
PLATELET # BLD: 396 K/UL (ref 138–453)
PMV BLD AUTO: 10.1 FL (ref 8.1–13.5)
POTASSIUM SERPL-SCNC: 3.7 MMOL/L (ref 3.7–5.3)
PROTEIN UA: ABNORMAL
RBC # BLD: 4.33 M/UL (ref 3.95–5.11)
RBC UA: ABNORMAL /HPF (ref 0–2)
RENAL EPITHELIAL, UA: ABNORMAL /HPF
SODIUM BLD-SCNC: 139 MMOL/L (ref 135–144)
SPECIFIC GRAVITY UA: 1.02 (ref 1.01–1.02)
TOTAL PROTEIN: 6.5 G/DL (ref 6.4–8.3)
TRICHOMONAS: ABNORMAL
TURBIDITY: ABNORMAL
URINE HGB: ABNORMAL
UROBILINOGEN, URINE: NORMAL
WBC # BLD: 14.7 K/UL (ref 3.5–11.3)
WBC UA: ABNORMAL /HPF (ref 0–5)
YEAST: ABNORMAL

## 2020-09-22 PROCEDURE — 6370000000 HC RX 637 (ALT 250 FOR IP): Performed by: EMERGENCY MEDICINE

## 2020-09-22 PROCEDURE — 36415 COLL VENOUS BLD VENIPUNCTURE: CPT

## 2020-09-22 PROCEDURE — 2580000003 HC RX 258: Performed by: EMERGENCY MEDICINE

## 2020-09-22 PROCEDURE — 85027 COMPLETE CBC AUTOMATED: CPT

## 2020-09-22 PROCEDURE — 87186 SC STD MICRODIL/AGAR DIL: CPT

## 2020-09-22 PROCEDURE — 80076 HEPATIC FUNCTION PANEL: CPT

## 2020-09-22 PROCEDURE — 99284 EMERGENCY DEPT VISIT MOD MDM: CPT

## 2020-09-22 PROCEDURE — 80048 BASIC METABOLIC PNL TOTAL CA: CPT

## 2020-09-22 PROCEDURE — 6360000002 HC RX W HCPCS: Performed by: EMERGENCY MEDICINE

## 2020-09-22 PROCEDURE — 87086 URINE CULTURE/COLONY COUNT: CPT

## 2020-09-22 PROCEDURE — 83690 ASSAY OF LIPASE: CPT

## 2020-09-22 PROCEDURE — 84703 CHORIONIC GONADOTROPIN ASSAY: CPT

## 2020-09-22 PROCEDURE — 74176 CT ABD & PELVIS W/O CONTRAST: CPT

## 2020-09-22 PROCEDURE — 83605 ASSAY OF LACTIC ACID: CPT

## 2020-09-22 PROCEDURE — 81001 URINALYSIS AUTO W/SCOPE: CPT

## 2020-09-22 PROCEDURE — 87088 URINE BACTERIA CULTURE: CPT

## 2020-09-22 PROCEDURE — 96374 THER/PROPH/DIAG INJ IV PUSH: CPT

## 2020-09-22 RX ORDER — CEPHALEXIN 500 MG/1
500 CAPSULE ORAL ONCE
Status: COMPLETED | OUTPATIENT
Start: 2020-09-22 | End: 2020-09-22

## 2020-09-22 RX ORDER — IBUPROFEN 800 MG/1
800 TABLET ORAL EVERY 8 HOURS PRN
Qty: 21 TABLET | Refills: 0 | Status: SHIPPED | OUTPATIENT
Start: 2020-09-22 | End: 2020-12-30

## 2020-09-22 RX ORDER — CEPHALEXIN 500 MG/1
500 CAPSULE ORAL 3 TIMES DAILY
Qty: 28 CAPSULE | Refills: 0 | Status: SHIPPED | OUTPATIENT
Start: 2020-09-22 | End: 2020-09-29

## 2020-09-22 RX ORDER — 0.9 % SODIUM CHLORIDE 0.9 %
1000 INTRAVENOUS SOLUTION INTRAVENOUS ONCE
Status: COMPLETED | OUTPATIENT
Start: 2020-09-22 | End: 2020-09-22

## 2020-09-22 RX ORDER — KETOROLAC TROMETHAMINE 15 MG/ML
30 INJECTION, SOLUTION INTRAMUSCULAR; INTRAVENOUS ONCE
Status: COMPLETED | OUTPATIENT
Start: 2020-09-22 | End: 2020-09-22

## 2020-09-22 RX ADMIN — KETOROLAC TROMETHAMINE 30 MG: 15 INJECTION, SOLUTION INTRAMUSCULAR; INTRAVENOUS at 17:03

## 2020-09-22 RX ADMIN — CEPHALEXIN 500 MG: 500 CAPSULE ORAL at 18:56

## 2020-09-22 RX ADMIN — SODIUM CHLORIDE 1000 ML: 9 INJECTION, SOLUTION INTRAVENOUS at 17:03

## 2020-09-22 ASSESSMENT — ENCOUNTER SYMPTOMS
RHINORRHEA: 0
SORE THROAT: 0
COUGH: 0
ABDOMINAL PAIN: 1
SHORTNESS OF BREATH: 0
NAUSEA: 0
DIARRHEA: 0
CONSTIPATION: 0
WHEEZING: 0
ABDOMINAL DISTENTION: 0
VOMITING: 0

## 2020-09-22 ASSESSMENT — PAIN SCALES - GENERAL
PAINLEVEL_OUTOF10: 10
PAINLEVEL_OUTOF10: 10
PAINLEVEL_OUTOF10: 2

## 2020-09-22 ASSESSMENT — PAIN DESCRIPTION - LOCATION
LOCATION: FLANK
LOCATION: FLANK

## 2020-09-22 ASSESSMENT — PAIN DESCRIPTION - DESCRIPTORS
DESCRIPTORS: ACHING
DESCRIPTORS: SHARP

## 2020-09-22 ASSESSMENT — PAIN DESCRIPTION - FREQUENCY
FREQUENCY: INTERMITTENT
FREQUENCY: CONTINUOUS

## 2020-09-22 ASSESSMENT — PAIN DESCRIPTION - PAIN TYPE
TYPE: ACUTE PAIN
TYPE: ACUTE PAIN

## 2020-09-22 ASSESSMENT — PAIN DESCRIPTION - ORIENTATION
ORIENTATION: RIGHT
ORIENTATION: RIGHT

## 2020-09-22 NOTE — ED NOTES
[facility]. Pt states understanding.       Rose Salgado RN  09/22/20 [id number]

## 2020-09-22 NOTE — ED PROVIDER NOTES
[facility] ED  Emergency Department        Pt Name: Sarah Rhodes  MRN: [id number]  [place] 2000  Date of evaluation: 9/22/20    CHIEF COMPLAINT       Chief Complaint   Patient presents with    Flank Pain     right sided, onset for a couple days    Dysuria       HISTORY OF PRESENT ILLNESS  (Location/Symptom, Timing/Onset, Context/Setting, Quality, Duration, ModifyingFactors, Severity.)      Sarah Rhodes is a 23 y.o. female who presents with into her bilateral upper back worse to her right flank. Ongoing for the past 2 days, she tried pain medicine yesterday but has not had anything today decreased appetite but no nausea or vomiting, she is 4 weeks postpartum no bleeding, no cramping, no fevers. Does report increased frequency of urination, denies any hematuria no previous history of kidney stones. PAST MEDICAL / SURGICAL / SOCIAL / FAMILY HISTORY      has a past medical history of Asthma, Colitis, Colitis, Depression, Dysmenorrhea in the adolescent, Facial abscess, Rectal bleed, and Vertigo. has a past surgical history that includes Abscess Drainage (Left, 2011); Colonoscopy; Upper gastrointestinal endoscopy (12/05/2013); and Colonoscopy (12/05/2013). Social History     Socioeconomic History    Marital status: Single     Spouse name: Not on file    Number of children: Not on file    Years of education: Not on file    Highest education level: Not on file   Occupational History    Not on file   Social Needs    Financial resource strain: Not on file    Food insecurity     Worry: Not on file     Inability: Not on file    Transportation needs     Medical: Not on file     Non-medical: Not on file   Tobacco Use    Smoking status: Current Every Day Smoker     Packs/day: 0.50     Years: 2.00     Pack years: 1.00     Types: Cigarettes    Smokeless tobacco: Never Used    Tobacco comment: trying to quit on own   Substance and Sexual Activity    Alcohol use:  No Alcohol/week: 0.0 standard drinks     Comment: occasionally with family, hardly ever    Drug use: No    Sexual activity: Yes     Partners: Male     Birth control/protection: Injection   Lifestyle    Physical activity     Days per week: Not on file     Minutes per session: Not on file    Stress: Not on file   Relationships    Social connections     Talks on phone: Not on file     Gets together: Not on file     Attends Caodaism service: Not on file     Active member of club or organization: Not on file     Attends meetings of clubs or organizations: Not on file     Relationship status: Not on file    Intimate partner violence     Fear of current or ex partner: Not on file     Emotionally abused: Not on file     Physically abused: Not on file     Forced sexual activity: Not on file   Other Topics Concern    Not on file   Social History Narrative    Not on file       Family History   Problem Relation Age of Onset    Allergies Mother     Crohn's Disease Mother     High Cholesterol Father     Migraines Father     Stroke Father     Heart Disease Father     Cancer Father     Diabetes Father     Cancer Maternal Grandmother     Cancer Maternal Grandfather     Migraines Brother     Cancer Paternal Grandmother        Allergies:  Soap and Silver    Home Medications:  Prior to Admission medications    Medication Sig Start Date End Date Taking?  Authorizing Provider   cephALEXin (KEFLEX) 500 MG capsule Take 1 capsule by mouth 3 times daily for 7 days 9/22/20 9/29/20 Yes Eduard Cuadra DO   sertraline (ZOLOFT) 25 MG tablet Take 1 tablet by mouth daily 8/26/20   AHSAN Agustin CNM   ferrous sulfate (IRON 325) 325 (65 Fe) MG tablet Take 1 tablet by mouth daily (with breakfast) 6/11/20   AHSAN Agustin CNM   Prenatal MV-Min-Fe Fum-FA-DHA (PRENATAL 1 PO) Take 1 tablet by mouth daily    Historical Provider, MD       REVIEW OF SYSTEMS    (2-9 systems for level 4, 10 or more for level 5) rash.   Neurological:      Mental Status: She is alert and oriented to person, place, and time. DIFFERENTIAL  DIAGNOSIS     Patient appears uncomfortable, she is moving around she has tenderness diffusely, in her flank as well as back with positive CVA tenderness she does have urinary symptoms but also possible symptoms with kidney stone she complains of bilateral upper back pain but worse on the right side increased frequency of urination. We will plan on labs urine analysis, pain control with Toradol IV fluids and reassessment.   He is 4 weeks postpartum but not actively breast-feeding    PLAN (Bard Saavedra / Afshin Aparicio / EKG):  Orders Placed This Encounter   Procedures    Culture, Urine    CT ABDOMEN PELVIS WO CONTRAST    Urinalysis Reflex to Culture    CBC    Basic Metabolic Panel    Lipase    Hepatic function panel    HCG Qualitative, Serum    Microscopic Urinalysis    Lactic Acid, Plasma    Insert peripheral IV       MEDICATIONS ORDERED:  Orders Placed This Encounter   Medications    ketorolac (TORADOL) injection 30 mg    0.9 % sodium chloride bolus    cephALEXin (KEFLEX) capsule 500 mg    cephALEXin (KEFLEX) 500 MG capsule     Sig: Take 1 capsule by mouth 3 times daily for 7 days     Dispense:  28 capsule     Refill:  0       DIAGNOSTIC RESULTS / EMERGENCY DEPARTMENT COURSE / MDM     LABS:  Results for orders placed or performed during the hospital encounter of 09/22/20   Urinalysis Reflex to Culture    Specimen: Urine, clean catch   Result Value Ref Range    Color, UA YELLOW YELLOW    Turbidity UA SLIGHTLY CLOUDY CLEAR    Glucose, Ur NEGATIVE NEGATIVE    Bilirubin Urine NEGATIVE NEGATIVE    Ketones, Urine NEGATIVE NEGATIVE    Specific Gravity, UA 1.020 1.010 - 1.020    Urine Hgb 3+ (A) NEGATIVE    pH, UA 6.0 5.0 - 9.0    Protein, UA 2+ (A) NEGATIVE    Urobilinogen, Urine Normal Normal    Nitrite, Urine NEGATIVE NEGATIVE    Leukocyte Esterase, Urine MODERATE (A) NEGATIVE    Urinalysis Comments NOT PELVIS WO CONTRAST   Preliminary Result   Limited noncontrast examination. No evidence of nephrolithiasis or significant hydronephrosis seen. The uterus appears enlarged and bulbous in appearance, which may reflect   postpartum status. Nonspecific 1 cm elongated hypodensity seen within the   lower endometrial cavity. If further imaging is clinically warranted, pelvic   ultrasound may be considered for further evaluation. EMERGENCY DEPARTMENT COURSE:  Patient with pain that is controlled after Toradol. Her urine analysis is consistent with a UTI and a urine culture was sent. Her CT scan was reviewed that does show an abnormal uterus which is likely related to her recent vaginal delivery. On repeat exam after pain is controlled she does not really have any tenderness in her abdomen but mostly in her back which is consistent more with pyelonephritis. We will plan to treat with Keflex. Patient does have an elevated leukocytosis and an remittent tachycardia. Discussed with patient option of admission versus home with antibiotics. And through shared decision making patient would like to go home on oral antibiotics. Her pain is controlled at this time. She knows to return for worsening fever worsening pain or vomiting if it develops. She is accompanied by her significant other who is also in agreement with plan. FINAL IMPRESSION      1.  Pyelonephritis          DISPOSITION / PLAN     DISPOSITION Decision To Discharge 09/22/2020 06:51:47 PM      PATIENT REFERRED TO:  [facility] ED  [address]  [phone]  Schedule an appointment as soon as possible for a visit in 2 days        DISCHARGE MEDICATIONS:  New Prescriptions    CEPHALEXIN (KEFLEX) 500 MG CAPSULE    Take 1 capsule by mouth 3 times daily for 7 days       Dianne Munguia  6:57 PM    Attending Emergency Physician  [facility]    (Please note that portions of this note were completed with a voice recognition program.  Effortswere made to edit the dictations but occasionally words are mis-transcribed.)              Mohammed Schlatter,   09/22/20 5417

## 2020-09-24 LAB
CULTURE: ABNORMAL
Lab: ABNORMAL
SPECIMEN DESCRIPTION: ABNORMAL

## 2020-10-05 ENCOUNTER — POSTPARTUM VISIT (OUTPATIENT)
Dept: OBGYN | Age: 20
End: 2020-10-05
Payer: COMMERCIAL

## 2020-10-05 VITALS
BODY MASS INDEX: 32.47 KG/M2 | WEIGHT: 172 LBS | HEIGHT: 61 IN | DIASTOLIC BLOOD PRESSURE: 68 MMHG | SYSTOLIC BLOOD PRESSURE: 122 MMHG

## 2020-10-05 LAB — HGB, POC: 11.9

## 2020-10-05 PROCEDURE — 99213 OFFICE O/P EST LOW 20 MIN: CPT | Performed by: ADVANCED PRACTICE MIDWIFE

## 2020-10-05 RX ORDER — NORGESTIMATE AND ETHINYL ESTRADIOL 0.25-0.035
1 KIT ORAL DAILY
Qty: 1 PACKET | Refills: 3 | Status: SHIPPED | OUTPATIENT
Start: 2020-10-05 | End: 2020-12-30 | Stop reason: ALTCHOICE

## 2020-10-05 NOTE — PROGRESS NOTES
(65 Fe) MG tablet, Take 1 tablet by mouth daily (with breakfast) (Patient not taking: Reported on 10/5/2020), Disp: 90 tablet, Rfl: 1    Prenatal MV-Min-Fe Fum-FA-DHA (PRENATAL 1 PO), Take 1 tablet by mouth daily, Disp: , Rfl:     Last Yearly:  03/2020    Last pap: 03/2020    Last HPV: na    Chief Complaint   Patient presents with    Postpartum Care     Post partum exam. Patient had vaginal delivery 08/25/2020. Discuss cycle control. Baby is formula fed. Patient is currently taking zoloft 25 mg. however does not feel it is helping. Last pap 03/20/2020 negative. How many Hours of sleep do you get a night: 3-4    Do you have a normal appetite: yes    Any problems or pain: depression    Do you feel like you coping well: fair    Is baby sleeping:fair    How is baby eating: good    How did first pediatrician visit go: good    EPPDS:15    Results for orders placed or performed in visit on 10/05/20   POCT hemoglobin   Result Value Ref Range    Hemoglobin 11.9          Nurse: Latrice PARKER    HPI:  PT presents for Post partum exam Follow up 6 weeks after delivery. C/o zoloft 25 \"not completely normal feeling yet\", had done poorly on well butrin as child and thinks she did well on zoloft 75 except for feeling tired      Objective   No acute distress  Excellent communications  Well-nourished  Assessment and Plan          Diagnosis Orders   1. Postpartum care and examination  POCT hemoglobin   2. Postpartum care following vaginal delivery     3. Adjustment disorder with mixed anxiety and depressed mood  sertraline (ZOLOFT) 50 MG tablet   4. Irregular menses  norgestimate-ethinyl estradiol (ORTHO-CYCLEN, 28,) 0.25-35 MG-MCG per tablet       desires another mirena for cycle control, last one \"moved\" and had to be taken out      I am having Shannon APrashant Veto start on sertraline and norgestimate-ethinyl estradiol.  I am also having her maintain her Prenatal MV-Min-Fe Fum-FA-DHA (PRENATAL 1 PO), ferrous sulfate, sertraline, and ibuprofen. Return in about 6 weeks (around 11/16/2020) for mirena insertion and med check. There are no Patient Instructions on file for this visit. Over 50% of time spent on counseling and care coordination on: see assessment and plan,  She was also counseled on her preventative health maintenance recommendations and follow-up.         FF time: 15 min      Laurie Keyes,10/5/2020 2:07 PM

## 2020-10-16 ENCOUNTER — TELEPHONE (OUTPATIENT)
Dept: OBGYN | Age: 20
End: 2020-10-16

## 2020-11-03 RX ORDER — FLUCONAZOLE 150 MG/1
150 TABLET ORAL DAILY
Qty: 3 TABLET | Refills: 0 | Status: SHIPPED | OUTPATIENT
Start: 2020-11-03 | End: 2020-11-06

## 2020-11-16 ENCOUNTER — HOSPITAL ENCOUNTER (OUTPATIENT)
Age: 20
Discharge: HOME | End: 2020-11-16
Payer: COMMERCIAL

## 2020-11-16 LAB — HCG QUANTITATIVE: <1 IU/L

## 2020-11-16 PROCEDURE — 36415 COLL VENOUS BLD VENIPUNCTURE: CPT

## 2020-11-16 PROCEDURE — 84702 CHORIONIC GONADOTROPIN TEST: CPT

## 2020-11-17 ENCOUNTER — PROCEDURE VISIT (OUTPATIENT)
Dept: OBGYN | Age: 20
End: 2020-11-17
Payer: COMMERCIAL

## 2020-11-17 VITALS
WEIGHT: 172.3 LBS | SYSTOLIC BLOOD PRESSURE: 122 MMHG | DIASTOLIC BLOOD PRESSURE: 80 MMHG | BODY MASS INDEX: 32.53 KG/M2 | HEIGHT: 61 IN

## 2020-11-17 PROCEDURE — 58300 INSERT INTRAUTERINE DEVICE: CPT | Performed by: ADVANCED PRACTICE MIDWIFE

## 2020-11-17 NOTE — PROGRESS NOTES
The patient is a 23 y.o. female that presents for IUD insertion for irregular menses   Jacque Anyaa  [id number]  [address]  [address] [facility] [address]  2000    Procedure: Mirena innsertion    Position: supine    Consent: Provider reviewed procedure with member. Consent form printed and signed? Yes    Pregnancy Test: Negative    Post Procedure ambulatory    OB History        2    Para   2    Term   2            AB        Living   2       SAB        TAB        Ectopic        Molar        Multiple   0    Live Births   2                Allergies: Soap and Silver    Vitals: Blood pressure 122/80, height 5' 1\" (1.549 m), weight 172 lb 4.8 oz (78.2 kg), last menstrual period 2020, not currently breastfeeding. Last coitus: 3  week(s)    Premedicated with Motrin 800 mg: No    Cytotec 200mcg:No    UCG: negative    Consent signed:  Yes    PROCEDURE:    Mirena      Bimanual exam: anteverted    Cervix cleansed with: Betadinex3    Tenaculum applied: No     Uterus sounded: 8cm    Mirena inserted without difficulty. IUD strings trimmed to 2 cm. Assessment:   Diagnosis Orders   1. Irregular menses  PA INSERT INTRAUTERINE DEVICE    levonorgestrel (MIRENA) IUD 52 mg 1 each         Plan:  Education on IUD  Abstain from intercourse for 72 hours  Motrin 800 mg Q 8 hours PRN  RTO one month for ultrasound for sting check. See [facility] for lot # and NDC #    Return in about 1 month (around 2020), or string check.     Ney Keyes,2020 4:26 PM

## 2020-12-30 ENCOUNTER — OFFICE VISIT (OUTPATIENT)
Dept: OBGYN | Age: 20
End: 2020-12-30
Payer: COMMERCIAL

## 2020-12-30 VITALS
DIASTOLIC BLOOD PRESSURE: 80 MMHG | WEIGHT: 178 LBS | SYSTOLIC BLOOD PRESSURE: 120 MMHG | HEIGHT: 61 IN | BODY MASS INDEX: 33.61 KG/M2

## 2020-12-30 PROCEDURE — 99213 OFFICE O/P EST LOW 20 MIN: CPT | Performed by: ADVANCED PRACTICE MIDWIFE

## 2020-12-30 PROCEDURE — G8427 DOCREV CUR MEDS BY ELIG CLIN: HCPCS | Performed by: ADVANCED PRACTICE MIDWIFE

## 2020-12-30 PROCEDURE — G8417 CALC BMI ABV UP PARAM F/U: HCPCS | Performed by: ADVANCED PRACTICE MIDWIFE

## 2020-12-30 PROCEDURE — 4004F PT TOBACCO SCREEN RCVD TLK: CPT | Performed by: ADVANCED PRACTICE MIDWIFE

## 2020-12-30 PROCEDURE — G8484 FLU IMMUNIZE NO ADMIN: HCPCS | Performed by: ADVANCED PRACTICE MIDWIFE

## 2020-12-30 RX ORDER — BUTALBITAL, ACETAMINOPHEN AND CAFFEINE 50; 325; 40 MG/1; MG/1; MG/1
TABLET ORAL
Qty: 30 TABLET | Refills: 1 | Status: SHIPPED | OUTPATIENT
Start: 2020-12-30 | End: 2021-05-04

## 2020-12-30 ASSESSMENT — PATIENT HEALTH QUESTIONNAIRE - PHQ9
SUM OF ALL RESPONSES TO PHQ QUESTIONS 1-9: 0
1. LITTLE INTEREST OR PLEASURE IN DOING THINGS: 0
SUM OF ALL RESPONSES TO PHQ9 QUESTIONS 1 & 2: 0
SUM OF ALL RESPONSES TO PHQ QUESTIONS 1-9: 0
2. FEELING DOWN, DEPRESSED OR HOPELESS: 0
SUM OF ALL RESPONSES TO PHQ QUESTIONS 1-9: 0

## 2020-12-30 NOTE — PROGRESS NOTES
PROBLEM VISIT     Date of service: 2020    Mell Patel  Is a 23 y.o. single female    PT's PCP is: No primary care provider on file. : 2000                                             Subjective:       No LMP recorded. Patient has had an implant.    OB History    Para Term  AB Living   2 2 2     2   SAB TAB Ectopic Molar Multiple Live Births           0 2      # Outcome Date GA Lbr Dionisio/2nd Weight Sex Delivery Anes PTL Lv   2 Term 20 39w0d 03:10  00:12 5 lb 6 oz (2.438 kg) F Vag-Spont EPI N GRACIELA   1 Term 19 39w2d 08:06  00:24 5 lb 5.3 oz (2.419 kg) F Vag-Spont EPI N GRACIELA        Social History     Tobacco Use   Smoking Status Current Every Day Smoker    Packs/day: 0.50    Years: 2.00    Pack years: 1.00    Types: Cigarettes   Smokeless Tobacco Never Used   Tobacco Comment    trying to quit on own        Social History     Substance and Sexual Activity   Alcohol Use No    Alcohol/week: 0.0 standard drinks    Comment: occasionally with family, hardly ever       Allergies: Soap and Silver      Current Outpatient Medications:     butalbital-acetaminophen-caffeine (FIORICET, ESGIC) -40 MG per tablet, 1-2 tablets every 6 hours prn headache, no more than 6 tablets in 24 hours, Disp: 30 tablet, Rfl: 1    sertraline (ZOLOFT) 50 MG tablet, Take 1 tablet by mouth daily (Patient not taking: Reported on 2020), Disp: 30 tablet, Rfl: 5    norgestimate-ethinyl estradiol (ORTHO-CYCLEN, 28,) 0.25-35 MG-MCG per tablet, Take 1 tablet by mouth daily (Patient not taking: Reported on 2020), Disp: 1 packet, Rfl: 3    ibuprofen (IBU) 800 MG tablet, Take 1 tablet by mouth every 8 hours as needed for Pain (Patient not taking: Reported on 10/5/2020), Disp: 21 tablet, Rfl: 0    sertraline (ZOLOFT) 25 MG tablet, Take 1 tablet by mouth daily, Disp: 30 tablet, Rfl: 0   ferrous sulfate (IRON 325) 325 (65 Fe) MG tablet, Take 1 tablet by mouth daily (with breakfast) (Patient not taking: Reported on 10/5/2020), Disp: 90 tablet, Rfl: 1    Prenatal MV-Min-Fe Fum-FA-DHA (PRENATAL 1 PO), Take 1 tablet by mouth daily, Disp: , Rfl:     Social History     Substance and Sexual Activity   Sexual Activity Yes    Partners: Male    Birth control/protection: I.U.D. Last Yearly:  2020    Last pap: 02/20/20    Last HPV: never    Chief Complaint   Patient presents with    Irregular Menses     IUD was inserted on 11/17/20. String check. pt states sinceplacement she noticed hair loss, migraines and she gets lightheaded easy she is unsure if its the iud causing these issues         PE:  Vital Signs  Blood pressure 120/80, height 5' 1\" (1.549 m), weight 178 lb (80.7 kg), not currently breastfeeding. Estimated body mass index is 33.63 kg/m² as calculated from the following:    Height as of this encounter: 5' 1\" (1.549 m). Weight as of this encounter: 178 lb (80.7 kg). NURSE: CAROLYNN    HPI: here for iUD string check mirena, c/o headaches persistent through pregnancy but have gotten more frequent since IUD, and hair loss     PT denies fever, chills, nausea and vomiting       Objective   No acute distress  Excellent communications  Well-nourished     Pelvic Exam: GENITAL/URINARY:  External Genitalia:  General appearance; normal, Hair distribution; normal, Lesions absent  Urethral Meatus:  Size normal, Location normal, Lesions absent, Prolapse absent  Urethra:   Fullness absent, Masses absent  Bladder:  Fullness absent, Masses absent, Tenderness absent, Cystocele absent  Vagina:  General appearance normal, Estrogen effect normal, Discharge absent, Lesions absent, Pelvic support normal  Cervix:  General appearance normal, Lesions absent, Discharge absent, Tenderness absent, Enlargement absent, Nodularity absent, strings present from os                         Results reviewed today: No results found for this visit on 12/30/20. Assessment and Plan          Diagnosis Orders   1. Presence of (intrauterine) contraceptive device     2. Other complicated headache syndrome  butalbital-acetaminophen-caffeine (FIORICET, ESGIC) -40 MG per tablet       explained headache not common with pg IUD, will call pcp for further evaluation in interval sporadic use of fioricet. Hair loss more likely to be due to postpartum year. Discussed Nioxin and take PNV      I am having Shannon Laws start on butalbital-acetaminophen-caffeine. I am also having her maintain her Prenatal MV-Min-Fe Fum-FA-DHA (PRENATAL 1 PO), ferrous sulfate, sertraline, ibuprofen, sertraline, and norgestimate-ethinyl estradiol. We will continue to administer levonorgestrel. Return in about 1 year (around 12/30/2021) for yearly. There are no Patient Instructions on file for this visit. Over 50% of time spent on counseling and care coordination on: see assessment and plan,  She was also counseled on her preventative health maintenance recommendations and follow-up.         FF time: 20 min      Norma Keyes,12/30/2020 12:06 PM

## 2021-02-09 ENCOUNTER — E-VISIT (OUTPATIENT)
Dept: OBGYN | Age: 21
End: 2021-02-09
Payer: COMMERCIAL

## 2021-02-09 ENCOUNTER — TELEPHONE (OUTPATIENT)
Dept: OBGYN | Age: 21
End: 2021-02-09

## 2021-02-09 DIAGNOSIS — R30.0 DYSURIA: Primary | ICD-10-CM

## 2021-02-09 PROCEDURE — 99421 OL DIG E/M SVC 5-10 MIN: CPT | Performed by: ADVANCED PRACTICE MIDWIFE

## 2021-02-09 NOTE — TELEPHONE ENCOUNTER
Patient called and left message with concerns of possible yeast infection. I  Called and advised patient to have evist with Lakshmi Campbell. Patient voiced understanding.

## 2021-03-17 ENCOUNTER — E-VISIT (OUTPATIENT)
Dept: OBGYN | Age: 21
End: 2021-03-17
Payer: COMMERCIAL

## 2021-03-17 ENCOUNTER — TELEPHONE (OUTPATIENT)
Dept: OBGYN | Age: 21
End: 2021-03-17

## 2021-03-17 DIAGNOSIS — N30.90 CYSTITIS: Primary | ICD-10-CM

## 2021-03-17 PROCEDURE — 99421 OL DIG E/M SVC 5-10 MIN: CPT | Performed by: ADVANCED PRACTICE MIDWIFE

## 2021-03-17 RX ORDER — SULFAMETHOXAZOLE AND TRIMETHOPRIM 800; 160 MG/1; MG/1
1 TABLET ORAL 2 TIMES DAILY
Qty: 14 TABLET | Refills: 0 | Status: SHIPPED | OUTPATIENT
Start: 2021-03-17 | End: 2021-03-24

## 2021-03-17 NOTE — TELEPHONE ENCOUNTER
Patient called with concerns of possible UTI, asking for treatment. I advised patient to schedule E visit with Mi Blanc. Patient voiced understanding.

## 2021-03-17 NOTE — PROGRESS NOTES
Symptoms consistent with cystitis, see joaquinit christianoe. 5-10 minutes were spent on the digital evaluation and management of this patient. Assessment and Plan          Diagnosis Orders   1. Cystitis  sulfamethoxazole-trimethoprim (BACTRIM DS;SEPTRA DS) 800-160 MG per tablet         if not improved instructed to call back and get ua C&S    I am having Shannon Laws start on sulfamethoxazole-trimethoprim. I am also having her maintain her butalbital-acetaminophen-caffeine. We will continue to administer levonorgestrel. No follow-ups on file. There are no Patient Instructions on file for this visit.         Tiffany Keyes,3/17/2021 6:01 PM

## 2021-05-04 ENCOUNTER — HOSPITAL ENCOUNTER (EMERGENCY)
Age: 21
Discharge: HOME | End: 2021-05-04
Payer: COMMERCIAL

## 2021-05-04 ENCOUNTER — APPOINTMENT (OUTPATIENT)
Dept: CT IMAGING | Age: 21
End: 2021-05-04
Payer: COMMERCIAL

## 2021-05-04 VITALS
HEART RATE: 99 BPM | OXYGEN SATURATION: 100 % | RESPIRATION RATE: 18 BRPM | SYSTOLIC BLOOD PRESSURE: 119 MMHG | WEIGHT: 180 LBS | TEMPERATURE: 98.4 F | DIASTOLIC BLOOD PRESSURE: 66 MMHG | BODY MASS INDEX: 34.01 KG/M2

## 2021-05-04 DIAGNOSIS — R51.9 NONINTRACTABLE HEADACHE, UNSPECIFIED CHRONICITY PATTERN, UNSPECIFIED HEADACHE TYPE: Primary | ICD-10-CM

## 2021-05-04 PROCEDURE — 6360000002 HC RX W HCPCS: Performed by: PHYSICIAN ASSISTANT

## 2021-05-04 PROCEDURE — 96374 THER/PROPH/DIAG INJ IV PUSH: CPT

## 2021-05-04 PROCEDURE — 99284 EMERGENCY DEPT VISIT MOD MDM: CPT

## 2021-05-04 PROCEDURE — 70450 CT HEAD/BRAIN W/O DYE: CPT

## 2021-05-04 PROCEDURE — 96375 TX/PRO/DX INJ NEW DRUG ADDON: CPT

## 2021-05-04 RX ORDER — DIPHENHYDRAMINE HYDROCHLORIDE 50 MG/ML
25 INJECTION INTRAMUSCULAR; INTRAVENOUS ONCE
Status: COMPLETED | OUTPATIENT
Start: 2021-05-04 | End: 2021-05-04

## 2021-05-04 RX ORDER — BUTALBITAL, ACETAMINOPHEN AND CAFFEINE 50; 325; 40 MG/1; MG/1; MG/1
1 TABLET ORAL EVERY 6 HOURS PRN
Qty: 30 TABLET | Refills: 0 | Status: SHIPPED | OUTPATIENT
Start: 2021-05-04 | End: 2021-08-02

## 2021-05-04 RX ORDER — PROCHLORPERAZINE EDISYLATE 5 MG/ML
10 INJECTION INTRAMUSCULAR; INTRAVENOUS ONCE
Status: COMPLETED | OUTPATIENT
Start: 2021-05-04 | End: 2021-05-04

## 2021-05-04 RX ADMIN — DIPHENHYDRAMINE HYDROCHLORIDE 25 MG: 50 INJECTION, SOLUTION INTRAMUSCULAR; INTRAVENOUS at 12:00

## 2021-05-04 RX ADMIN — PROCHLORPERAZINE EDISYLATE 10 MG: 5 INJECTION INTRAMUSCULAR; INTRAVENOUS at 12:00

## 2021-05-04 ASSESSMENT — ENCOUNTER SYMPTOMS
BACK PAIN: 0
SORE THROAT: 0
WHEEZING: 0
VOMITING: 0
CHEST TIGHTNESS: 0
CONSTIPATION: 0
EYE REDNESS: 0
ABDOMINAL PAIN: 0
NAUSEA: 0
RHINORRHEA: 0
DIARRHEA: 0
COUGH: 0
EYE DISCHARGE: 0
BLOOD IN STOOL: 0
SHORTNESS OF BREATH: 0

## 2021-05-04 ASSESSMENT — PAIN DESCRIPTION - LOCATION: LOCATION: HEAD

## 2021-05-04 ASSESSMENT — PAIN SCALES - GENERAL: PAINLEVEL_OUTOF10: 7

## 2021-05-04 ASSESSMENT — PAIN DESCRIPTION - ORIENTATION: ORIENTATION: RIGHT;POSTERIOR;ANTERIOR

## 2021-05-04 ASSESSMENT — PAIN DESCRIPTION - FREQUENCY: FREQUENCY: CONTINUOUS

## 2021-05-04 ASSESSMENT — PAIN DESCRIPTION - DESCRIPTORS: DESCRIPTORS: THROBBING;PRESSURE

## 2021-05-04 NOTE — ED PROVIDER NOTES
[facility] ED  EMERGENCY DEPARTMENT ENCOUNTER      Pt Name: Austin Vega  MRN: [id number]  [place] 2000  Date of evaluation: 5/4/2021  Provider: Wolfgang Blanchard, [facility]     Chief Complaint   Patient presents with    Headache     Headche ongoing for two months, complains of pressure and throbbing worse when bending over or coughing         HISTORY OF PRESENT ILLNESS   (Location/Symptom, Timing/Onset, Context/Setting,Quality, Duration, Modifying Factors, Severity)  Note limiting factors. Austin Vega is a18 y.o. female who presents to the emergency department with complaints of diarrhea and headache for the past she went to her doctor about months ago we gave her some pills but they did not fully alleviate her headache. She states sometimes they get worse if she down. She states today she does have a headache. She reports that her primary care told her she should come to the ER to get. And if we can find anything that she would do further at her office and then send her somewhere. She denies feelings of paresthesias. Denies any blackout episodes. Denies fever or chills. Denies neck or back pain. She has no other complaints at this time. HPI    Nursing Notes werereviewed. REVIEW OF SYSTEMS    (2-9 systems for level 4, 10 or more for level 5)     Review of Systems   Constitutional: Negative for chills, diaphoresis and fever. HENT: Negative for congestion, ear pain, rhinorrhea and sore throat. Eyes: Negative for discharge, redness and visual disturbance. Respiratory: Negative for cough, chest tightness, shortness of breath and wheezing. Cardiovascular: Negative for chest pain and palpitations. Gastrointestinal: Negative for abdominal pain, blood in stool, constipation, diarrhea, nausea and vomiting. Endocrine: Negative for polydipsia, polyphagia and polyuria.    Genitourinary: Negative for decreased urine volume, difficulty urinating, dysuria, frequency and hematuria. Musculoskeletal: Negative for arthralgias, back pain and myalgias. Skin: Negative for pallor and rash. Allergic/Immunologic: Negative for food allergies and immunocompromised state. Neurological: Positive for headaches. Negative for dizziness, syncope, weakness and light-headedness. Hematological: Negative for adenopathy. Does not bruise/bleed easily. Psychiatric/Behavioral: Negative for behavioral problems and suicidal ideas. The patient is not nervous/anxious. Except as noted above the remainder of the review of systems was reviewed and negative.        PAST MEDICAL HISTORY     Past Medical History:   Diagnosis Date    Asthma     pt has albuterol inhaler    Colitis     Colitis     Depression     2011, on meds    Dysmenorrhea in the adolescent     Facial abscess     was cyst left cheek    Rectal bleed     Vertigo          SURGICALHISTORY       Past Surgical History:   Procedure Laterality Date    ABSCESS DRAINAGE Left 2011    once left cheek    COLONOSCOPY      COLONOSCOPY  12/05/2013        UPPER GASTROINTESTINAL ENDOSCOPY  12/05/2013             CURRENT MEDICATIONS       Discharge Medication List as of 5/4/2021  1:49 PM            ALLERGIES   Soap and Silver    FAMILY HISTORY       Family History   Problem Relation Age of Onset    Allergies Mother     Crohn's Disease Mother     High Cholesterol Father     Migraines Father     Stroke Father     Heart Disease Father     Cancer Father     Diabetes Father     Cancer Maternal Grandmother     Cancer Maternal Grandfather     Migraines Brother     Cancer Paternal Grandmother           SOCIAL HISTORY       Social History     Socioeconomic History    Marital status: Single     Spouse name: None    Number of children: None    Years of education: None    Highest education level: None   Occupational History    None   Social Needs    Financial resource strain: None    Food insecurity Worry: None     Inability: None    Transportation needs     Medical: None     Non-medical: None   Tobacco Use    Smoking status: Current Every Day Smoker     Packs/day: 1.00     Years: 2.00     Pack years: 2.00     Types: Cigarettes    Smokeless tobacco: Never Used   Substance and Sexual Activity    Alcohol use: No     Alcohol/week: 0.0 standard drinks     Comment: occasionally with family, hardly ever    Drug use: No    Sexual activity: Yes     Partners: Male     Birth control/protection: I.U.D. Lifestyle    Physical activity     Days per week: None     Minutes per session: None    Stress: None   Relationships    Social connections     Talks on phone: None     Gets together: None     Attends Church service: None     Active member of club or organization: None     Attends meetings of clubs or organizations: None     Relationship status: None    Intimate partner violence     Fear of current or ex partner: None     Emotionally abused: None     Physically abused: None     Forced sexual activity: None   Other Topics Concern    None   Social History Narrative    None       SCREENINGS    [place] Coma Scale  Eye Opening: Spontaneous  Best Verbal Response: Oriented  Best Motor Response: Obeys commands  Arsalan Coma Scale Score: 15        PHYSICAL EXAM    (up to 7 for level 4, 8 or more for level 5)     ED Triage Vitals [05/04/21 1126]   BP Temp Temp Source Pulse Resp SpO2 Height Weight   122/69 98.4 °F (36.9 °C) Oral 100 18 100 % -- 180 lb (81.6 kg)       Physical Exam  Vitals signs and nursing note reviewed. Constitutional:       General: She is not in acute distress. Appearance: Normal appearance. She is well-developed. She is not ill-appearing, toxic-appearing or diaphoretic. HENT:      Head: Normocephalic and atraumatic. Right Ear: External ear normal.      Left Ear: External ear normal.      Mouth/Throat:      Mouth: Mucous membranes are moist.      Pharynx: No oropharyngeal exudate. Physician who either signs orCo-signs this chart in the absence of a cardiologist.      RADIOLOGY:   Non-plainfilm images such as CT, Ultrasound and MRI are read by the radiologist. Plain radiographic images are visualized and preliminarily interpreted by the emergency physician with the below findings:      Interpretationper the Radiologist below, if available at the time of this note:    CT Head WO Contrast   Final Result   No acute intracranial abnormality. ED BEDSIDE ULTRASOUND:   Performed by ED Physician - none    LABS:  Labs Reviewed - No data to display    All other labs were within normal range or not returned as of this dictation. EMERGENCY DEPARTMENT COURSE and DIFFERENTIAL DIAGNOSIS/MDM:   Vitals:    Vitals:    05/04/21 1126 05/04/21 1419   BP: 122/69 119/66   Pulse: 100 99   Resp: 18 18   Temp: 98.4 °F (36.9 °C) 98.4 °F (36.9 °C)   TempSrc: Oral    SpO2: 100%    Weight: 180 lb (81.6 kg)          MDM  8 months of ongoing headache. She is neurovascularly intact she is nonencephalopathic is not meningismus. Headaches are intermittent. She was told by her primary care provider to come to the ER for scan. At this point she complains of severe headache given length of symptoms. Vision is intact she has no deficits. Medically and reevaluate    Repeat evaluation feels 100% better and she would like to go home    Patient is resting comfortably at this time and in no distress. I have updated patient on current results. We discussed at length the patient's diagnosis. Patient understands to follow up with primary care provider in the next 2 days. Patient verbalized understanding of this. We went over medications. Strict return warnings were given. Patient will return to the emergency room as needed with new or worsening complaints. Been given instructions for follow-up with neurology. She will call today. Procedures    FINAL IMPRESSION      1.  Nonintractable headache, unspecified chronicity pattern, unspecified headache type        DISPOSITION/PLAN   DISPOSITION Decision To Discharge 05/04/2021 01:46:58 PM      PATIENT REFERRED TO:  [facility] ED  [address]  [address] [facility]  [phone]    If symptoms worsen, As needed    Critical access hospital[facility]  [address] [facility] [phone]    Schedule an appointment as soon as possible for a visit in 1 day      Lola Fields MD  [facility] [address] [facility]  [phone]    Schedule an appointment as soon as possible for a visit         DISCHARGE MEDICATIONS:  Discharge Medication List as of 5/4/2021  1:49 PM                 Summation      Patient Course:      ED Medications administered this visit:    Medications   diphenhydrAMINE (BENADRYL) injection 25 mg (25 mg Intravenous Given 5/4/21 1200)   prochlorperazine (COMPAZINE) injection 10 mg (10 mg Intravenous Given 5/4/21 1200)       New Prescriptions from this visit:    Discharge Medication List as of 5/4/2021  1:49 PM          Follow-up:  [facility] ED  [address] [facility]  [address] [facility]  [phone]    If symptoms worsen, As needed    [address] [facility]  [address] [facility]  [phone]    Schedule an appointment as soon as possible for a visit in 1 day      Lola Fields MD  [facility] [address] [place] [address]  [phone]    Schedule an appointment as soon as possible for a visit           Final Impression:   1.  Nonintractable headache, unspecified chronicity pattern, unspecified headache type               (Please note that portions of this note were completed with a voice recognition program.  Efforts were made to edit the dictations but occasionally words are mis-transcribed.)           Ortega Bryan PA-C  05/04/21 [address] [place], [place]  05/04/21 0883

## 2021-07-01 ENCOUNTER — OFFICE VISIT (OUTPATIENT)
Dept: NEUROLOGY | Age: 21
End: 2021-07-01
Payer: COMMERCIAL

## 2021-07-01 VITALS
SYSTOLIC BLOOD PRESSURE: 112 MMHG | TEMPERATURE: 97.7 F | DIASTOLIC BLOOD PRESSURE: 67 MMHG | HEART RATE: 104 BPM | RESPIRATION RATE: 18 BRPM | HEIGHT: 61 IN | WEIGHT: 188.3 LBS | BODY MASS INDEX: 35.55 KG/M2

## 2021-07-01 DIAGNOSIS — G43.019 INTRACTABLE MIGRAINE WITHOUT AURA AND WITHOUT STATUS MIGRAINOSUS: Primary | ICD-10-CM

## 2021-07-01 DIAGNOSIS — R20.2 LEFT HAND PARESTHESIA: ICD-10-CM

## 2021-07-01 PROCEDURE — 99204 OFFICE O/P NEW MOD 45 MIN: CPT | Performed by: NEUROMUSCULOSKELETAL MEDICINE, SPORTS MEDICINE

## 2021-07-01 PROCEDURE — G8427 DOCREV CUR MEDS BY ELIG CLIN: HCPCS | Performed by: NEUROMUSCULOSKELETAL MEDICINE, SPORTS MEDICINE

## 2021-07-01 PROCEDURE — 4004F PT TOBACCO SCREEN RCVD TLK: CPT | Performed by: NEUROMUSCULOSKELETAL MEDICINE, SPORTS MEDICINE

## 2021-07-01 PROCEDURE — G8417 CALC BMI ABV UP PARAM F/U: HCPCS | Performed by: NEUROMUSCULOSKELETAL MEDICINE, SPORTS MEDICINE

## 2021-07-01 RX ORDER — TOPIRAMATE 25 MG/1
25 TABLET ORAL NIGHTLY
Qty: 30 TABLET | Refills: 0 | Status: SHIPPED | OUTPATIENT
Start: 2021-07-01 | End: 2021-08-02 | Stop reason: SDUPTHER

## 2021-07-01 NOTE — PROGRESS NOTES
NEUROLOGY CONSULT    Patient Name:  Nanda Noe  :   2000  Clinic Visit Date: 2021    I saw Ms. Nanda Noe  in the neurology clinic today for headaches. Patient is a 28-year-old left-handed lady with no known medical illnesses in the past, presents office today with complaints of persistent recurrent headaches since about a year. A typical headache starts off as a dull aching pain over the temporal region on either the left or right side of the head, followed quickly by a generalized throbbing headache which can last up to several hours. Headache severity is usually approximately 5-9/10. Frequency is approximately 15 severe headaches a month but she has a headache \"almost every day \". No history of aura prior to the headaches. No history of double vision blurred vision, tinnitus, vertigo, neck pain or weakness in extremities with the headaches. She usually has nausea when the headache is severe. Coughing sneezing or any other exertional activity makes the headaches worse. She has tried simple over-the-counter medications such as Tylenol without any improvement. On May 4, 2021 she was in the [facility] emergency room with a severe headache. CT scan of the brain was normal she was recommended to take Fioricet has been helping a little. In  she had symptoms of early morning headaches. MRI of the brain done at that time was unremarkable. She also complains of intermittent left hand numbness and paresthesia especially early in the morning upon waking up. Her father and a younger brother have a history headaches. REVIEW OF SYSTEMS    Constitutional Weight changes: present, change in appetite: present Fatigue: absent; Fevers : absent, Any recent hospitalizations:  present   HEENT Ears: normal,  Visual disturbance: present   Respiratory Shortness of breath: present, choking:  absent, Cough: present, Snoring : absent   Cardiovascular Chest pain: absent, Leg swelling :absent, palpitations : absent, fainting : absent   GI Constipation: absent, Diarrhea: absent, Swallowing change: absent    Urinary frequency: absent, Urinary urgency: absent, Urinary incontinence: absent   Musculoskeletal Neck pain: absent, Back pain: absent, Stiffness: absent, Muscle pain: absent, Joint pain: absent, restless leg : absent   Dermatological Hair loss: present, Skin changes: present   Neurological Confusion: present, Trouble concentrating: present, Seizures: absent;  Memory loss: absent, balance problem: present, Dizziness: present, vertigo: present, Weakness: present, Numbness present, Tremor: present, Spasm: absent, involuntary movement: absent, Speech difficulty: absent, Headache: present, Light sensitivity: present   Psychiatric Anxiety: absent, Depression  absent, drug abuse: absent, Hallucination: absent, mood disorder: absent, Suicidal ideations absent   Hematologic Abnormal bleeding: absent, Anemia: absent, Lymph gland changes: absent Clotting disorder: absent     Past Medical History:   Diagnosis Date    Asthma     pt has albuterol inhaler    Colitis     Colitis     Depression     2011, on meds    Dysmenorrhea in the adolescent     Facial abscess     was cyst left cheek    Rectal bleed     Vertigo        Past Surgical History:   Procedure Laterality Date    ABSCESS DRAINAGE Left 2011    once left cheek    COLONOSCOPY      COLONOSCOPY  12/05/2013        UPPER GASTROINTESTINAL ENDOSCOPY  12/05/2013           Social History     Socioeconomic History    Marital status: Single     Spouse name: Not on file    Number of children: Not on file    Years of education: Not on file    Highest education level: Not on file   Occupational History    Not on file   Tobacco Use    Smoking status: Current Every Day Smoker     Packs/day: 1.00     Years: 2.00     Pack years: 2.00     Types: Cigarettes    Smokeless tobacco: Never Used   Vaping Use    Vaping Use: Never used Substance and Sexual Activity    Alcohol use: No     Alcohol/week: 0.0 standard drinks     Comment: occasionally with family, hardly ever    Drug use: No    Sexual activity: Yes     Partners: Male     Birth control/protection: I.U.D. Other Topics Concern    Not on file   Social History Narrative    Not on file     Social Determinants of Health     Financial Resource Strain:     Difficulty of Paying Living Expenses:    Food Insecurity:     Worried About Running Out of Food in the Last Year:     [address] in the Last Year:    Transportation Needs:     Lack of Transportation (Medical):      Lack of Transportation (Non-Medical):    Physical Activity:     Days of Exercise per Week:     Minutes of Exercise per Session:    Stress:     Feeling of Stress :    Social Connections:     Frequency of Communication with Friends and Family:     Frequency of Social Gatherings with Friends and Family:     Attends Gnosticist Services:     Active Member of Clubs or Organizations:     Attends Club or Organization Meetings:     Marital Status:    Intimate Partner Violence:     Fear of Current or Ex-Partner:     Emotionally Abused:     Physically Abused:     Sexually Abused:        Family History   Problem Relation Age of Onset    Allergies Mother     Crohn's Disease Mother     High Cholesterol Father     Migraines Father     Stroke Father     Heart Disease Father     Cancer Father     Diabetes Father     Cancer Maternal Grandmother     Cancer Maternal Grandfather     Migraines Brother     Cancer Paternal Grandmother        Current Outpatient Medications   Medication Sig Dispense Refill    topiramate (TOPAMAX) 25 MG tablet Take 1 tablet by mouth nightly 30 tablet 0    butalbital-acetaminophen-caffeine (ESGIC) -40 MG per tablet Take 1 tablet by mouth every 6 hours as needed for Headaches (Patient not taking: Reported on 7/1/2021) 30 tablet 0     Current Facility-Administered Medications Medication Dose Route Frequency Provider Last Rate Last Admin    levonorgestrel (MIRENA) IUD 52 mg 1 each  1 each Intrauterine Once Heidy John, APRN - CNM   1 each at 11/17/20 1702        DATA:  Lab Results   Component Value Date    WBC 14.7 (H) 09/22/2020    HGB 11.9 10/05/2020     09/22/2020    ALT <5 (L) 09/22/2020    AST 10 09/22/2020     09/22/2020    K 3.7 09/22/2020     09/22/2020    CREATININE 0.67 09/22/2020    BUN 6 09/22/2020    CO2 24 09/22/2020    TSH 0.81 03/16/2015    INR 0.9 10/10/2018    GLUF 96 06/15/2020       /67 (Site: Left Upper Arm, Position: Sitting, Cuff Size: Medium Adult)   Pulse 104   Temp 97.7 °F (36.5 °C) (Temporal)   Resp 18   Ht 5' 1\" (1.549 m)   Wt 188 lb 4.8 oz (85.4 kg)   BMI 35.58 kg/m²     NEUROLOGICAL EXAMINATION:     MENTAL STATUS: Patient is alert and oriented x3. There is no confusion or aphasia. Memory is normal.     CRANIAL NERVES: Pupils are equal and reactive. EOMS are equal in all directions. There is no nystagmus or any other abnormal eye movements. Facial sensation is normal.  There is no facial weakness. There is no loss of hearing. Palate and tongue movements are normal.     MOTOR EXAMINATION: Muscle tone is normal in all the limbs. There is no focal weakness. Muscle strength is 5/5 in both upper and lower limbs. There are no abnormal limb movements. SENSORY EXAMINATION: Normal.     STRETCH REFLEXES: 2+ and symmetrical in both the upper and lower limbs. GAIT: There is no ataxia. IMPRESSION:    1. Probable migraine headaches. Normal neurological examination. 2.  Tobacco abuse. 3.   Intermittent left hand paresthesia. Symptoms are suggestive of carpal tunnel syndrome. If these symptoms increase in severity we will consider further work-up    PLAN:    1.  I have started her on Topamax 25 mg once a day at night. Potential side effects discussed with the patient.   She was advised to maintain a headache diary. 2.  Smoking cessation advised. Also recommended to lose weight, eat healthy with a reduced carbohydrate diet, and exercise. 3.  Follow-up in 1 month    NOTE: This neurology evaluation is part of outpatient coverage at [facility]  1-2 days per week. Patients requiring frequent evaluations or uncomfortable with potential 3-4 day turnaround on questions or calls  may be better served by a neurologist in the area full time. [facility] at [facility]. Kelsi is available for outpatient visits and procedures including EMG/NCS. Non-[facility] neurologists also practice in [facility] (Dr. Dmitriy Ignacio) and [facility] (Susan Sims).        Ace Kahn MD   7/1/2021  9:38 AM

## 2021-07-01 NOTE — PATIENT INSTRUCTIONS
SURVEY:    You may be receiving a survey from [employer] regarding your visit today. Please complete the survey to enable us to provide the highest quality of care to you and your family. If you cannot score us a very good on any question, please call the office to discuss how we could have made your experience a very good one. Thank you.

## 2021-08-02 ENCOUNTER — OFFICE VISIT (OUTPATIENT)
Dept: NEUROLOGY | Age: 21
End: 2021-08-02
Payer: COMMERCIAL

## 2021-08-02 VITALS
RESPIRATION RATE: 18 BRPM | SYSTOLIC BLOOD PRESSURE: 110 MMHG | HEART RATE: 86 BPM | WEIGHT: 188.7 LBS | TEMPERATURE: 97 F | HEIGHT: 61 IN | BODY MASS INDEX: 35.63 KG/M2 | DIASTOLIC BLOOD PRESSURE: 64 MMHG

## 2021-08-02 DIAGNOSIS — G43.019 INTRACTABLE MIGRAINE WITHOUT AURA AND WITHOUT STATUS MIGRAINOSUS: Primary | ICD-10-CM

## 2021-08-02 PROCEDURE — G8417 CALC BMI ABV UP PARAM F/U: HCPCS | Performed by: NEUROMUSCULOSKELETAL MEDICINE, SPORTS MEDICINE

## 2021-08-02 PROCEDURE — G8427 DOCREV CUR MEDS BY ELIG CLIN: HCPCS | Performed by: NEUROMUSCULOSKELETAL MEDICINE, SPORTS MEDICINE

## 2021-08-02 PROCEDURE — 4004F PT TOBACCO SCREEN RCVD TLK: CPT | Performed by: NEUROMUSCULOSKELETAL MEDICINE, SPORTS MEDICINE

## 2021-08-02 PROCEDURE — 99212 OFFICE O/P EST SF 10 MIN: CPT | Performed by: NEUROMUSCULOSKELETAL MEDICINE, SPORTS MEDICINE

## 2021-08-02 RX ORDER — TOPIRAMATE 25 MG/1
25 TABLET ORAL NIGHTLY
Qty: 90 TABLET | Refills: 1 | Status: SHIPPED | OUTPATIENT
Start: 2021-08-02 | End: 2021-10-31

## 2021-08-02 NOTE — PROGRESS NOTES
NEUROLOGY Follow up    Patient Name:  Cecily Gordillo  :   2000  Clinic Visit Date: 2021    I saw Ms. Cecily Gordillo  in the neurology clinic today for follow-up of migraine headaches. Doing very well on Topamax 25 mg once a day. \"I have had no headaches since my last visit a month ago \". No side effects with Topamax. .  No new neurological symptoms    REVIEW OF SYSTEMS    Constitutional Weight changes: absent, change in appetite: absent Fatigue: absent; Fevers : absent, Any recent hospitalizations:  absent   HEENT Ears: normal,  Visual disturbance: absent   Respiratory Shortness of breath: present, choking:  absent, Cough: absent, Snoring : absent   Cardiovascular Chest pain: absent, Leg swelling :absent, palpitations : absent, fainting : absent   GI Constipation: absent, Diarrhea: absent, Swallowing change: absent    Urinary frequency: absent, Urinary urgency: absent, Urinary incontinence: absent   Musculoskeletal Neck pain: absent, Back pain: absent, Stiffness: absent, Muscle pain: absent, Joint pain: absent, restless leg : absent   Dermatological Hair loss: absent, Skin changes: absent   Neurological Confusion: absent, Trouble concentrating: absent, Seizures: absent;  Memory loss: absent, balance problem: absent, Dizziness: absent, vertigo: absent, Weakness: absent, Numbness absent, Tremor: absent, Spasm: absent, involuntary movement: absent, Speech difficulty: absent, Headache: present, Light sensitivity: absent   Psychiatric Anxiety: absent, Depression  absent, drug abuse: absent, Hallucination: absent, mood disorder: absent, Suicidal ideations absent   Hematologic Abnormal bleeding: absent, Anemia: absent, Lymph gland changes: absent Clotting disorder: absent     Past Medical History:   Diagnosis Date    Asthma     pt has albuterol inhaler    Colitis     Colitis     Depression     , on meds    Dysmenorrhea in the adolescent     Facial abscess     was cyst left cheek    Rectal bleed     Vertigo        Past Surgical History:   Procedure Laterality Date    ABSCESS DRAINAGE Left 2011    once left cheek    COLONOSCOPY      COLONOSCOPY  12/05/2013        UPPER GASTROINTESTINAL ENDOSCOPY  12/05/2013           Social History     Socioeconomic History    Marital status: Single     Spouse name: Not on file    Number of children: Not on file    Years of education: Not on file    Highest education level: Not on file   Occupational History    Not on file   Tobacco Use    Smoking status: Current Every Day Smoker     Packs/day: 1.00     Years: 2.00     Pack years: 2.00     Types: Cigarettes    Smokeless tobacco: Never Used   Vaping Use    Vaping Use: Never used   Substance and Sexual Activity    Alcohol use: No     Alcohol/week: 0.0 standard drinks     Comment: occasionally with family, hardly ever    Drug use: No    Sexual activity: Yes     Partners: Male     Birth control/protection: I.U.D. Other Topics Concern    Not on file   Social History Narrative    Not on file     Social Determinants of Health     Financial Resource Strain:     Difficulty of Paying Living Expenses:    Food Insecurity:     Worried About Running Out of Food in the Last Year:     [address] in the Last Year:    Transportation Needs:     Lack of Transportation (Medical):      Lack of Transportation (Non-Medical):    Physical Activity:     Days of Exercise per Week:     Minutes of Exercise per Session:    Stress:     Feeling of Stress :    Social Connections:     Frequency of Communication with Friends and Family:     Frequency of Social Gatherings with Friends and Family:     Attends Hoahaoism Services:     Active Member of Clubs or Organizations:     Attends Club or Organization Meetings:     Marital Status:    Intimate Partner Violence:     Fear of Current or Ex-Partner:     Emotionally Abused:     Physically Abused:     Sexually Abused:        Family History   Problem

## 2021-08-02 NOTE — PATIENT INSTRUCTIONS
SURVEY:    You may be receiving a survey from [employer] regarding your visit today. Please complete the survey to enable us to provide the highest quality of care to you and your family. If you cannot score us a very good on any question, please call the office to discuss how we could have made your experience a very good one. Thank you.

## 2021-08-06 ENCOUNTER — E-VISIT (OUTPATIENT)
Dept: OTHER | Facility: CLINIC | Age: 21
End: 2021-08-06
Payer: COMMERCIAL

## 2021-08-06 DIAGNOSIS — N76.0 ACUTE VAGINITIS: Primary | ICD-10-CM

## 2021-08-06 PROCEDURE — 99421 OL DIG E/M SVC 5-10 MIN: CPT | Performed by: ADVANCED PRACTICE MIDWIFE

## 2021-10-19 ENCOUNTER — HOSPITAL ENCOUNTER (OUTPATIENT)
Age: 21
Setting detail: SPECIMEN
Discharge: HOME | End: 2021-10-19
Payer: COMMERCIAL

## 2021-10-19 ENCOUNTER — OFFICE VISIT (OUTPATIENT)
Dept: OBGYN | Age: 21
End: 2021-10-19
Payer: COMMERCIAL

## 2021-10-19 VITALS
DIASTOLIC BLOOD PRESSURE: 66 MMHG | BODY MASS INDEX: 36.25 KG/M2 | HEIGHT: 61 IN | SYSTOLIC BLOOD PRESSURE: 114 MMHG | WEIGHT: 192 LBS

## 2021-10-19 DIAGNOSIS — N76.0 VULVOVAGINITIS: ICD-10-CM

## 2021-10-19 DIAGNOSIS — R68.82 DECREASED LIBIDO: ICD-10-CM

## 2021-10-19 DIAGNOSIS — N89.8 VAGINAL DISCHARGE: ICD-10-CM

## 2021-10-19 DIAGNOSIS — N76.0 VULVOVAGINITIS: Primary | ICD-10-CM

## 2021-10-19 LAB
DIRECT EXAM: ABNORMAL
Lab: ABNORMAL
SPECIMEN DESCRIPTION: ABNORMAL

## 2021-10-19 PROCEDURE — 87510 GARDNER VAG DNA DIR PROBE: CPT

## 2021-10-19 PROCEDURE — 87070 CULTURE OTHR SPECIMN AEROBIC: CPT

## 2021-10-19 PROCEDURE — 87660 TRICHOMONAS VAGIN DIR PROBE: CPT

## 2021-10-19 PROCEDURE — G8417 CALC BMI ABV UP PARAM F/U: HCPCS | Performed by: ADVANCED PRACTICE MIDWIFE

## 2021-10-19 PROCEDURE — 87491 CHLMYD TRACH DNA AMP PROBE: CPT

## 2021-10-19 PROCEDURE — G8427 DOCREV CUR MEDS BY ELIG CLIN: HCPCS | Performed by: ADVANCED PRACTICE MIDWIFE

## 2021-10-19 PROCEDURE — 87591 N.GONORRHOEAE DNA AMP PROB: CPT

## 2021-10-19 PROCEDURE — 99213 OFFICE O/P EST LOW 20 MIN: CPT | Performed by: ADVANCED PRACTICE MIDWIFE

## 2021-10-19 PROCEDURE — 87480 CANDIDA DNA DIR PROBE: CPT

## 2021-10-19 PROCEDURE — 4004F PT TOBACCO SCREEN RCVD TLK: CPT | Performed by: ADVANCED PRACTICE MIDWIFE

## 2021-10-19 PROCEDURE — G8484 FLU IMMUNIZE NO ADMIN: HCPCS | Performed by: ADVANCED PRACTICE MIDWIFE

## 2021-10-19 RX ORDER — FLUTICASONE PROPIONATE 44 MCG
2 AEROSOL WITH ADAPTER (GRAM) INHALATION 2 TIMES DAILY
COMMUNITY
Start: 2021-09-10

## 2021-10-19 RX ORDER — FLUCONAZOLE 150 MG/1
150 TABLET ORAL
Qty: 3 TABLET | Refills: 0 | Status: SHIPPED | OUTPATIENT
Start: 2021-10-19 | End: 2022-02-17 | Stop reason: ALTCHOICE

## 2021-10-19 RX ORDER — METRONIDAZOLE 500 MG/1
500 TABLET ORAL 2 TIMES DAILY
Qty: 14 TABLET | Refills: 0 | Status: SHIPPED | OUTPATIENT
Start: 2021-10-19 | End: 2021-10-26

## 2021-10-19 RX ORDER — ALBUTEROL SULFATE 90 UG/1
1 AEROSOL, METERED RESPIRATORY (INHALATION) EVERY 4 HOURS PRN
COMMUNITY
Start: 2021-09-10 | End: 2022-07-24

## 2021-10-19 ASSESSMENT — PATIENT HEALTH QUESTIONNAIRE - PHQ9
SUM OF ALL RESPONSES TO PHQ9 QUESTIONS 1 & 2: 0
2. FEELING DOWN, DEPRESSED OR HOPELESS: 0
SUM OF ALL RESPONSES TO PHQ QUESTIONS 1-9: 0
1. LITTLE INTEREST OR PLEASURE IN DOING THINGS: 0

## 2021-10-19 NOTE — PROGRESS NOTES
PROBLEM VISIT     Date of service: 10/19/2021    Francine Suresh  Is a 19 y.o.  female    PT's PCP is: No primary care provider on file. : 2000                                             Subjective:       No LMP recorded (lmp unknown). (Menstrual status: Other - See Notes). OB History    Para Term  AB Living   2 2 2     2   SAB TAB Ectopic Molar Multiple Live Births           0 2      # Outcome Date GA Lbr Dionisio/2nd Weight Sex Delivery Anes PTL Lv   2 Term 20 39w0d 03:10  00:12 5 lb 6 oz (2.438 kg) F Vag-Spont EPI N GRACIELA   1 Term 19 39w2d 08:06 / 00:24 5 lb 5.3 oz (2.419 kg) F Vag-Spont EPI N GRACIELA        Social History     Tobacco Use   Smoking Status Current Every Day Smoker    Packs/day: 1.00    Years: 2.00    Pack years: 2.00    Types: Cigarettes   Smokeless Tobacco Never Used        Social History     Substance and Sexual Activity   Alcohol Use No    Alcohol/week: 0.0 standard drinks    Comment: occasionally with family, hardly ever       Allergies: Patient has no known allergies. Current Outpatient Medications:     albuterol sulfate  (90 Base) MCG/ACT inhaler, , Disp: , Rfl:     FLOVENT HFA 44 MCG/ACT inhaler, , Disp: , Rfl:     metroNIDAZOLE (FLAGYL) 500 MG tablet, Take 1 tablet by mouth 2 times daily for 7 days, Disp: 14 tablet, Rfl: 0    fluconazole (DIFLUCAN) 150 MG tablet, Take 1 tablet by mouth every 3 days, Disp: 3 tablet, Rfl: 0    topiramate (TOPAMAX) 25 MG tablet, Take 1 tablet by mouth nightly, Disp: 90 tablet, Rfl: 1    Social History     Substance and Sexual Activity   Sexual Activity Yes    Partners: Male    Birth control/protection: I.U.D. Last Yearly:  2020    Last pap:     Last HPV: never    Have you had a positive covid test: No    Have you had the covid immunization: No    Chief Complaint   Patient presents with    Vaginal Discharge     C/O vaginal discharge, white, yellow, odor and itch. symptoms off and on for several weeks. Has treated with OTC twice symptoms would get better and then come back. PE:  Vital Signs  Blood pressure 114/66, height 5' 1\" (1.549 m), weight 192 lb (87.1 kg), not currently breastfeeding. Estimated body mass index is 36.28 kg/m² as calculated from the following:    Height as of this encounter: 5' 1\" (1.549 m). Weight as of this encounter: 192 lb (87.1 kg). PHQ-9 Total Score: 0 (10/19/2021  9:16 AM)      NURSE: Latrice LPKAYLYN    HPI: here for c/o vaginal discharge denies irritation     PT denies fever, chills, nausea and vomiting       Objective   No acute distress  Excellent communications  Well-nourished     GI: Abdomen soft, non-tender. BS normal. No masses,  No organomegaly           Pelvic Exam: GENITAL/URINARY:  External Genitalia:  General appearance; normal, Hair distribution; normal, Lesions absent  Urethral Meatus:  Size normal, Location normal, Lesions absent, Prolapse absent  Urethra: Fullness absent, Masses absent  Bladder:  Fullness absent, Masses absent, Tenderness absent, Cystocele absent  Vagina:  General appearance normal, Estrogen effect normal, Discharge thin white, no odor, Lesions absent, Pelvic support normal  Cervix:  General appearance normal, Lesions absent, Discharge absent, Tenderness absent, Enlargement absent, Nodularity absent  Uterus:  Size normal, Tenderness absent  Adenexa: Masses absent, Tenderness absent  Anus/Perineum:  Lesions absent and Masses absent                                              Results reviewed today:    No results found for this visit on 10/19/21. Assessment and Plan          Diagnosis Orders   1. Vulvovaginitis  C.trachomatis N.gonorrhoeae DNA    VAGINITIS DNA PROBE    Culture, Genital    metroNIDAZOLE (FLAGYL) 500 MG tablet    fluconazole (DIFLUCAN) 150 MG tablet   2. Vaginal discharge     3. Decreased libido               I am having Shannonyahaira Laws start on metroNIDAZOLE and fluconazole.  I am also having her maintain her topiramate, albuterol sulfate HFA, and Flovent HFA. We will continue to administer levonorgestrel. Return for will call with results. There are no Patient Instructions on file for this visit. Over 50% of time spent on counseling and care coordination on: see assessment and plan,  She was also counseled on her preventative health maintenance recommendations and follow-up.         FF time: 20 min      AHSAN Matthews CNM,10/19/2021 10:00 AM

## 2021-10-20 LAB
C TRACH DNA GENITAL QL NAA+PROBE: NEGATIVE
N. GONORRHOEAE DNA: NEGATIVE
SPECIMEN DESCRIPTION: NORMAL

## 2021-10-22 LAB
CULTURE: ABNORMAL
Lab: ABNORMAL
SPECIMEN DESCRIPTION: ABNORMAL

## 2021-11-30 ENCOUNTER — HOSPITAL ENCOUNTER (OUTPATIENT)
Age: 21
Setting detail: SPECIMEN
Discharge: HOME | End: 2021-11-30
Payer: COMMERCIAL

## 2021-11-30 ENCOUNTER — OFFICE VISIT (OUTPATIENT)
Dept: OBGYN | Age: 21
End: 2021-11-30
Payer: COMMERCIAL

## 2021-11-30 VITALS
BODY MASS INDEX: 36.63 KG/M2 | DIASTOLIC BLOOD PRESSURE: 66 MMHG | SYSTOLIC BLOOD PRESSURE: 108 MMHG | WEIGHT: 194 LBS | HEIGHT: 61 IN

## 2021-11-30 DIAGNOSIS — N75.1 ABSCESS OF LEFT BARTHOLIN'S GLAND: ICD-10-CM

## 2021-11-30 DIAGNOSIS — N75.1 ABSCESS OF LEFT BARTHOLIN'S GLAND: Primary | ICD-10-CM

## 2021-11-30 PROCEDURE — 86403 PARTICLE AGGLUT ANTBDY SCRN: CPT

## 2021-11-30 PROCEDURE — 56405 I&D VULVA/PERINEAL ABSCESS: CPT | Performed by: ADVANCED PRACTICE MIDWIFE

## 2021-11-30 PROCEDURE — 87070 CULTURE OTHR SPECIMN AEROBIC: CPT

## 2021-11-30 PROCEDURE — 87205 SMEAR GRAM STAIN: CPT

## 2021-11-30 RX ORDER — CEPHALEXIN 500 MG/1
500 CAPSULE ORAL 3 TIMES DAILY
Qty: 21 CAPSULE | Refills: 0 | Status: SHIPPED | OUTPATIENT
Start: 2021-11-30 | End: 2021-12-07

## 2021-11-30 NOTE — PROGRESS NOTES
hard and painful. PE:  Vital Signs  Blood pressure 108/66, height 5' 1\" (1.549 m), weight 194 lb (88 kg), not currently breastfeeding. Estimated body mass index is 36.66 kg/m² as calculated from the following:    Height as of this encounter: 5' 1\" (1.549 m). Weight as of this encounter: 194 lb (88 kg). No data recorded    NURSE: Latrice PARKER    HPI: here for vulvar bump and pain     PT denies fever, chills, nausea and vomiting       Objective   No acute distress  Excellent communications  Well-nourished  Lymphatic:   no lymphadenopathy     Pelvic Exam: GENITAL/URINARY:  External Genitalia:  Hair distribution; normal, large golf ball size left bartholin abscess, very scant oozing blood from center    Site cleaned with betadine, attempt to inject with 1% lidocaine wheel although not very effective with inflamation, incised at 5 oclock l. Majora (near already oozing site) with #10 blade a 3-4 mm incision, immediately began to drain odorous pus and culture obtained externally. Expressed for large amounts, covered with 2x2 loose gauze and neosporin, care instructions reviewed                       Results reviewed today:    No results found for this visit on 11/30/21. Assessment and Plan          Diagnosis Orders   1. Abscess of left Bartholin's gland  OH I&D OF VULVA/PERINEUM ABSCESS    Culture, Wound    cephALEXin (KEFLEX) 500 MG capsule       will cover with antibiotics and await culture results      I am having Shannon Laws start on cephALEXin. I am also having her maintain her topiramate, albuterol sulfate HFA, Flovent HFA, and fluconazole. We will continue to administer levonorgestrel. Return for will call with results. There are no Patient Instructions on file for this visit. Over 50% of time spent on counseling and care coordination on: see assessment and plan,  She was also counseled on her preventative health maintenance recommendations and follow-up.         FF time: 20 [facility]ley [facility] [place] APRN - CNM,11/30/2021 1:34 PM

## 2021-12-02 LAB
CULTURE: ABNORMAL
DIRECT EXAM: ABNORMAL
DIRECT EXAM: ABNORMAL
Lab: ABNORMAL
SPECIMEN DESCRIPTION: ABNORMAL

## 2021-12-13 ENCOUNTER — TELEPHONE (OUTPATIENT)
Dept: OBGYN | Age: 21
End: 2021-12-13

## 2021-12-13 NOTE — TELEPHONE ENCOUNTER
Patient calls with concerns, patient had bartholin cyst that was drained several weeks ago still has hard lump and vaginal itching with painful intercourse . Recheck?

## 2022-02-07 ENCOUNTER — HOSPITAL ENCOUNTER (EMERGENCY)
Age: 22
Discharge: HOME | End: 2022-02-08
Attending: STUDENT IN AN ORGANIZED HEALTH CARE EDUCATION/TRAINING PROGRAM
Payer: COMMERCIAL

## 2022-02-07 DIAGNOSIS — M54.2 CERVICAL MUSCLE PAIN: ICD-10-CM

## 2022-02-07 DIAGNOSIS — R51.9 OCCIPITAL PAIN: Primary | ICD-10-CM

## 2022-02-07 PROCEDURE — 99283 EMERGENCY DEPT VISIT LOW MDM: CPT

## 2022-02-07 ASSESSMENT — PAIN SCALES - GENERAL: PAINLEVEL_OUTOF10: 6

## 2022-02-07 ASSESSMENT — PAIN DESCRIPTION - PAIN TYPE: TYPE: ACUTE PAIN

## 2022-02-07 ASSESSMENT — PAIN DESCRIPTION - LOCATION: LOCATION: HEAD

## 2022-02-07 NOTE — LETTER
[facility] ED  [address] [place] [address] [place] [address]  Phone: [phone]  Fax: [phone]               February 8, 2022    Patient: Harris Ronquillo   YOB: 2000   Date of Visit: 2/7/2022       To Whom It May Concern:    Josh Chun was seen and treated in our emergency department on 2/7/2022. She may return to work on 2/8/2022.       Sincerely,       Xocihlt Muhammad RN         Signature:__________________________________

## 2022-02-08 VITALS
DIASTOLIC BLOOD PRESSURE: 66 MMHG | TEMPERATURE: 97.8 F | SYSTOLIC BLOOD PRESSURE: 102 MMHG | HEART RATE: 75 BPM | OXYGEN SATURATION: 97 % | RESPIRATION RATE: 15 BRPM

## 2022-02-08 RX ORDER — NAPROXEN 500 MG/1
500 TABLET ORAL 2 TIMES DAILY
Qty: 30 TABLET | Refills: 0 | Status: SHIPPED | OUTPATIENT
Start: 2022-02-08

## 2022-02-08 NOTE — ED PROVIDER NOTES
EMERGENCY DEPARTMENT  -  VISIT NOTE    Date:     2/8/2022  Patient: Prosper Knight  MRN:    [id number]    Triage:    Chief Complaint   Patient presents with    Other     states bump on back on head, x2 months, states over last 24 hours it has increased in size causing pain     ------------------------------ HISTORY OF PRESENT ILLNESS --------------------------    : None required    The history is provided by the patient. Prosper Knight is a 23 y.o. female who presents to the ED for right occipital ridge prominence, tenderness to touch and neck pain at site of insertion. Onset over the last 2 months worse over the last few days. Patient is without history of direct trauma but has had a prominence there for some time. No area of erythema, warmth or drainage.   Improved by nothing attempted ibuprofen without relief  Worsened by palpation and range of motion of the neck  Associated with mild prominence of the right occipital ridge without evidence of infection    Denies fever chills cough, cold, abdominal pain nausea vomiting diarrhea  ----------------------------------- REVIEW OF SYSTEMS -------------------------------------  The following systems were reviewed:  Neurologic-posterior hip pain, no focal weakness or numbness  Constitutional-no fever or chills  Skin-no erythema warmth or drainage    All other ROS negative except as noted above  --------------------------------------- PAST HISTORY ------------------------------------------  Past Medical History:  Past Medical History:   Diagnosis Date    Asthma     pt has albuterol inhaler    Colitis     Colitis     Depression     2011, on meds    Dysmenorrhea in the adolescent     Facial abscess     was cyst left cheek    Rectal bleed     Vertigo      Past Surgical History:  Past Surgical History:   Procedure Laterality Date    ABSCESS DRAINAGE Left 2011    once left cheek    COLONOSCOPY      COLONOSCOPY  12/05/2013    Dr.Naddaf Shirley GUTIÉRREZ GASTROINTESTINAL ENDOSCOPY  12/05/2013         Social History:  Social History     Socioeconomic History    Marital status: Single     Spouse name: Not on file    Number of children: Not on file    Years of education: Not on file    Highest education level: Not on file   Occupational History    Not on file   Tobacco Use    Smoking status: Current Every Day Smoker     Packs/day: 1.00     Years: 2.00     Pack years: 2.00     Types: Cigarettes    Smokeless tobacco: Never Used   Vaping Use    Vaping Use: Never used   Substance and Sexual Activity    Alcohol use: No     Alcohol/week: 0.0 standard drinks     Comment: occasionally with family, hardly ever    Drug use: No    Sexual activity: Yes     Partners: Male     Birth control/protection: I.U.D. Other Topics Concern    Not on file   Social History Narrative    Not on file     Social Determinants of Health     Financial Resource Strain:     Difficulty of Paying Living Expenses: Not on file   Food Insecurity:     Worried About Running Out of Food in the Last Year: Not on file    Ángel of Food in the Last Year: Not on file   Transportation Needs:     Lack of Transportation (Medical): Not on file    Lack of Transportation (Non-Medical):  Not on file   Physical Activity:     Days of Exercise per Week: Not on file    Minutes of Exercise per Session: Not on file   Stress:     Feeling of Stress : Not on file   Social Connections:     Frequency of Communication with Friends and Family: Not on file    Frequency of Social Gatherings with Friends and Family: Not on file    Attends Cheondoism Services: Not on file    Active Member of Clubs or Organizations: Not on file    Attends Club or Organization Meetings: Not on file    Marital Status: Not on file   Intimate Partner Violence:     Fear of Current or Ex-Partner: Not on file    Emotionally Abused: Not on file    Physically Abused: Not on file    Sexually Abused: Not on file   Housing Stability:     Unable to Pay for Housing in the Last Year: Not on file    Number of Places Lived in the Last Year: Not on file    Unstable Housing in the Last Year: Not on file     Medications: The patient's home medications have been reviewed. Allergies:   Patient has no known allergies. ------------------------------------- PHYSICAL EXAM -----------------------------------------  BP (!) 152/99   Pulse 75   Temp 97.8 °F (36.6 °C) (Tympanic)   Resp 15   SpO2 97%     Constitutional: Alert, awake  HENT: mucous membranes moist  Posterior occipital ridge with a 1 x 1 cm bony prominence without erythema, fluctuance or open wound. This area is mildly tender to the touch. It is not warm. There is associated tenderness down the right lateral cervical muscles inserting along the right lateral aspect of the occipital ridge  Is no midline cervical tenderness  Eyes: no discharge, nonicteric  Neck:  neck supple, trachea midline  Lungs: no respiratory distress  Heart: RRR , no peripheral edema  Extremities: normal peripheral perfusion  Neuro: Alert and oriented, normal speech, MERCADO, ambulates  Patient's gross sensation and motor is intact  Skin: warm and dry  Psych: cooperative, appropriate thought content and judgement    --------------------------------- MEDICAL DECISION MAKING -------------------------------  Vital Signs: Reviewed the patients vital signs. Nursing Notes: Reviewed and utilized the nursing notes. Nursing triage and assessment notes reviewed and incorporated. Stevie Bussing of the Problem: New    ED Physician Core Measures: None indicated     Old Medical Records: The patient's available past medical records and past encounters were reviewed. Summary of pertinent elements include:  History of asthma depression    Additional Medical Decision Making:    Evaluation w/ HPI, physical exam, labs and studies as above. HD stable. Airway intact.         Presents with patient presents with 2-month history of right-sided occipital tenderness acutely worse recently. There is no sign of infection. The patient is neurologically intact. It is associated with some right-sided cervical muscle tenderness. This may represent an occipital headache or cervical strain. The patient is being followed as an outpatient with neurology for atypical headaches. She is in no distress. There are no headache red flags. Patient will be treated supportively with naproxen. Work note was provided per request..      Although not representing an exhaustive list of the differential diagnoses considered in this patient, to a reasonable degree of clinical confidence, HPI, PE, and studies not consistent with subarachnoid hemorrhage, epidural hemorrhage, subdural hemorrhage, cerebrovascular accident, central venous thrombosis, acute angle closure glaucoma, meningitis or sepsis and I have low suspicion for these etiologies at this time. ------------------------------ IMPRESSION AND DISPOSITION -----------------------------    IMPRESSION  1. Occipital pain  2. Right-sided cervical strain    Medical Screening Exam: The patient has received a medical screening examination and within reasonable clinical confidence an emergency medical condition was identified and has been stabilized    DISPOSITION  Discharge home in stable condition    ED Prescriptions:  New Prescriptions    NAPROXEN (NAPROSYN) 500 MG TABLET    Take 1 tablet by mouth 2 times daily     -------------------------------------- COUNSELING ------------------------------------------------     Counseling: Spoke with the patient and discussed todays findings, in addition to providing specific details for the plan of care and counseling regarding the diagnosis and prognosis. She was given the opportunity to ask questions. Discussed the return indications and importance of follow-up. Advised to follow-up with PCP.   Advised to return to the ED for changing/worsening symptoms, new symptoms, complaint specific precautions, and precautions listed on the pts discharge paperwork. Educated on the common potential side effects of medication to be prescribed. Renetta Mccormack D.O.     Attending Physician  Department of Emergency Medicine       Renetta Mccormack DO  02/08/22 [id number]

## 2022-02-17 ENCOUNTER — OFFICE VISIT (OUTPATIENT)
Dept: NEUROLOGY | Age: 22
End: 2022-02-17
Payer: COMMERCIAL

## 2022-02-17 VITALS
WEIGHT: 196.2 LBS | TEMPERATURE: 98.2 F | DIASTOLIC BLOOD PRESSURE: 66 MMHG | HEIGHT: 61 IN | HEART RATE: 93 BPM | BODY MASS INDEX: 37.04 KG/M2 | SYSTOLIC BLOOD PRESSURE: 116 MMHG | RESPIRATION RATE: 16 BRPM

## 2022-02-17 DIAGNOSIS — G43.709 CHRONIC MIGRAINE WITHOUT AURA WITHOUT STATUS MIGRAINOSUS, NOT INTRACTABLE: Primary | ICD-10-CM

## 2022-02-17 PROCEDURE — 99214 OFFICE O/P EST MOD 30 MIN: CPT | Performed by: NEUROMUSCULOSKELETAL MEDICINE, SPORTS MEDICINE

## 2022-02-17 PROCEDURE — 4004F PT TOBACCO SCREEN RCVD TLK: CPT | Performed by: NEUROMUSCULOSKELETAL MEDICINE, SPORTS MEDICINE

## 2022-02-17 PROCEDURE — G8417 CALC BMI ABV UP PARAM F/U: HCPCS | Performed by: NEUROMUSCULOSKELETAL MEDICINE, SPORTS MEDICINE

## 2022-02-17 PROCEDURE — G8427 DOCREV CUR MEDS BY ELIG CLIN: HCPCS | Performed by: NEUROMUSCULOSKELETAL MEDICINE, SPORTS MEDICINE

## 2022-02-17 PROCEDURE — G8484 FLU IMMUNIZE NO ADMIN: HCPCS | Performed by: NEUROMUSCULOSKELETAL MEDICINE, SPORTS MEDICINE

## 2022-02-17 RX ORDER — RIZATRIPTAN BENZOATE 10 MG/1
10 TABLET ORAL
Qty: 9 TABLET | Refills: 1 | Status: SHIPPED | OUTPATIENT
Start: 2022-02-17 | End: 2022-02-17

## 2022-02-17 NOTE — PROGRESS NOTES
NEUROLOGY FOLLOW UP    Patient Name:  Easton Rios  :   2000  Clinic Visit Date: 2022    I saw Ms. Easton Rios  in the neurology clinic today for migraine headaches. She was last seen in this office in 2021, when she was doing well on Topamax. After about 2 months she starting having symptoms of fatigue and excessive sleepiness so she discontinued the medication. Over the past  few months headaches have been relatively minor  and tolerable. She was  in the emergency room recently with pain behind the right ear without any other significant symptoms. REVIEW OF SYSTEMS    Constitutional Weight changes: absent, change in appetite: absent Fatigue: present; Fevers : absent, Any recent hospitalizations:  absent   HEENT Ears: RINGING,  Visual disturbance: absent   Respiratory Shortness of breath: absent, choking:  absent, Cough: absent, Snoring : absent   Cardiovascular Chest pain: absent, Leg swelling :absent, palpitations : absent, fainting : absent   GI Constipation: absent, Diarrhea: absent, Swallowing change: absent    Urinary frequency: absent, Urinary urgency: absent, Urinary incontinence: absent   Musculoskeletal Neck pain: absent, Back pain: absent, Stiffness: absent, Muscle pain: absent, Joint pain: absent, restless leg : absent   Dermatological Hair loss: absent, Skin changes: absent   Neurological Confusion: absent, Trouble concentrating: absent, Seizures: absent;  Memory loss: absent, balance problem: absent, Dizziness: present, vertigo: present, Weakness: present, Numbness absent, Tremor: absent, Spasm: absent, involuntary movement: absent, Speech difficulty: absent, Headache: present, Light sensitivity: present   Psychiatric Anxiety: absent, Depression  absent, drug abuse: absent, Hallucination: absent, mood disorder: absent, Suicidal ideations absent   Hematologic Abnormal bleeding: absent, Anemia: absent, Lymph gland changes: absent Clotting disorder: absent     Past Medical History:   Diagnosis Date    Asthma     pt has albuterol inhaler    Colitis     Colitis     Depression     2011, on meds    Dysmenorrhea in the adolescent     Facial abscess     was cyst left cheek    Rectal bleed     Vertigo        Past Surgical History:   Procedure Laterality Date    ABSCESS DRAINAGE Left 2011    once left cheek    COLONOSCOPY      COLONOSCOPY  12/05/2013        UPPER GASTROINTESTINAL ENDOSCOPY  12/05/2013           Social History     Socioeconomic History    Marital status: Single     Spouse name: Not on file    Number of children: Not on file    Years of education: Not on file    Highest education level: Not on file   Occupational History    Not on file   Tobacco Use    Smoking status: Current Every Day Smoker     Packs/day: 1.00     Years: 2.00     Pack years: 2.00     Types: Cigarettes    Smokeless tobacco: Never Used   Vaping Use    Vaping Use: Never used   Substance and Sexual Activity    Alcohol use: No     Alcohol/week: 0.0 standard drinks     Comment: occasionally with family, hardly ever    Drug use: No    Sexual activity: Yes     Partners: Male     Birth control/protection: I.U.D. Other Topics Concern    Not on file   Social History Narrative    Not on file     Social Determinants of Health     Financial Resource Strain:     Difficulty of Paying Living Expenses: Not on file   Food Insecurity:     Worried About Running Out of Food in the Last Year: Not on file    Ángel of Food in the Last Year: Not on file   Transportation Needs:     Lack of Transportation (Medical): Not on file    Lack of Transportation (Non-Medical):  Not on file   Physical Activity:     Days of Exercise per Week: Not on file    Minutes of Exercise per Session: Not on file   Stress:     Feeling of Stress : Not on file   Social Connections:     Frequency of Communication with Friends and Family: Not on file    Frequency of Social Gatherings with Friends and Family: Not on file    Attends Evangelical Services: Not on file    Active Member of Clubs or Organizations: Not on file    Attends Club or Organization Meetings: Not on file    Marital Status: Not on file   Intimate Partner Violence:     Fear of Current or Ex-Partner: Not on file    Emotionally Abused: Not on file    Physically Abused: Not on file    Sexually Abused: Not on file   Housing Stability:     Unable to Pay for Housing in the Last Year: Not on file    Number of [place] in the Last Year: Not on file    Unstable Housing in the Last Year: Not on file       Family History   Problem Relation Age of Onset    Cancer Mother         breast    Allergies Mother     Crohn's Disease Mother     High Cholesterol Father     Migraines Father     Stroke Father     Heart Disease Father     Cancer Father     Diabetes Father     Migraines Brother     Cancer Maternal Grandmother     Cancer Maternal Grandfather     Cancer Paternal Grandmother        Current Outpatient Medications   Medication Sig Dispense Refill    rizatriptan (MAXALT) 10 MG tablet Take 1 tablet by mouth once as needed for Migraine May repeat in 2 hours if needed 9 tablet 1    naproxen (NAPROSYN) 500 MG tablet Take 1 tablet by mouth 2 times daily 30 tablet 0    albuterol sulfate  (90 Base) MCG/ACT inhaler Inhale 1 puff into the lungs every 4 hours as needed       FLOVENT HFA 44 MCG/ACT inhaler Inhale 2 puffs into the lungs 2 times daily       topiramate (TOPAMAX) 25 MG tablet Take 1 tablet by mouth nightly (Patient not taking: Reported on 2/17/2022) 90 tablet 1     Current Facility-Administered Medications   Medication Dose Route Frequency Provider Last Rate Last Admin    levonorgestrel (MIRENA) IUD 52 mg 1 each  1 each IntraUTERine Once AHSAN Le CNM   1 each at 11/17/20 1702      DATA:  Lab Results   Component Value Date    WBC 14.7 (H) 09/22/2020    HGB 11.9 10/05/2020     09/22/2020    ALT <5 (L) 09/22/2020    AST 10 09/22/2020     09/22/2020    K 3.7 09/22/2020     09/22/2020    CREATININE 0.67 09/22/2020    BUN 6 09/22/2020    CO2 24 09/22/2020    TSH 0.81 03/16/2015    INR 0.9 10/10/2018    GLUF 96 06/15/2020       /66 (Site: Left Upper Arm, Position: Sitting, Cuff Size: Large Adult)   Pulse 93   Temp 98.2 °F (36.8 °C) (Temporal)   Resp 16   Ht 5' 1\" (1.549 m)   Wt 196 lb 3.2 oz (89 kg)   BMI 37.07 kg/m²     NEUROLOGICAL EXAMINATION:     MENTAL STATUS:.  Normal    CRANIAL NERVES: II-XII are normal.    MOTOR EXAMINATION: Muscle tone is normal in all the limbs. Strength is 5/5 in both upper and lower limbs. No abnormal limb movements. SENSORY EXAMINATION: Normal.     STRETCH REFLEXES: Symmetrical in both the upper and lower limbs. GAIT:.Normal    IMPRESSION:.Migraine headaches. Improved without any prophylactic medications. At this time  the headaches are very minor and do not warrant any prophylactic medications, unless symptoms worsen in the future . PLAN:     1. Maxalt 10 mg as needed for severe migraine headaches. 2. Advised to maintain a headache diary. 3. Follow-up in 3-months       NOTE: This neurology evaluation is part of outpatient coverage at [facility]  1-2 days per week. Patients requiring frequent evaluations or uncomfortable with potential 3-4 day turnaround on questions or calls  may be better served by a neurologist in the area full time. [facility] at [address] [place] [address]. Shantell/Darlene is available for outpatient visits and procedures including EMG/NCS. Non-[facility] neurologists also practice in [facility] (Dr. Candida Toribio) and [facility] (Susan Johnson).        Len Duong MD   2/17/2022  3:05 PM

## 2022-02-17 NOTE — PATIENT INSTRUCTIONS
Next visit 4/21/21 yson  Last visit 11/17/20 yson     SURVEY:    You may be receiving a survey from [employer] regarding your visit today. Please complete the survey to enable us to provide the highest quality of care to you and your family. If you cannot score us a very good on any question, please call the office to discuss how we could have made your experience a very good one. Thank you.

## 2022-05-17 RX ORDER — FLUCONAZOLE 150 MG/1
150 TABLET ORAL ONCE
Qty: 1 TABLET | Refills: 0 | Status: SHIPPED | OUTPATIENT
Start: 2022-05-17 | End: 2022-05-17

## 2022-05-17 NOTE — PROGRESS NOTES
Per evisit questionnaire, symptoms likely consistent with VC  5-10 minutes were spent on the digital evaluation and management of this patient. Assessment and Plan          Diagnosis Orders   1. Acute vaginitis  fluconazole (DIFLUCAN) 150 MG tablet       if symptoms not resolved will need seen in office      I am having Shannon Jaquez start on fluconazole. I am also having her maintain her topiramate. We will continue to administer levonorgestrel. There are no Patient Instructions on file for this visit.           AHSAN Brown CNM,5/17/2022 4:53 PM

## 2022-07-24 ENCOUNTER — APPOINTMENT (OUTPATIENT)
Dept: GENERAL RADIOLOGY | Age: 22
End: 2022-07-24
Payer: COMMERCIAL

## 2022-07-24 ENCOUNTER — HOSPITAL ENCOUNTER (EMERGENCY)
Age: 22
Discharge: HOME | End: 2022-07-24
Attending: EMERGENCY MEDICINE
Payer: COMMERCIAL

## 2022-07-24 VITALS
TEMPERATURE: 97.1 F | HEART RATE: 90 BPM | WEIGHT: 190 LBS | SYSTOLIC BLOOD PRESSURE: 118 MMHG | OXYGEN SATURATION: 98 % | DIASTOLIC BLOOD PRESSURE: 79 MMHG | BODY MASS INDEX: 35.9 KG/M2 | RESPIRATION RATE: 16 BRPM

## 2022-07-24 DIAGNOSIS — J45.901 EXACERBATION OF ASTHMA, UNSPECIFIED ASTHMA SEVERITY, UNSPECIFIED WHETHER PERSISTENT: Primary | ICD-10-CM

## 2022-07-24 LAB
SARS-COV-2, RAPID: NOT DETECTED
SPECIMEN DESCRIPTION: NORMAL

## 2022-07-24 PROCEDURE — 71045 X-RAY EXAM CHEST 1 VIEW: CPT

## 2022-07-24 PROCEDURE — 99284 EMERGENCY DEPT VISIT MOD MDM: CPT

## 2022-07-24 PROCEDURE — 87635 SARS-COV-2 COVID-19 AMP PRB: CPT

## 2022-07-24 PROCEDURE — 6370000000 HC RX 637 (ALT 250 FOR IP): Performed by: EMERGENCY MEDICINE

## 2022-07-24 PROCEDURE — C9803 HOPD COVID-19 SPEC COLLECT: HCPCS

## 2022-07-24 RX ORDER — ALBUTEROL SULFATE 90 UG/1
1 AEROSOL, METERED RESPIRATORY (INHALATION) EVERY 4 HOURS PRN
Qty: 18 G | Refills: 2 | Status: SHIPPED | OUTPATIENT
Start: 2022-07-24

## 2022-07-24 RX ORDER — PREDNISONE 20 MG/1
40 TABLET ORAL DAILY
Qty: 8 TABLET | Refills: 0 | Status: SHIPPED | OUTPATIENT
Start: 2022-07-25 | End: 2022-07-29

## 2022-07-24 RX ORDER — PREDNISONE 20 MG/1
40 TABLET ORAL ONCE
Status: COMPLETED | OUTPATIENT
Start: 2022-07-24 | End: 2022-07-24

## 2022-07-24 RX ADMIN — PREDNISONE 40 MG: 20 TABLET ORAL at 11:17

## 2022-07-24 ASSESSMENT — ENCOUNTER SYMPTOMS
COUGH: 1
SORE THROAT: 1
VOMITING: 0
ABDOMINAL PAIN: 0
RHINORRHEA: 0
TROUBLE SWALLOWING: 0

## 2022-07-24 ASSESSMENT — PAIN - FUNCTIONAL ASSESSMENT: PAIN_FUNCTIONAL_ASSESSMENT: NONE - DENIES PAIN

## 2022-07-24 NOTE — DISCHARGE INSTRUCTIONS
Your COVID-19 testing today was NEGATIVE. Please take the prednisone as prescribed. You were given your first dose in the ED; do not begin taking your prescription doses until tomorrow. Use your inhaler as previously prescribed. Return to the ED for difficulty breathing, development of fever or other concerns. Please keep your currently scheduled physician appointment for later this week for reevaluation.

## 2022-07-24 NOTE — ED NOTES
Patient verbalized understanding of s/s to return to ER and home care. Aware that follow up with PCP is recommended. Prescriptions sent to pharmacy.       Erinn Shelton RN  07/24/22 [id number]

## 2022-07-24 NOTE — ED PROVIDER NOTES
[facility] ED  EMERGENCY DEPARTMENT ENCOUNTER      Pt Name: Connie Lane  MRN: [id number]  [place] 2000  Date of evaluation: 7/24/2022  Provider: Radha Bingham MD    CHIEF COMPLAINT       Chief Complaint   Patient presents with    Cough     X3 weeks         HISTORY OF [address] is a 23 y.o. female with a history of asthma who presents to the emergency department for evaluation of about 3 weeks of cough. Cough is predominantly dry. No shortness of breath. No chest pain. Mild sore throat began this morning. States that she is having to use her albuterol MDI frequently but has only minimal improvement after using this. Has used multiple OTC medications with no improvement. No other complaints. REVIEW OF SYSTEMS       Review of Systems   Constitutional:  Negative for fever. HENT:  Positive for sore throat. Negative for congestion, rhinorrhea and trouble swallowing. Respiratory:  Positive for cough (Occasionally difficult to catch breath at the end of an episode of cough but no dyspnea otherwise. ). Cardiovascular:  Negative for chest pain. Gastrointestinal:  Negative for abdominal pain and vomiting. All other systems reviewed and are negative.       PAST MEDICAL HISTORY     Past Medical History:   Diagnosis Date    Asthma     pt has albuterol inhaler    Colitis     Colitis     Depression     2011, on meds    Dysmenorrhea in the adolescent     Facial abscess     was cyst left cheek    Rectal bleed     Vertigo          SURGICAL HISTORY       Past Surgical History:   Procedure Laterality Date    ABSCESS DRAINAGE Left 2011    once left cheek    COLONOSCOPY      COLONOSCOPY  12/05/2013        UPPER GASTROINTESTINAL ENDOSCOPY  12/05/2013             CURRENT MEDICATIONS       Previous Medications    FLOVENT HFA 44 MCG/ACT INHALER    Inhale 2 puffs into the lungs 2 times daily     NAPROXEN (NAPROSYN) 500 MG TABLET    Take 1 tablet by mouth 2 times daily    RIZATRIPTAN (MAXALT) 10 MG TABLET    Take 1 tablet by mouth once as needed for Migraine May repeat in 2 hours if needed    TOPIRAMATE (TOPAMAX) 25 MG TABLET    Take 1 tablet by mouth nightly       ALLERGIES       Patient has no known allergies. FAMILY HISTORY       Family History   Problem Relation Age of Onset    Cancer Mother         breast    Allergies Mother     Crohn's Disease Mother     High Cholesterol Father     Migraines Father     Stroke Father     Heart Disease Father     Cancer Father     Diabetes Father     Migraines Brother     Cancer Maternal Grandmother     Cancer Maternal Grandfather     Cancer Paternal Grandmother           SOCIAL HISTORY       Social History     Tobacco Use    Smoking status: Every Day     Packs/day: 0.50     Years: 2.00     Pack years: 1.00     Types: Cigarettes    Smokeless tobacco: Never   Vaping Use    Vaping Use: Never used   Substance Use Topics    Alcohol use: No     Alcohol/week: 0.0 standard drinks     Comment: occasionally with family, hardly ever    Drug use: No         PHYSICAL EXAM       ED Triage Vitals [07/24/22 1009]   BP Temp Temp Source Heart Rate Resp SpO2 Height Weight   118/79 97.1 °F (36.2 °C) Tympanic 90 16 98 % -- 190 lb (86.2 kg)       Physical Exam  Vitals reviewed. Constitutional:       Appearance: She is not ill-appearing or toxic-appearing. HENT:      Head: Normocephalic and atraumatic. Nose: Nose normal.      Mouth/Throat:      Mouth: Mucous membranes are moist.      Pharynx: Oropharynx is clear. No oropharyngeal exudate or posterior oropharyngeal erythema. Eyes:      General: No scleral icterus. Right eye: No discharge. Left eye: No discharge. Conjunctiva/sclera: Conjunctivae normal.   Cardiovascular:      Rate and Rhythm: Normal rate and regular rhythm. Heart sounds: No murmur heard. Pulmonary:      Effort: Pulmonary effort is normal. No respiratory distress.       Breath sounds: No stridor or decreased air movement. Wheezing (Scattered bilateral) present. No rhonchi or rales. Abdominal:      General: There is no distension. Palpations: Abdomen is soft. Tenderness: There is no abdominal tenderness. There is no guarding or rebound. Musculoskeletal:      Right lower leg: No edema. Left lower leg: No edema. Skin:     General: Skin is warm and dry. Neurological:      Mental Status: She is alert. DIAGNOSTIC RESULTS       RADIOLOGY:     Interpretation per the Radiologist below, if available at the time of this note:    XR CHEST PORTABLE   Final Result   No acute cardiopulmonary process. LABS:  Labs Reviewed   COVID-19, RAPID       All other labs were within normal range or not returned as of this dictation. EMERGENCY DEPARTMENT COURSE and DIFFERENTIAL DIAGNOSIS/MDM:     Patient stable and well-appearing. Scattered wheezes bilaterally but pulmonary exam is otherwise unremarkable. Moving air without difficulty. Normal SPO2 on room air. Afebrile. COVID-19 testing here is negative. Chest x-ray is unremarkable. I do not feel that antibiotics are warranted at this time. Rather, I suspect likely asthma exacerbation. First dose of prednisone given in the ED; will continue for 4 additional days as an outpatient. Patient plans to follow-up with her PCP later this week. Advised to keep that appointment for reevaluation. Given appropriate return precautions. Patient amenable to plan. Albuterol MDI refilled during this visit. Does not have a significant asthma exacerbation requiring aggressive treatment in the ED. FINAL IMPRESSION      1. Exacerbation of asthma, unspecified asthma severity, unspecified whether persistent          DISPOSITION/PLAN     DISPOSITION Decision To Discharge 07/24/2022 10:48:21 AM      DISCHARGE MEDICATIONS:  New Prescriptions    PREDNISONE (DELTASONE) 20 MG TABLET    Take 2 tablets by mouth in the morning for 4 days. (Please note that portions of this note were completed with a voice recognition program.  Efforts were made to edit the dictations but occasionally words are mis-transcribed.)    Aysha Garcia MD (electronically signed)  Attending Emergency Physician           Aysha Garcia MD  07/24/22 [phone]

## 2022-11-10 ENCOUNTER — OFFICE VISIT (OUTPATIENT)
Dept: OBGYN | Age: 22
End: 2022-11-10
Payer: COMMERCIAL

## 2022-11-10 ENCOUNTER — HOSPITAL ENCOUNTER (OUTPATIENT)
Age: 22
Setting detail: SPECIMEN
Discharge: HOME | End: 2022-11-10
Payer: COMMERCIAL

## 2022-11-10 VITALS
DIASTOLIC BLOOD PRESSURE: 80 MMHG | WEIGHT: 192 LBS | HEIGHT: 61 IN | SYSTOLIC BLOOD PRESSURE: 118 MMHG | BODY MASS INDEX: 36.25 KG/M2

## 2022-11-10 DIAGNOSIS — R10.2 PELVIC PAIN: Primary | ICD-10-CM

## 2022-11-10 DIAGNOSIS — R10.2 PELVIC PAIN: ICD-10-CM

## 2022-11-10 DIAGNOSIS — Z31.9 PROCREATIVE MANAGEMENT: ICD-10-CM

## 2022-11-10 PROCEDURE — 58301 REMOVE INTRAUTERINE DEVICE: CPT | Performed by: ADVANCED PRACTICE MIDWIFE

## 2022-11-10 PROCEDURE — 87591 N.GONORRHOEAE DNA AMP PROB: CPT

## 2022-11-10 PROCEDURE — 87070 CULTURE OTHR SPECIMN AEROBIC: CPT

## 2022-11-10 PROCEDURE — 87491 CHLMYD TRACH DNA AMP PROBE: CPT

## 2022-11-10 NOTE — PROGRESS NOTES
PROBLEM VISIT     Date of service: 11/10/2022    Ene White  Is a 23 y.o. , female    PT's PCP is: No primary care provider on file. : 2000                                             Subjective:       No LMP recorded. (Menstrual status: Other - See Notes). OB History    Para Term  AB Living   2 2 2     2   SAB IAB Ectopic Molar Multiple Live Births           0 2      # Outcome Date GA Lbr Dionisio/2nd Weight Sex Delivery Anes PTL Lv   2 Term 20 39w0d 03:10  00:12 5 lb 6 oz (2.438 kg) F Vag-Spont EPI N GRACIELA   1 Term 19 39w2d 08:06  00:24 5 lb 5.3 oz (2.419 kg) F Vag-Spont EPI N GRACIELA        Social History     Tobacco Use   Smoking Status Every Day    Packs/day: 0.50    Years: 2.00    Pack years: 1.00    Types: Cigarettes   Smokeless Tobacco Never        Social History     Substance and Sexual Activity   Alcohol Use No    Alcohol/week: 0.0 standard drinks    Comment: occasionally with family, hardly ever       Allergies: Patient has no known allergies. Current Outpatient Medications:     albuterol sulfate HFA (PROVENTIL;VENTOLIN;PROAIR) 108 (90 Base) MCG/ACT inhaler, Inhale 1 puff into the lungs every 4 hours as needed for Wheezing or Shortness of Breath, Disp: 18 g, Rfl: 2    naproxen (NAPROSYN) 500 MG tablet, Take 1 tablet by mouth 2 times daily, Disp: 30 tablet, Rfl: 0    FLOVENT HFA 44 MCG/ACT inhaler, Inhale 2 puffs into the lungs 2 times daily , Disp: , Rfl:     rizatriptan (MAXALT) 10 MG tablet, Take 1 tablet by mouth once as needed for Migraine May repeat in 2 hours if needed, Disp: 9 tablet, Rfl: 1    topiramate (TOPAMAX) 25 MG tablet, Take 1 tablet by mouth nightly (Patient not taking: Reported on 2022), Disp: 90 tablet, Rfl: 1    Social History     Substance and Sexual Activity   Sexual Activity Yes    Partners: Male    Birth control/protection: I.U.D.        Last Yearly:  2020    Last pap: 2020    Last HPV: never    Have you had a positive covid test: No    Have you had the covid immunization: No    Chief Complaint   Patient presents with    Pelvic Pain     Follow up in house ultrasound. Concerns with generalized pelvic pain with intercourse and spotting. Patient has  Mirena IUD that was placed 11/17/2020. Discuss possible removal.         PE:  Vital Signs  Blood pressure 118/80, height 5' 1\" (1.549 m), weight 192 lb (87.1 kg), not currently breastfeeding. Estimated body mass index is 36.28 kg/m² as calculated from the following:    Height as of this encounter: 5' 1\" (1.549 m). Weight as of this encounter: 192 lb (87.1 kg). No data recorded    NURSE MIKE Keyes LPN    HPI: here for chronic pelvic pain; desires IUd removal patient desires pregnancy     PT denies fever, chills, nausea and vomiting       Objective   No acute distress  Excellent communications  Well-nourished    Pelvic Exam: GENITAL/URINARY:  External Genitalia:  General appearance; normal, Hair distribution; normal, Lesions absent  Urethral Meatus:  Size normal, Location normal, Lesions absent, Prolapse absent  Urethra: Fullness absent, Masses absent  Bladder:  Fullness absent, Masses absent, Tenderness absent, Cystocele absent  Vagina:  General appearance normal, Estrogen effect normal, Lesions absent, Pelvic support normal, white curdish discharge, no odor  Cervix:  General appearance normal, Lesions absent, Discharge absent, Tenderness absent, Enlargement absent, Nodularity absent, strings from os grapsed with Larisa forceps and IUD removed entirely and intact  Uterus:  Size normal  [place]: Masses absent  Anus/Perineum:  Lesions absent and Masses absent                                                           Results reviewed today:  Sono:  UTERUS:anteverted, homogenous echo pattern. IUD visualized in correct position. ENDO:5mm     RT. OVARY:seen, simple cyst measures 2.4 x 2.1 x 1.7 cm.     LT. OVARY:seen, wnl.      Small amount of free fluid visualized in posterior cul-de-sac. No results found for this visit on 11/10/22. Assessment and Plan          Diagnosis Orders   1. Pelvic pain  C.trachomatis N.gonorrhoeae DNA    Culture, Genital    AK REMOVE INTRAUTERINE DEVICE      2. Procreative management                  I am having Shannon Lindsey maintain her topiramate, Flovent HFA, naproxen, rizatriptan, and albuterol sulfate HFA. We will stop administering levonorgestrel. Return if symptoms worsen or fail to improve. There are no Patient Instructions on file for this visit.     Time spent 20 minutes      AHSAN Cerda CNM,11/10/2022 9:09 AM

## 2022-11-13 LAB
CULTURE: NORMAL
SPECIMEN DESCRIPTION: NORMAL

## 2022-11-15 RX ORDER — RIZATRIPTAN BENZOATE 10 MG/1
10 TABLET ORAL
Qty: 9 TABLET | Refills: 1 | OUTPATIENT
Start: 2022-11-15 | End: 2022-11-15

## 2022-11-17 ENCOUNTER — HOSPITAL ENCOUNTER (EMERGENCY)
Age: 22
Discharge: HOME | End: 2022-11-17
Payer: COMMERCIAL

## 2022-11-17 ENCOUNTER — APPOINTMENT (OUTPATIENT)
Dept: GENERAL RADIOLOGY | Age: 22
End: 2022-11-17
Payer: COMMERCIAL

## 2022-11-17 VITALS
HEIGHT: 61 IN | HEART RATE: 83 BPM | OXYGEN SATURATION: 97 % | BODY MASS INDEX: 35.87 KG/M2 | TEMPERATURE: 97.3 F | DIASTOLIC BLOOD PRESSURE: 79 MMHG | RESPIRATION RATE: 18 BRPM | WEIGHT: 190 LBS | SYSTOLIC BLOOD PRESSURE: 113 MMHG

## 2022-11-17 DIAGNOSIS — J40 BRONCHITIS: Primary | ICD-10-CM

## 2022-11-17 LAB
FLU A ANTIGEN: NEGATIVE
FLU B ANTIGEN: NEGATIVE
SARS-COV-2, RAPID: NOT DETECTED
SPECIMEN DESCRIPTION: NORMAL

## 2022-11-17 PROCEDURE — 87635 SARS-COV-2 COVID-19 AMP PRB: CPT

## 2022-11-17 PROCEDURE — 87804 INFLUENZA ASSAY W/OPTIC: CPT

## 2022-11-17 PROCEDURE — 71045 X-RAY EXAM CHEST 1 VIEW: CPT

## 2022-11-17 PROCEDURE — 99284 EMERGENCY DEPT VISIT MOD MDM: CPT

## 2022-11-17 PROCEDURE — C9803 HOPD COVID-19 SPEC COLLECT: HCPCS

## 2022-11-17 RX ORDER — ALBUTEROL SULFATE 90 UG/1
2 AEROSOL, METERED RESPIRATORY (INHALATION) 4 TIMES DAILY PRN
Qty: 54 G | Refills: 1 | Status: SHIPPED | OUTPATIENT
Start: 2022-11-17

## 2022-11-17 RX ORDER — AZITHROMYCIN 250 MG/1
250 TABLET, FILM COATED ORAL SEE ADMIN INSTRUCTIONS
Qty: 6 TABLET | Refills: 0 | Status: SHIPPED | OUTPATIENT
Start: 2022-11-17 | End: 2022-11-22

## 2022-11-17 ASSESSMENT — LIFESTYLE VARIABLES
HOW OFTEN DO YOU HAVE A DRINK CONTAINING ALCOHOL: NEVER
HOW MANY STANDARD DRINKS CONTAINING ALCOHOL DO YOU HAVE ON A TYPICAL DAY: PATIENT DOES NOT DRINK

## 2022-11-17 ASSESSMENT — PAIN DESCRIPTION - PAIN TYPE: TYPE: ACUTE PAIN

## 2022-11-17 ASSESSMENT — ENCOUNTER SYMPTOMS
COUGH: 1
GASTROINTESTINAL NEGATIVE: 1
EYES NEGATIVE: 1
SHORTNESS OF BREATH: 0

## 2022-11-17 ASSESSMENT — PAIN DESCRIPTION - DESCRIPTORS: DESCRIPTORS: ACHING

## 2022-11-17 ASSESSMENT — PAIN - FUNCTIONAL ASSESSMENT: PAIN_FUNCTIONAL_ASSESSMENT: 0-10

## 2022-11-17 ASSESSMENT — PAIN DESCRIPTION - LOCATION: LOCATION: HEAD

## 2022-11-17 ASSESSMENT — PAIN SCALES - GENERAL: PAINLEVEL_OUTOF10: 9

## 2022-11-17 NOTE — DISCHARGE INSTRUCTIONS
Follow-up with primary care doctor 7 days for reevaluation. Start Zithromax antibiotic as directed. Use albuterol inhaler as needed for wheezing. Promptly return to emergency department for new, changing, worsening of symptoms or other concerns.

## 2022-11-17 NOTE — ED PROVIDER NOTES
[facility] ED  EMERGENCY DEPARTMENT ENCOUNTER      Pt Name: Dion Bowen  MRN: [id number]  [place] 2000  Date of evaluation: 11/17/2022  Provider: Dane Kim Dr       Chief Complaint   Patient presents with    Cough     Onset 3-4 weeks ago, productive         HISTORY OF PRESENT ILLNESS   (Location/Symptom, Timing/Onset, Context/Setting, Quality, Duration, Modifying Factors, Severity)  Note limiting factors. Dion Bowen is a 23 y.o. female who presents to the emergency department major of asthma reports 4-week history of productive cough greenish-yellow sputum that is been lingering and persisted since onset. Patient reports the first week she had symptoms she had fever and chills noting she thinks she may have had the flu. She admits she is an active smoker. Patient denies fever chills at present chest pain acute shortness of breath nausea vomiting syncopal events or other complaints. Patient reports she was seen at urgent care few days ago given [employer] which have not helped much. Patient reports she just ran out of her albuterol MDI. No other symptoms noted, patient has no additional complaints at present. Upon initial presentation patient answering questions appropriately speaking in full sentences she demonstrates no acute distress. HPI    Nursing Notes were reviewed. REVIEW OF SYSTEMS    (2-9 systems for level 4, 10 or more for level 5)     Review of Systems   Constitutional: Negative. HENT: Negative. Eyes: Negative. Respiratory:  Positive for cough. Negative for shortness of breath. See hpi   Cardiovascular: Negative. Negative for chest pain. Gastrointestinal: Negative. Genitourinary: Negative. Neurological: Negative. Psychiatric/Behavioral: Negative. Except as noted above the remainder of the review of systems was reviewed and negative.        PAST MEDICAL HISTORY     Past Medical History:   Diagnosis Date    Asthma     pt has albuterol inhaler    Colitis     Colitis     Depression     2011, on meds    Dysmenorrhea in the adolescent     Facial abscess     was cyst left cheek    Rectal bleed     Vertigo          SURGICAL HISTORY       Past Surgical History:   Procedure Laterality Date    ABSCESS DRAINAGE Left 2011    once left cheek    COLONOSCOPY      COLONOSCOPY  12/05/2013        UPPER GASTROINTESTINAL ENDOSCOPY  12/05/2013             CURRENT MEDICATIONS       Previous Medications    ALBUTEROL SULFATE HFA (PROVENTIL;VENTOLIN;PROAIR) 108 (90 BASE) MCG/ACT INHALER    Inhale 1 puff into the lungs every 4 hours as needed for Wheezing or Shortness of Breath    FLOVENT HFA 44 MCG/ACT INHALER    Inhale 2 puffs into the lungs 2 times daily     NAPROXEN (NAPROSYN) 500 MG TABLET    Take 1 tablet by mouth 2 times daily    RIZATRIPTAN (MAXALT) 10 MG TABLET    Take 1 tablet by mouth once as needed for Migraine May repeat in 2 hours if needed    TOPIRAMATE (TOPAMAX) 25 MG TABLET    Take 1 tablet by mouth nightly       ALLERGIES     Patient has no known allergies. FAMILY HISTORY       Family History   Problem Relation Age of Onset    Cancer Mother         breast    Allergies Mother     Crohn's Disease Mother     High Cholesterol Father     Migraines Father     Stroke Father     Heart Disease Father     Cancer Father     Diabetes Father     Migraines Brother     Cancer Maternal Grandmother     Cancer Maternal Grandfather     Cancer Paternal Grandmother           SOCIAL HISTORY       Social History     Socioeconomic History    Marital status: Single     Spouse name: None    Number of children: None    Years of education: None    Highest education level: None   Tobacco Use    Smoking status: Every Day     Packs/day: 0.50     Years: 2.00     Pack years: 1.00     Types: Cigarettes    Smokeless tobacco: Never   Vaping Use    Vaping Use: Never used   Substance and Sexual Activity    Alcohol use:  No Alcohol/week: 0.0 standard drinks     Comment: occasionally with family, hardly ever    Drug use: No    Sexual activity: Yes     Partners: Male     Birth control/protection: I.U.D. SCREENINGS         [place] Coma Scale  Eye Opening: Spontaneous  Best Verbal Response: Oriented  Best Motor Response: Obeys commands  [place] Coma Scale Score: 15                     CIWA Assessment  BP: 113/79  Heart Rate: 83                 PHYSICAL EXAM    (up to 7 for level 4, 8 or more for level 5)     ED Triage Vitals   BP Temp Temp Source Heart Rate Resp SpO2 Height Weight   11/17/22 1509 11/17/22 1601 11/17/22 1601 11/17/22 1505 11/17/22 1505 11/17/22 1505 11/17/22 1505 11/17/22 1505   98/79 97.3 °F (36.3 °C) Tympanic 100 18 100 % 5' 1\" (1.549 m) 190 lb (86.2 kg)       Physical Exam  Vitals and nursing note reviewed. Constitutional:       Appearance: Normal appearance. HENT:      Head: Normocephalic. Nose: Nose normal.      Mouth/Throat:      Mouth: Mucous membranes are moist.   Cardiovascular:      Rate and Rhythm: Normal rate and regular rhythm. Pulmonary:      Effort: Pulmonary effort is normal.      Breath sounds: Normal breath sounds. No wheezing. Abdominal:      Palpations: Abdomen is soft. Musculoskeletal:         General: Normal range of motion. Cervical back: Normal range of motion. Skin:     General: Skin is warm. Capillary Refill: Capillary refill takes less than 2 seconds. Neurological:      General: No focal deficit present. Mental Status: She is alert and oriented to person, place, and time.    Psychiatric:         Mood and Affect: Mood normal.         Behavior: Behavior normal.       DIAGNOSTIC RESULTS       RADIOLOGY:   Non-plain film images such as CT, Ultrasound and MRI are read by the radiologist. Plain radiographic images are visualized and preliminarily interpreted by the emergency physician with the below findings:        Interpretation per the Radiologist below, if available at the time of this note:    XR CHEST PORTABLE   Final Result   No acute process. ED BEDSIDE ULTRASOUND:   Performed by ED Physician - none    LABS:  Labs Reviewed   COVID-19, RAPID   RAPID INFLUENZA A/B ANTIGENS       All other labs were within normal range or not returned as of this dictation. EMERGENCY DEPARTMENT COURSE and DIFFERENTIAL DIAGNOSIS/MDM:   Vitals:    Vitals:    11/17/22 1646 11/17/22 1651 11/17/22 1701 11/17/22 1705   BP:    113/79   Pulse:    83   Resp:    18   Temp:       TempSrc:       SpO2: 94% 94% 95% 97%   Weight:       Height:               MDM     Amount and/or Complexity of Data Reviewed  Clinical lab tests: ordered and reviewed  Tests in the radiology section of CPT®: ordered and reviewed          REASSESSMENT      Patient has had uncomplicated ER course I discussed with patient need to follow-up with primary care doctor. Patient will be prescribed azithromycin and refill her albuterol MDI inhaler. Patient demonstrates no dangerous process return precautions discussed patient. CRITICAL CARE TIME   Total Critical Care time was  minutes, excluding separately reportable procedures. There was a high probability of clinically significant/life threatening deterioration in the patient's condition which required my urgent intervention. CONSULTS:  None    PROCEDURES:  Unless otherwise noted below, none     Procedures        FINAL IMPRESSION      1.  Bronchitis Stable         DISPOSITION/PLAN   DISPOSITION Decision To Discharge 11/17/2022 05:19:05 PM      PATIENT REFERRED TO:  AHSAN Moore - CNP  [address] [place] [address]  [phone]    Schedule an appointment as soon as possible for a visit in 1 week      DISCHARGE MEDICATIONS:  New Prescriptions    ALBUTEROL SULFATE HFA (VENTOLIN HFA) 108 (90 BASE) MCG/ACT INHALER    Inhale 2 puffs into the lungs 4 times daily as needed for Wheezing    AZITHROMYCIN (ZITHROMAX) 250 MG TABLET    Take 1 tablet by mouth See Admin Instructions for 5 days 500mg on day 1 followed by 250mg on days 2 - 5     Controlled Substances Monitoring:     No flowsheet data found. (Please note that portions of this note were completed with a voice recognition program.  Efforts were made to edit the dictations but occasionally words are mis-transcribed.)    Venita Saldaña PA-C (electronically signed)  Attending Emergency Physician           Venita Saldaña PA-C  11/17/22 [facility]  eMERGENCY dEPARTMENT eNCOUnter     Pt Name: Dion Bowen  MRN: [id number]  [place] 2000  Date of evaluation: 11/27/22    This visit was performed by both a physician and an APC. I performed all aspects of the MDM as documented.   Electronically signed by Mildred Rose MD on 11/27/2022 at 8:22 PM  Attending Emergency  Physician        Mildred Rose MD  11/27/22 2022

## 2023-01-12 ENCOUNTER — HOSPITAL ENCOUNTER (OUTPATIENT)
Age: 23
Setting detail: SPECIMEN
Discharge: HOME | End: 2023-01-12

## 2023-01-13 LAB — VZV IGG SER QL IA: 2.53

## 2023-04-05 ENCOUNTER — HOSPITAL ENCOUNTER (EMERGENCY)
Age: 23
Discharge: HOME | End: 2023-04-05

## 2023-04-05 VITALS
SYSTOLIC BLOOD PRESSURE: 115 MMHG | DIASTOLIC BLOOD PRESSURE: 61 MMHG | BODY MASS INDEX: 37.79 KG/M2 | RESPIRATION RATE: 16 BRPM | WEIGHT: 200 LBS | TEMPERATURE: 98.6 F | OXYGEN SATURATION: 98 % | HEART RATE: 93 BPM

## 2023-04-05 DIAGNOSIS — K04.7 DENTAL INFECTION: ICD-10-CM

## 2023-04-05 DIAGNOSIS — K05.10 GINGIVITIS: Primary | ICD-10-CM

## 2023-04-05 RX ORDER — CHLORHEXIDINE GLUCONATE 0.12 MG/ML
15 RINSE ORAL 3 TIMES DAILY
Qty: 630 ML | Refills: 0 | Status: SHIPPED | OUTPATIENT
Start: 2023-04-05 | End: 2023-04-19

## 2023-04-05 RX ORDER — PENICILLIN V POTASSIUM 500 MG/1
500 TABLET ORAL 4 TIMES DAILY
Qty: 28 TABLET | Refills: 0 | Status: SHIPPED | OUTPATIENT
Start: 2023-04-05 | End: 2023-04-12

## 2023-04-05 ASSESSMENT — PAIN DESCRIPTION - PAIN TYPE: TYPE: ACUTE PAIN

## 2023-04-05 ASSESSMENT — PAIN DESCRIPTION - ORIENTATION: ORIENTATION: LEFT

## 2023-04-05 ASSESSMENT — PAIN DESCRIPTION - DESCRIPTORS: DESCRIPTORS: ACHING

## 2023-04-05 ASSESSMENT — PAIN DESCRIPTION - LOCATION: LOCATION: MOUTH

## 2023-04-05 ASSESSMENT — PAIN SCALES - GENERAL: PAINLEVEL_OUTOF10: 10

## 2023-04-05 ASSESSMENT — PAIN - FUNCTIONAL ASSESSMENT: PAIN_FUNCTIONAL_ASSESSMENT: 0-10

## 2023-04-05 ASSESSMENT — LIFESTYLE VARIABLES
HOW MANY STANDARD DRINKS CONTAINING ALCOHOL DO YOU HAVE ON A TYPICAL DAY: PATIENT DOES NOT DRINK
HOW OFTEN DO YOU HAVE A DRINK CONTAINING ALCOHOL: NEVER

## 2023-04-05 NOTE — ED PROVIDER NOTES
[facility] ED  eMERGENCY dEPARTMENT eNCOUnter      Pt Name: Laura Gordillo  MRN: [id number]  [place] 2000  Date of evaluation: 4/5/23      CHIEF COMPLAINT:   Chief Complaint   Patient presents with    Dental Problem     Patient states she is having problems with her teeth and gums to left upper and lower side, states has been getting worse over the last 3 weeks and cannot get into to see a dentist     [address] [place] [address] is a 20 y.o. female who presents with chronic dental pain left upper molars. Poor dentition throughout, cracked and decayed teeth. States that she noticed some swelling in her gums over the past couple days and presents for evaluation. Patient is about 4 weeks pregnant. Denies n/v/f/c/abd pain/CP/SOB. REVIEW OF SYSTEMS     Constitutional: Denies recent fever, chills. Eyes: No visual changes. Neck: No neck pain. Respiratory: Denies recent shortness of breath. Cardiac:  Denies recent chest pain. GI: denies any recent abdominal pain nausea or vomiting. Denies Blood in the stool or black tarry stools. : denies dysuria. Musculoskeletal: Denies focal weakness. Neurologic: denies headache or focal weakness. Skin:  Denies any rash. Negative in 10 essential Systems except as mentioned above and in the HPI. PAST MEDICAL HISTORY   PMH:  has a past medical history of Asthma, Colitis, Colitis, Depression, Dysmenorrhea in the adolescent, Facial abscess, Rectal bleed, and Vertigo. none otherwise stated in nurses notes  Surgical History:  has a past surgical history that includes Abscess Drainage (Left, 2011); Colonoscopy; Upper gastrointestinal endoscopy (12/05/2013); and Colonoscopy (12/05/2013). none otherwise stated in nurses notes  Social History:  reports that she has been smoking cigarettes. She has a 1.00 pack-year smoking history.  She has never used

## 2023-04-20 ENCOUNTER — HOSPITAL ENCOUNTER (EMERGENCY)
Age: 23
Discharge: HOME | End: 2023-04-20
Attending: EMERGENCY MEDICINE
Payer: COMMERCIAL

## 2023-04-20 VITALS
DIASTOLIC BLOOD PRESSURE: 73 MMHG | HEIGHT: 61 IN | TEMPERATURE: 97.8 F | SYSTOLIC BLOOD PRESSURE: 138 MMHG | BODY MASS INDEX: 35.87 KG/M2 | RESPIRATION RATE: 20 BRPM | HEART RATE: 90 BPM | OXYGEN SATURATION: 97 % | WEIGHT: 190 LBS

## 2023-04-20 DIAGNOSIS — K08.89 PAIN, DENTAL: Primary | ICD-10-CM

## 2023-04-20 PROCEDURE — 6370000000 HC RX 637 (ALT 250 FOR IP): Performed by: EMERGENCY MEDICINE

## 2023-04-20 PROCEDURE — 99283 EMERGENCY DEPT VISIT LOW MDM: CPT

## 2023-04-20 RX ORDER — LIDOCAINE HYDROCHLORIDE 20 MG/ML
4 SOLUTION OROPHARYNGEAL ONCE
Status: COMPLETED | OUTPATIENT
Start: 2023-04-20 | End: 2023-04-20

## 2023-04-20 RX ORDER — AMOXICILLIN 250 MG/1
500 CAPSULE ORAL ONCE
Status: COMPLETED | OUTPATIENT
Start: 2023-04-20 | End: 2023-04-20

## 2023-04-20 RX ORDER — AMOXICILLIN 500 MG/1
500 CAPSULE ORAL 2 TIMES DAILY
Qty: 14 CAPSULE | Refills: 0 | Status: SHIPPED | OUTPATIENT
Start: 2023-04-20 | End: 2023-04-27

## 2023-04-20 RX ORDER — ACETAMINOPHEN 325 MG/1
650 TABLET ORAL ONCE
Status: COMPLETED | OUTPATIENT
Start: 2023-04-20 | End: 2023-04-20

## 2023-04-20 RX ADMIN — ACETAMINOPHEN 650 MG: 325 TABLET ORAL at 21:54

## 2023-04-20 RX ADMIN — AMOXICILLIN 500 MG: 250 CAPSULE ORAL at 21:54

## 2023-04-20 RX ADMIN — LIDOCAINE HYDROCHLORIDE 4 ML: 20 SOLUTION ORAL at 21:54

## 2023-04-20 ASSESSMENT — PAIN DESCRIPTION - FREQUENCY: FREQUENCY: CONTINUOUS

## 2023-04-20 ASSESSMENT — PAIN DESCRIPTION - PAIN TYPE: TYPE: CHRONIC PAIN

## 2023-04-20 ASSESSMENT — PAIN - FUNCTIONAL ASSESSMENT: PAIN_FUNCTIONAL_ASSESSMENT: 0-10

## 2023-04-20 ASSESSMENT — PAIN DESCRIPTION - ORIENTATION: ORIENTATION: LEFT;UPPER

## 2023-04-20 ASSESSMENT — PAIN DESCRIPTION - LOCATION: LOCATION: JAW

## 2023-04-21 NOTE — DISCHARGE INSTRUCTIONS
Fill and take the antibiotic as prescribed. You can continue 650 mg of Tylenol every 6 hours for pain. You should also use the cotton balls soaked in the lidocaine solution. Call and follow-up with your family doctor and your OB as well as the dentist.  Return to the emergency department for new, worsening or worrisome symptoms.

## 2023-04-21 NOTE — ED PROVIDER NOTES
History    Marital status: Single     Spouse name: None    Number of children: None    Years of education: None    Highest education level: None   Tobacco Use    Smoking status: Every Day     Packs/day: 0.50     Years: 2.00     Pack years: 1.00     Types: Cigarettes    Smokeless tobacco: Never   Vaping Use    Vaping Use: Never used   Substance and Sexual Activity    Alcohol use: No     Alcohol/week: 0.0 standard drinks     Comment: occasionally with family, hardly ever    Drug use: No    Sexual activity: Yes     Partners: Male     Birth control/protection: I.U.D. SCREENINGS        [place] Coma Scale  Eye Opening: Spontaneous  Best Verbal Response: Oriented  Best Motor Response: Obeys commands  [place] Coma Scale Score: 15               PHYSICAL EXAM    (up to 7 for level 4, 8 or more for level 5)     ED Triage Vitals [04/20/23 2109]   BP Temp Temp Source Heart Rate Resp SpO2 Height Weight   138/73 97.8 °F (36.6 °C) Tympanic 90 20 97 % 5' 1\" (1.549 m) 190 lb (86.2 kg)       Physical Exam  Vitals and nursing note reviewed. Constitutional:       General: She is not in acute distress. Appearance: She is obese. She is not toxic-appearing. Comments: Patient is sitting in bed comfortably upon exam.  She answers questions appropriately in full sentences. HENT:      Mouth/Throat:      Comments: Very poor dentition throughout. No obvious signs of an abscess. Eyes:      Extraocular Movements: Extraocular movements intact. Pupils: Pupils are equal, round, and reactive to light. Musculoskeletal:      Cervical back: Normal range of motion and neck supple. Skin:     General: Skin is warm and dry. Neurological:      General: No focal deficit present. Mental Status: She is alert.    Psychiatric:         Mood and Affect: Mood normal.       DIAGNOSTIC RESULTS     EKG: All EKG's are interpreted by the Emergency Department Physician who either signs or Co-signs this chart in the absence of a

## 2023-04-27 ENCOUNTER — INITIAL PRENATAL (OUTPATIENT)
Dept: OBGYN | Age: 23
End: 2023-04-27
Payer: COMMERCIAL

## 2023-04-27 ENCOUNTER — HOSPITAL ENCOUNTER (OUTPATIENT)
Age: 23
Setting detail: SPECIMEN
Discharge: HOME | End: 2023-04-27
Payer: COMMERCIAL

## 2023-04-27 VITALS — WEIGHT: 185 LBS | DIASTOLIC BLOOD PRESSURE: 74 MMHG | SYSTOLIC BLOOD PRESSURE: 116 MMHG | BODY MASS INDEX: 34.96 KG/M2

## 2023-04-27 DIAGNOSIS — Z32.01 POSITIVE URINE PREGNANCY TEST: ICD-10-CM

## 2023-04-27 DIAGNOSIS — Z34.81 ENCOUNTER FOR SUPERVISION OF OTHER NORMAL PREGNANCY IN FIRST TRIMESTER: Primary | ICD-10-CM

## 2023-04-27 DIAGNOSIS — O99.211 OBESITY AFFECTING PREGNANCY IN FIRST TRIMESTER: ICD-10-CM

## 2023-04-27 DIAGNOSIS — N91.2 AMENORRHEA: ICD-10-CM

## 2023-04-27 DIAGNOSIS — Z3A.09 9 WEEKS GESTATION OF PREGNANCY: ICD-10-CM

## 2023-04-27 DIAGNOSIS — Z34.81 ENCOUNTER FOR SUPERVISION OF OTHER NORMAL PREGNANCY IN FIRST TRIMESTER: ICD-10-CM

## 2023-04-27 LAB
ABO/RH: NORMAL
ABSOLUTE EOS #: 0.2 K/UL (ref 0–0.44)
ABSOLUTE IMMATURE GRANULOCYTE: 0.08 K/UL (ref 0–0.3)
ABSOLUTE LYMPH #: 3.87 K/UL (ref 1.1–3.7)
ABSOLUTE MONO #: 0.76 K/UL (ref 0.1–1.2)
ANTIBODY SCREEN: NEGATIVE
BASOPHILS # BLD: 1 % (ref 0–2)
BASOPHILS ABSOLUTE: 0.09 K/UL (ref 0–0.2)
CONTROL: PRESENT
EOSINOPHILS RELATIVE PERCENT: 1 % (ref 1–4)
HBV SURFACE AG SER QL: NONREACTIVE
HCT VFR BLD AUTO: 40.5 % (ref 36.3–47.1)
HCV AB SER QL: NONREACTIVE
HGB BLD-MCNC: 13.4 G/DL (ref 11.9–15.1)
HIV 1+2 AB+HIV1 P24 AG SERPL QL IA: NONREACTIVE
IMMATURE GRANULOCYTES: 1 %
LYMPHOCYTES # BLD: 24 % (ref 24–43)
MCH RBC QN AUTO: 29.2 PG (ref 25.2–33.5)
MCHC RBC AUTO-ENTMCNC: 33.1 G/DL (ref 28.4–34.8)
MCV RBC AUTO: 88.2 FL (ref 82.6–102.9)
MONOCYTES # BLD: 5 % (ref 3–12)
NRBC AUTOMATED: 0 PER 100 WBC
PDW BLD-RTO: 13.7 % (ref 11.8–14.4)
PLATELET # BLD AUTO: 446 K/UL (ref 138–453)
PMV BLD AUTO: 10.5 FL (ref 8.1–13.5)
PREGNANCY TEST URINE, POC: POSITIVE
RBC # BLD: 4.59 M/UL (ref 3.95–5.11)
RUBV IGG SER QL: 330.9 IU/ML
SEG NEUTROPHILS: 68 % (ref 36–65)
SEGMENTED NEUTROPHILS ABSOLUTE COUNT: 10.97 K/UL (ref 1.5–8.1)
T PALLIDUM AB SER QL IA: NONREACTIVE
WBC # BLD AUTO: 16 K/UL (ref 3.5–11.3)

## 2023-04-27 PROCEDURE — 83036 HEMOGLOBIN GLYCOSYLATED A1C: CPT

## 2023-04-27 PROCEDURE — 87591 N.GONORRHOEAE DNA AMP PROB: CPT

## 2023-04-27 PROCEDURE — G8427 DOCREV CUR MEDS BY ELIG CLIN: HCPCS | Performed by: ADVANCED PRACTICE MIDWIFE

## 2023-04-27 PROCEDURE — 87491 CHLMYD TRACH DNA AMP PROBE: CPT

## 2023-04-27 PROCEDURE — G8417 CALC BMI ABV UP PARAM F/U: HCPCS | Performed by: ADVANCED PRACTICE MIDWIFE

## 2023-04-27 PROCEDURE — 87086 URINE CULTURE/COLONY COUNT: CPT

## 2023-04-27 PROCEDURE — 87389 HIV-1 AG W/HIV-1&-2 AB AG IA: CPT

## 2023-04-27 PROCEDURE — 86780 TREPONEMA PALLIDUM: CPT

## 2023-04-27 PROCEDURE — 86900 BLOOD TYPING SEROLOGIC ABO: CPT

## 2023-04-27 PROCEDURE — 86762 RUBELLA ANTIBODY: CPT

## 2023-04-27 PROCEDURE — 87340 HEPATITIS B SURFACE AG IA: CPT

## 2023-04-27 PROCEDURE — 36415 COLL VENOUS BLD VENIPUNCTURE: CPT | Performed by: ADVANCED PRACTICE MIDWIFE

## 2023-04-27 PROCEDURE — 86901 BLOOD TYPING SEROLOGIC RH(D): CPT

## 2023-04-27 PROCEDURE — 99211 OFF/OP EST MAY X REQ PHY/QHP: CPT | Performed by: ADVANCED PRACTICE MIDWIFE

## 2023-04-27 PROCEDURE — H1000 PRENATAL CARE ATRISK ASSESSM: HCPCS | Performed by: ADVANCED PRACTICE MIDWIFE

## 2023-04-27 PROCEDURE — 86850 RBC ANTIBODY SCREEN: CPT

## 2023-04-27 PROCEDURE — 85025 COMPLETE CBC W/AUTO DIFF WBC: CPT

## 2023-04-27 PROCEDURE — 86803 HEPATITIS C AB TEST: CPT

## 2023-04-27 RX ORDER — BUDESONIDE AND FORMOTEROL FUMARATE DIHYDRATE 160; 4.5 UG/1; UG/1
AEROSOL RESPIRATORY (INHALATION)
COMMUNITY
Start: 2023-03-24

## 2023-04-27 NOTE — PATIENT INSTRUCTIONS
Patient Education        Learning About Pregnancy  Your Care Instructions     Your health in the early weeks of your pregnancy is particularly important for your baby's health. Take good care of yourself. Anything you do that harms your body can also harm your baby. Make sure to go to all of your doctor appointments. Regular checkups will help keep you and your baby healthy. How can you care for yourself at home? Diet    Eat a balanced diet. Make sure your diet includes plenty of beans, peas, and leafy green vegetables. Do not skip meals or go for many hours without eating. If you are nauseated, try to eat a small, healthy snack every 2 to 3 hours. Do not eat fish that has a high level of mercury, such as shark, swordfish, or mackerel. Do not eat more than one can of tuna each week. Drink plenty of fluids. If you have kidney, heart, or liver disease and have to limit fluids, talk with your doctor before you increase the amount of fluids you drink. Cut down on caffeine, such as coffee, tea, and cola. Do not drink alcohol, such as beer, wine, or hard liquor. Take a multivitamin that contains at least 400 micrograms (mcg) of folic acid to help prevent birth defects. Fortified cereal and whole wheat bread are good additional sources of folic acid. Increase the calcium in your diet. Try to drink a quart of skim milk each day. You may also take calcium supplements and choose foods such as cheese and yogurt. Lifestyle    Make sure you go to your follow-up appointments. Get plenty of rest. You may be unusually tired while you are pregnant. Get at least 30 minutes of exercise on most days of the week. Walking is a good choice. If you have not exercised in the past, start out slowly. Take several short walks each day. Do not smoke. If you need help quitting, talk to your doctor about stop-smoking programs. These can increase your chances of quitting for good.      Do not touch cat

## 2023-04-27 NOTE — PROGRESS NOTES
New OB Visit    Date of service: 2023    Indu Taylor  Is a 20 y.o.  female presenting for a New OB visit with Nurse. Name of Father of Radha Logan is Pj Baig and is involved. Pt does not work. Pt is not Fertility pt. PT's PCP is: AHSAN Ash CNP     : 2000                                             Subjective:       Patient's last menstrual period was 2023. OB History    Para Term  AB Living   3 2 2     2   SAB IAB Ectopic Molar Multiple Live Births           0 2      # Outcome Date GA Lbr Dionisio/2nd Weight Sex Delivery Anes PTL Lv   3 Current            2 Term 20 39w0d 03:10 / 00:12 5 lb 6 oz (2.438 kg) F Vag-Spont EPI N GRACIELA   1 Term 19 39w2d 08:06 / 00:24 5 lb 5.3 oz (2.419 kg) F Vag-Spont EPI N GRACIELA             Social History     Tobacco Use   Smoking Status Every Day    Packs/day: 0.50    Types: Cigarettes   Smokeless Tobacco Never        Social History     Substance and Sexual Activity   Alcohol Use Not Currently        Allergies: Patient has no known allergies.       Current Outpatient Medications:     amoxicillin (AMOXIL) 500 MG capsule, Take 1 capsule by mouth 2 times daily for 7 days, Disp: 14 capsule, Rfl: 0    Prenatal Vit-Fe Fumarate-FA (PRENATAL 1+1 PO), Take by mouth, Disp: , Rfl:     SYMBICORT 160-4.5 MCG/ACT AERO, inhale one PUFF by MOUTH every FOUR hours as needed FOR cough/wheezing/shortness of breath, RINSE AND spit AFTER USE (STOP flovent), Disp: , Rfl:     albuterol sulfate HFA (VENTOLIN HFA) 108 (90 Base) MCG/ACT inhaler, Inhale 2 puffs into the lungs 4 times daily as needed for Wheezing (Patient not taking: Reported on 2023), Disp: 54 g, Rfl: 1    albuterol sulfate HFA (PROVENTIL;VENTOLIN;PROAIR) 108 (90 Base) MCG/ACT inhaler, Inhale 1 puff into the lungs every 4 hours as needed for Wheezing or Shortness of Breath (Patient not taking: Reported on 2023), Disp: 18 g, Rfl: 2    rizatriptan (MAXALT) 10 MG tablet, Take

## 2023-04-28 LAB
CHLAMYDIA DNA UR QL NAA+PROBE: NEGATIVE
EST. AVERAGE GLUCOSE BLD GHB EST-MCNC: 100 MG/DL
HBA1C MFR BLD: 5.1 % (ref 4–6)
MICROORGANISM SPEC CULT: NORMAL
N GONORRHOEA DNA UR QL NAA+PROBE: NEGATIVE
SPECIMEN DESCRIPTION: NORMAL
SPECIMEN DESCRIPTION: NORMAL

## 2023-04-28 SDOH — ECONOMIC STABILITY: INCOME INSECURITY: HOW HARD IS IT FOR YOU TO PAY FOR THE VERY BASICS LIKE FOOD, HOUSING, MEDICAL CARE, AND HEATING?: PATIENT DECLINED

## 2023-04-28 SDOH — ECONOMIC STABILITY: FOOD INSECURITY: WITHIN THE PAST 12 MONTHS, YOU WORRIED THAT YOUR FOOD WOULD RUN OUT BEFORE YOU GOT MONEY TO BUY MORE.: PATIENT DECLINED

## 2023-04-28 SDOH — ECONOMIC STABILITY: TRANSPORTATION INSECURITY
IN THE PAST 12 MONTHS, HAS LACK OF TRANSPORTATION KEPT YOU FROM MEETINGS, WORK, OR FROM GETTING THINGS NEEDED FOR DAILY LIVING?: PATIENT DECLINED

## 2023-04-28 SDOH — ECONOMIC STABILITY: FOOD INSECURITY: WITHIN THE PAST 12 MONTHS, THE FOOD YOU BOUGHT JUST DIDN'T LAST AND YOU DIDN'T HAVE MONEY TO GET MORE.: PATIENT DECLINED

## 2023-04-28 SDOH — ECONOMIC STABILITY: HOUSING INSECURITY
IN THE LAST 12 MONTHS, WAS THERE A TIME WHEN YOU DID NOT HAVE A STEADY PLACE TO SLEEP OR SLEPT IN A SHELTER (INCLUDING NOW)?: PATIENT REFUSED

## 2023-05-01 ENCOUNTER — ROUTINE PRENATAL (OUTPATIENT)
Dept: OBGYN | Age: 23
End: 2023-05-01
Payer: COMMERCIAL

## 2023-05-01 DIAGNOSIS — Z3A.10 10 WEEKS GESTATION OF PREGNANCY: Primary | ICD-10-CM

## 2023-05-01 PROCEDURE — 96372 THER/PROPH/DIAG INJ SC/IM: CPT | Performed by: ADVANCED PRACTICE MIDWIFE

## 2023-05-01 PROCEDURE — G8427 DOCREV CUR MEDS BY ELIG CLIN: HCPCS | Performed by: ADVANCED PRACTICE MIDWIFE

## 2023-05-01 PROCEDURE — G8417 CALC BMI ABV UP PARAM F/U: HCPCS | Performed by: ADVANCED PRACTICE MIDWIFE

## 2023-05-08 DIAGNOSIS — Z86.32 HISTORY OF GESTATIONAL DIABETES: Primary | ICD-10-CM

## 2023-05-11 NOTE — PROGRESS NOTES
Abdomen: enlarged, gravid, soft, nontender     Results reviewed today:  Sono:  20.3 WK IUP  CL: 3.8 cm  HR: 143 bpm  Posterior circumvellate placenta, vertexpresentation  Ovaries: WNL  Gender: female  Active fetal movements  All visualized fetal anatomy WNL   Lips/nose not visualized d/t position  No results found for this visit on 04/16/20. See prenatal vital sign section and fetal assessment section    ASSESSMENT & Plan    Diagnosis Orders   1. Encounter for supervision of other normal pregnancy in second trimester               I am having Shannon Laws maintain her acetaminophen and Prenatal MV-Min-Fe Fum-FA-DHA (PRENATAL 1 PO). Return in about 4 weeks (around 5/14/2020) for ob, ob sono for recheck face shots. There are no Patient Instructions on file for this visit.           Judith Keyes,4/16/2020 3:52 PM
no

## 2023-05-12 ENCOUNTER — HOSPITAL ENCOUNTER (OUTPATIENT)
Age: 23
Discharge: HOME | End: 2023-05-12
Payer: COMMERCIAL

## 2023-05-12 DIAGNOSIS — Z86.32 HISTORY OF GESTATIONAL DIABETES: ICD-10-CM

## 2023-05-12 LAB
GLUCOSE ADMINISTRATION: NORMAL
GLUCOSE TOLERANCE SCREEN 50G: 105 MG/DL (ref 70–135)

## 2023-05-12 PROCEDURE — 36415 COLL VENOUS BLD VENIPUNCTURE: CPT

## 2023-05-12 PROCEDURE — 82950 GLUCOSE TEST: CPT

## 2023-05-24 ENCOUNTER — HOSPITAL ENCOUNTER (OUTPATIENT)
Age: 23
Setting detail: SPECIMEN
Discharge: HOME | End: 2023-05-24
Payer: COMMERCIAL

## 2023-05-24 ENCOUNTER — ROUTINE PRENATAL (OUTPATIENT)
Dept: OBGYN | Age: 23
End: 2023-05-24

## 2023-05-24 VITALS — WEIGHT: 182.8 LBS | DIASTOLIC BLOOD PRESSURE: 70 MMHG | BODY MASS INDEX: 34.54 KG/M2 | SYSTOLIC BLOOD PRESSURE: 114 MMHG

## 2023-05-24 DIAGNOSIS — Z12.4 SCREENING FOR MALIGNANT NEOPLASM OF CERVIX: ICD-10-CM

## 2023-05-24 DIAGNOSIS — Z3A.13 13 WEEKS GESTATION OF PREGNANCY: ICD-10-CM

## 2023-05-24 DIAGNOSIS — Z34.81 ENCOUNTER FOR SUPERVISION OF OTHER NORMAL PREGNANCY IN FIRST TRIMESTER: Primary | ICD-10-CM

## 2023-05-24 PROCEDURE — G0145 SCR C/V CYTO,THINLAYER,RESCR: HCPCS

## 2023-05-24 NOTE — PROGRESS NOTES
Richa Brooks is here at 13w2d for:    Chief Complaint   Patient presents with    Routine Prenatal Visit     TBE, last pap 2020 negative. Cell free DNA drawn 2023, negative. Estimated Due Date: Estimated Date of Delivery: 23    OB History    Para Term  AB Living   3 2 2     2   SAB IAB Ectopic Molar Multiple Live Births           0 2      # Outcome Date GA Lbr Dionisio/2nd Weight Sex Delivery Anes PTL Lv   3 Current            2 Term 20 39w0d 03:10  00:12 5 lb 6 oz (2.438 kg) F Vag-Spont EPI N GRACIELA   1 Term 19 39w2d 08:06 / 00:24 5 lb 5.3 oz (2.419 kg) F Vag-Spont EPI N GRACIELA        Past Medical History:   Diagnosis Date    Asthma     pt has albuterol inhaler    Colitis     Colitis     Depression     , on meds    Dysmenorrhea in the adolescent     Facial abscess     was cyst left cheek    Rectal bleed     Vertigo        Past Surgical History:   Procedure Laterality Date    ABSCESS DRAINAGE Left     once left cheek    COLONOSCOPY      COLONOSCOPY  2013        UPPER GASTROINTESTINAL ENDOSCOPY  2013           Social History     Tobacco Use   Smoking Status Every Day    Packs/day: 0.50    Types: Cigarettes   Smokeless Tobacco Never        Social History     Substance and Sexual Activity   Alcohol Use Not Currently       No results found for this visit on 23. HPI: here for initial ob visit, feeling \"better'     PT denies fever, chills, nausea and vomiting       Vitals:  Estimated body mass index is 34.54 kg/m² as calculated from the following:    Height as of 23: 5' 1\" (1.549 m). Weight as of this encounter: 182 lb 12.8 oz (82.9 kg).   BP: 114/70  Weight - Scale: 182 lb 12.8 oz (82.9 kg)  Patient Position: Sitting  Albumin: Negative  Glucose: Negative  Fundal Height (cm): 13 cm  Fetal HR: 157  Movement: Absent           Abdomen: flat, soft, nontender  Total body exam completed please see prenatal

## 2023-05-30 LAB — CYTOLOGY REPORT: NORMAL

## 2023-06-02 LAB
HPV SAMPLE: NORMAL
HPV, GENOTYPE 16: NOT DETECTED
HPV, GENOTYPE 18: NOT DETECTED
HPV, HIGH RISK OTHER: NOT DETECTED
HPV, INTERPRETATION: NORMAL
SPECIMEN DESCRIPTION: NORMAL

## 2023-06-26 ENCOUNTER — ROUTINE PRENATAL (OUTPATIENT)
Dept: OBGYN | Age: 23
End: 2023-06-26

## 2023-06-26 VITALS — SYSTOLIC BLOOD PRESSURE: 110 MMHG | WEIGHT: 183 LBS | DIASTOLIC BLOOD PRESSURE: 62 MMHG | BODY MASS INDEX: 34.58 KG/M2

## 2023-06-26 DIAGNOSIS — Z3A.18 18 WEEKS GESTATION OF PREGNANCY: ICD-10-CM

## 2023-06-26 DIAGNOSIS — K05.10 GINGIVITIS: ICD-10-CM

## 2023-06-26 DIAGNOSIS — Z34.82 ENCOUNTER FOR SUPERVISION OF OTHER NORMAL PREGNANCY IN SECOND TRIMESTER: Primary | ICD-10-CM

## 2023-06-26 RX ORDER — AMOXICILLIN 500 MG/1
500 CAPSULE ORAL 3 TIMES DAILY
Qty: 21 CAPSULE | Refills: 0 | Status: SHIPPED | OUTPATIENT
Start: 2023-06-26 | End: 2023-07-03

## 2023-07-12 ENCOUNTER — ROUTINE PRENATAL (OUTPATIENT)
Dept: OBGYN | Age: 23
End: 2023-07-12

## 2023-07-12 VITALS — DIASTOLIC BLOOD PRESSURE: 80 MMHG | WEIGHT: 182.8 LBS | SYSTOLIC BLOOD PRESSURE: 116 MMHG | BODY MASS INDEX: 34.54 KG/M2

## 2023-07-12 DIAGNOSIS — Z3A.20 20 WEEKS GESTATION OF PREGNANCY: ICD-10-CM

## 2023-07-12 DIAGNOSIS — Z34.82 ENCOUNTER FOR SUPERVISION OF OTHER NORMAL PREGNANCY IN SECOND TRIMESTER: Primary | ICD-10-CM

## 2023-08-07 ENCOUNTER — ROUTINE PRENATAL (OUTPATIENT)
Dept: OBGYN | Age: 23
End: 2023-08-07
Payer: COMMERCIAL

## 2023-08-07 VITALS — SYSTOLIC BLOOD PRESSURE: 118 MMHG | DIASTOLIC BLOOD PRESSURE: 74 MMHG | BODY MASS INDEX: 34.2 KG/M2 | WEIGHT: 181 LBS

## 2023-08-07 DIAGNOSIS — Z3A.24 24 WEEKS GESTATION OF PREGNANCY: ICD-10-CM

## 2023-08-07 DIAGNOSIS — Z34.82 ENCOUNTER FOR SUPERVISION OF OTHER NORMAL PREGNANCY IN SECOND TRIMESTER: Primary | ICD-10-CM

## 2023-08-07 PROCEDURE — 99213 OFFICE O/P EST LOW 20 MIN: CPT | Performed by: ADVANCED PRACTICE MIDWIFE

## 2023-08-07 PROCEDURE — G8417 CALC BMI ABV UP PARAM F/U: HCPCS | Performed by: ADVANCED PRACTICE MIDWIFE

## 2023-08-07 PROCEDURE — 4004F PT TOBACCO SCREEN RCVD TLK: CPT | Performed by: ADVANCED PRACTICE MIDWIFE

## 2023-08-07 PROCEDURE — G8427 DOCREV CUR MEDS BY ELIG CLIN: HCPCS | Performed by: ADVANCED PRACTICE MIDWIFE

## 2023-09-05 ENCOUNTER — ROUTINE PRENATAL (OUTPATIENT)
Dept: OBGYN | Age: 23
End: 2023-09-05

## 2023-09-05 ENCOUNTER — HOSPITAL ENCOUNTER (OUTPATIENT)
Age: 23
Setting detail: SPECIMEN
Discharge: HOME | End: 2023-09-05
Payer: COMMERCIAL

## 2023-09-05 VITALS — WEIGHT: 182.3 LBS | DIASTOLIC BLOOD PRESSURE: 78 MMHG | BODY MASS INDEX: 34.45 KG/M2 | SYSTOLIC BLOOD PRESSURE: 116 MMHG

## 2023-09-05 DIAGNOSIS — Z67.91 RH NEGATIVE STATUS DURING PREGNANCY IN SECOND TRIMESTER: ICD-10-CM

## 2023-09-05 DIAGNOSIS — O26.892 RH NEGATIVE STATUS DURING PREGNANCY IN SECOND TRIMESTER: ICD-10-CM

## 2023-09-05 DIAGNOSIS — Z34.83 ENCOUNTER FOR SUPERVISION OF OTHER NORMAL PREGNANCY IN THIRD TRIMESTER: Primary | ICD-10-CM

## 2023-09-05 DIAGNOSIS — Z3A.28 28 WEEKS GESTATION OF PREGNANCY: ICD-10-CM

## 2023-09-05 DIAGNOSIS — J06.9 UPPER RESPIRATORY TRACT INFECTION, UNSPECIFIED TYPE: ICD-10-CM

## 2023-09-05 LAB
GLUCOSE 1H P 50 G GLC PO SERPL-MCNC: 117 MG/DL (ref 70–135)
GLUCOSE ADMINISTRATION: NORMAL
HGB BLD-MCNC: 11.3 G/DL (ref 11.9–15.1)

## 2023-09-05 PROCEDURE — 86901 BLOOD TYPING SEROLOGIC RH(D): CPT

## 2023-09-05 PROCEDURE — 86850 RBC ANTIBODY SCREEN: CPT

## 2023-09-05 PROCEDURE — 82950 GLUCOSE TEST: CPT

## 2023-09-05 PROCEDURE — 85018 HEMOGLOBIN: CPT

## 2023-09-05 RX ORDER — AZITHROMYCIN 250 MG/1
250 TABLET, FILM COATED ORAL SEE ADMIN INSTRUCTIONS
Qty: 6 TABLET | Refills: 0 | Status: SHIPPED | OUTPATIENT
Start: 2023-09-05 | End: 2023-09-10

## 2023-09-05 RX ORDER — MONTELUKAST SODIUM 10 MG/1
10 TABLET ORAL NIGHTLY
Qty: 30 TABLET | Refills: 0 | Status: SHIPPED | OUTPATIENT
Start: 2023-09-05

## 2023-09-07 ENCOUNTER — HOSPITAL ENCOUNTER (OUTPATIENT)
Dept: INFUSION THERAPY | Age: 23
Discharge: HOME | End: 2023-09-07
Payer: COMMERCIAL

## 2023-09-07 ENCOUNTER — TELEPHONE (OUTPATIENT)
Dept: OBGYN | Age: 23
End: 2023-09-07

## 2023-09-07 VITALS
RESPIRATION RATE: 18 BRPM | SYSTOLIC BLOOD PRESSURE: 117 MMHG | TEMPERATURE: 97.8 F | DIASTOLIC BLOOD PRESSURE: 63 MMHG | HEART RATE: 113 BPM

## 2023-09-07 PROCEDURE — 6360000002 HC RX W HCPCS: Performed by: ADVANCED PRACTICE MIDWIFE

## 2023-09-07 PROCEDURE — 96372 THER/PROPH/DIAG INJ SC/IM: CPT

## 2023-09-07 RX ADMIN — HUMAN RHO(D) IMMUNE GLOBULIN 300 MCG: 300 INJECTION, SOLUTION INTRAMUSCULAR at 13:11

## 2023-09-07 NOTE — TELEPHONE ENCOUNTER
Patient was seen earlier in the week and was given prescription for Z-adrienne and Singular. Patient states she vomits after every dose of Zithromax. Asking if you want to change to something else.

## 2023-09-07 NOTE — DISCHARGE INSTRUCTIONS
Outpatient Instructions for IM or Subcutaneous Injections    [address]  [address] [facility], [address] [facility]  [phone]      You are advised to carry out the following instructions:      Diet:  As prescribed by your physician. Activity:  As prescribed by your physician. Care of the injection site: If your injection site becomes red, sore, swollen, painful,has drainage, or you develop a fever notify your Physician. Other:    If you develop hives, rash, itching or have trouble breathing or any unusual symptoms, go to the nearest Emergency Room. These could be signs of an allergic reaction to the medication.         Follow up appointment:               ANY PROBLEMS OR CONCERNS FOLLOW UP WITH YOUR PHYSICIAN                                                            OR                 GO TO THE NEAREST EMERGENCY ROOM

## 2023-09-08 LAB
ABO + RH BLD: NORMAL
BLOOD GROUP ANTIBODIES SERPL: NEGATIVE
COMPONENT: NORMAL
COMPONENT: NORMAL
HISTORY CHECK: NORMAL
Lab: 1
STATUS OF UNITS: NORMAL
TRANSFUSION STATUS: NORMAL
UNIT DIVISION: 0
UNIT NUMBER: NORMAL

## 2023-09-08 RX ORDER — AMOXICILLIN 500 MG/1
500 CAPSULE ORAL 3 TIMES DAILY
Qty: 21 CAPSULE | Refills: 0 | Status: SHIPPED | OUTPATIENT
Start: 2023-09-08 | End: 2023-09-15

## 2023-09-18 ENCOUNTER — ROUTINE PRENATAL (OUTPATIENT)
Dept: OBGYN | Age: 23
End: 2023-09-18
Payer: COMMERCIAL

## 2023-09-18 VITALS — BODY MASS INDEX: 34.58 KG/M2 | WEIGHT: 183 LBS | DIASTOLIC BLOOD PRESSURE: 68 MMHG | SYSTOLIC BLOOD PRESSURE: 116 MMHG

## 2023-09-18 DIAGNOSIS — Z3A.30 30 WEEKS GESTATION OF PREGNANCY: ICD-10-CM

## 2023-09-18 DIAGNOSIS — Z34.83 ENCOUNTER FOR SUPERVISION OF OTHER NORMAL PREGNANCY IN THIRD TRIMESTER: Primary | ICD-10-CM

## 2023-09-18 PROCEDURE — 99213 OFFICE O/P EST LOW 20 MIN: CPT | Performed by: ADVANCED PRACTICE MIDWIFE

## 2023-09-18 PROCEDURE — G8427 DOCREV CUR MEDS BY ELIG CLIN: HCPCS | Performed by: ADVANCED PRACTICE MIDWIFE

## 2023-09-18 PROCEDURE — G8417 CALC BMI ABV UP PARAM F/U: HCPCS | Performed by: ADVANCED PRACTICE MIDWIFE

## 2023-09-18 PROCEDURE — 4004F PT TOBACCO SCREEN RCVD TLK: CPT | Performed by: ADVANCED PRACTICE MIDWIFE

## 2023-10-02 ENCOUNTER — ROUTINE PRENATAL (OUTPATIENT)
Dept: OBGYN | Age: 23
End: 2023-10-02
Payer: COMMERCIAL

## 2023-10-02 VITALS — SYSTOLIC BLOOD PRESSURE: 110 MMHG | BODY MASS INDEX: 34.2 KG/M2 | WEIGHT: 181 LBS | DIASTOLIC BLOOD PRESSURE: 72 MMHG

## 2023-10-02 DIAGNOSIS — Z34.83 ENCOUNTER FOR SUPERVISION OF OTHER NORMAL PREGNANCY IN THIRD TRIMESTER: Primary | ICD-10-CM

## 2023-10-02 DIAGNOSIS — Z3A.32 32 WEEKS GESTATION OF PREGNANCY: ICD-10-CM

## 2023-10-02 PROCEDURE — G8417 CALC BMI ABV UP PARAM F/U: HCPCS | Performed by: ADVANCED PRACTICE MIDWIFE

## 2023-10-02 PROCEDURE — G8484 FLU IMMUNIZE NO ADMIN: HCPCS | Performed by: ADVANCED PRACTICE MIDWIFE

## 2023-10-02 PROCEDURE — 99213 OFFICE O/P EST LOW 20 MIN: CPT | Performed by: ADVANCED PRACTICE MIDWIFE

## 2023-10-02 PROCEDURE — 4004F PT TOBACCO SCREEN RCVD TLK: CPT | Performed by: ADVANCED PRACTICE MIDWIFE

## 2023-10-02 PROCEDURE — G8427 DOCREV CUR MEDS BY ELIG CLIN: HCPCS | Performed by: ADVANCED PRACTICE MIDWIFE

## 2023-10-16 ENCOUNTER — ROUTINE PRENATAL (OUTPATIENT)
Dept: OBGYN | Age: 23
End: 2023-10-16
Payer: COMMERCIAL

## 2023-10-16 VITALS — DIASTOLIC BLOOD PRESSURE: 74 MMHG | WEIGHT: 185.5 LBS | BODY MASS INDEX: 35.05 KG/M2 | SYSTOLIC BLOOD PRESSURE: 114 MMHG

## 2023-10-16 DIAGNOSIS — Z3A.34 34 WEEKS GESTATION OF PREGNANCY: ICD-10-CM

## 2023-10-16 DIAGNOSIS — Z34.83 ENCOUNTER FOR SUPERVISION OF OTHER NORMAL PREGNANCY IN THIRD TRIMESTER: Primary | ICD-10-CM

## 2023-10-16 PROCEDURE — G8427 DOCREV CUR MEDS BY ELIG CLIN: HCPCS | Performed by: ADVANCED PRACTICE MIDWIFE

## 2023-10-16 PROCEDURE — G8417 CALC BMI ABV UP PARAM F/U: HCPCS | Performed by: ADVANCED PRACTICE MIDWIFE

## 2023-10-16 PROCEDURE — 99213 OFFICE O/P EST LOW 20 MIN: CPT | Performed by: ADVANCED PRACTICE MIDWIFE

## 2023-10-16 PROCEDURE — G8484 FLU IMMUNIZE NO ADMIN: HCPCS | Performed by: ADVANCED PRACTICE MIDWIFE

## 2023-10-16 PROCEDURE — 4004F PT TOBACCO SCREEN RCVD TLK: CPT | Performed by: ADVANCED PRACTICE MIDWIFE

## 2023-10-30 ENCOUNTER — HOSPITAL ENCOUNTER (OUTPATIENT)
Age: 23
Setting detail: SPECIMEN
Discharge: HOME | End: 2023-10-30
Payer: COMMERCIAL

## 2023-10-30 ENCOUNTER — ROUTINE PRENATAL (OUTPATIENT)
Dept: OBGYN | Age: 23
End: 2023-10-30
Payer: COMMERCIAL

## 2023-10-30 VITALS — DIASTOLIC BLOOD PRESSURE: 72 MMHG | WEIGHT: 183 LBS | SYSTOLIC BLOOD PRESSURE: 122 MMHG | BODY MASS INDEX: 34.58 KG/M2

## 2023-10-30 DIAGNOSIS — Z34.83 ENCOUNTER FOR SUPERVISION OF OTHER NORMAL PREGNANCY IN THIRD TRIMESTER: Primary | ICD-10-CM

## 2023-10-30 DIAGNOSIS — Z3A.36 36 WEEKS GESTATION OF PREGNANCY: ICD-10-CM

## 2023-10-30 PROCEDURE — G8484 FLU IMMUNIZE NO ADMIN: HCPCS | Performed by: ADVANCED PRACTICE MIDWIFE

## 2023-10-30 PROCEDURE — 87081 CULTURE SCREEN ONLY: CPT

## 2023-10-30 PROCEDURE — 4004F PT TOBACCO SCREEN RCVD TLK: CPT | Performed by: ADVANCED PRACTICE MIDWIFE

## 2023-10-30 PROCEDURE — G8417 CALC BMI ABV UP PARAM F/U: HCPCS | Performed by: ADVANCED PRACTICE MIDWIFE

## 2023-10-30 PROCEDURE — 99213 OFFICE O/P EST LOW 20 MIN: CPT | Performed by: ADVANCED PRACTICE MIDWIFE

## 2023-10-30 PROCEDURE — G8427 DOCREV CUR MEDS BY ELIG CLIN: HCPCS | Performed by: ADVANCED PRACTICE MIDWIFE

## 2023-10-30 NOTE — PROGRESS NOTES
trimester        2. 36 weeks gestation of pregnancy  Culture, Strep B Screen, Vaginal/Rectal                I am having Shannon Jaquez maintain her Flovent HFA, albuterol sulfate HFA, Prenatal Vit-Fe Fumarate-FA (PRENATAL 1+1 PO), Symbicort, and montelukast.    Return in about 1 week (around 11/6/2023) for ob. There are no Patient Instructions on file for this visit.              AHSAN Rincon CNM,10/30/2023 10:53 AM

## 2023-11-02 LAB
MICROORGANISM SPEC CULT: NORMAL
SPECIMEN DESCRIPTION: NORMAL

## 2023-11-06 ENCOUNTER — ROUTINE PRENATAL (OUTPATIENT)
Dept: OBGYN | Age: 23
End: 2023-11-06
Payer: COMMERCIAL

## 2023-11-06 VITALS — WEIGHT: 183.4 LBS | DIASTOLIC BLOOD PRESSURE: 68 MMHG | SYSTOLIC BLOOD PRESSURE: 118 MMHG | BODY MASS INDEX: 34.65 KG/M2

## 2023-11-06 DIAGNOSIS — Z34.83 ENCOUNTER FOR SUPERVISION OF OTHER NORMAL PREGNANCY IN THIRD TRIMESTER: Primary | ICD-10-CM

## 2023-11-06 DIAGNOSIS — Z3A.37 37 WEEKS GESTATION OF PREGNANCY: ICD-10-CM

## 2023-11-06 PROCEDURE — 99213 OFFICE O/P EST LOW 20 MIN: CPT | Performed by: ADVANCED PRACTICE MIDWIFE

## 2023-11-06 PROCEDURE — G8417 CALC BMI ABV UP PARAM F/U: HCPCS | Performed by: ADVANCED PRACTICE MIDWIFE

## 2023-11-06 PROCEDURE — G8427 DOCREV CUR MEDS BY ELIG CLIN: HCPCS | Performed by: ADVANCED PRACTICE MIDWIFE

## 2023-11-06 PROCEDURE — 4004F PT TOBACCO SCREEN RCVD TLK: CPT | Performed by: ADVANCED PRACTICE MIDWIFE

## 2023-11-06 PROCEDURE — G8484 FLU IMMUNIZE NO ADMIN: HCPCS | Performed by: ADVANCED PRACTICE MIDWIFE

## 2023-11-06 NOTE — PROGRESS NOTES
of other normal pregnancy in third trimester        2. 37 weeks gestation of pregnancy                  I am having Shannon Jaquez maintain her Flovent HFA, albuterol sulfate HFA, Prenatal Vit-Fe Fumarate-FA (PRENATAL 1+1 PO), Symbicort, and montelukast.    Return in about 1 week (around 11/13/2023) for ob sono for efw. There are no Patient Instructions on file for this visit.              AHSAN Chiu CNM,11/6/2023 10:28 AM

## 2023-11-13 ENCOUNTER — ROUTINE PRENATAL (OUTPATIENT)
Dept: OBGYN | Age: 23
End: 2023-11-13
Payer: COMMERCIAL

## 2023-11-13 VITALS — SYSTOLIC BLOOD PRESSURE: 118 MMHG | BODY MASS INDEX: 35.37 KG/M2 | DIASTOLIC BLOOD PRESSURE: 68 MMHG | WEIGHT: 187.2 LBS

## 2023-11-13 DIAGNOSIS — Z34.83 ENCOUNTER FOR SUPERVISION OF OTHER NORMAL PREGNANCY IN THIRD TRIMESTER: Primary | ICD-10-CM

## 2023-11-13 DIAGNOSIS — Z3A.38 38 WEEKS GESTATION OF PREGNANCY: ICD-10-CM

## 2023-11-13 PROCEDURE — G8484 FLU IMMUNIZE NO ADMIN: HCPCS | Performed by: ADVANCED PRACTICE MIDWIFE

## 2023-11-13 PROCEDURE — G8427 DOCREV CUR MEDS BY ELIG CLIN: HCPCS | Performed by: ADVANCED PRACTICE MIDWIFE

## 2023-11-13 PROCEDURE — 99213 OFFICE O/P EST LOW 20 MIN: CPT | Performed by: ADVANCED PRACTICE MIDWIFE

## 2023-11-13 PROCEDURE — G8417 CALC BMI ABV UP PARAM F/U: HCPCS | Performed by: ADVANCED PRACTICE MIDWIFE

## 2023-11-13 PROCEDURE — 4004F PT TOBACCO SCREEN RCVD TLK: CPT | Performed by: ADVANCED PRACTICE MIDWIFE

## 2023-11-20 ENCOUNTER — ROUTINE PRENATAL (OUTPATIENT)
Dept: OBGYN | Age: 23
End: 2023-11-20
Payer: COMMERCIAL

## 2023-11-20 ENCOUNTER — TELEPHONE (OUTPATIENT)
Dept: OBGYN | Age: 23
End: 2023-11-20

## 2023-11-20 VITALS — SYSTOLIC BLOOD PRESSURE: 118 MMHG | WEIGHT: 184 LBS | DIASTOLIC BLOOD PRESSURE: 84 MMHG | BODY MASS INDEX: 34.77 KG/M2

## 2023-11-20 DIAGNOSIS — Z34.83 ENCOUNTER FOR SUPERVISION OF OTHER NORMAL PREGNANCY IN THIRD TRIMESTER: Primary | ICD-10-CM

## 2023-11-20 DIAGNOSIS — Z3A.39 39 WEEKS GESTATION OF PREGNANCY: ICD-10-CM

## 2023-11-20 PROBLEM — O36.8190 DECREASED FETAL MOVEMENT AFFECTING MANAGEMENT OF MOTHER, ANTEPARTUM: Status: ACTIVE | Noted: 2023-11-20

## 2023-11-20 PROCEDURE — 99213 OFFICE O/P EST LOW 20 MIN: CPT | Performed by: ADVANCED PRACTICE MIDWIFE

## 2023-11-20 PROCEDURE — G8484 FLU IMMUNIZE NO ADMIN: HCPCS | Performed by: ADVANCED PRACTICE MIDWIFE

## 2023-11-20 PROCEDURE — G8417 CALC BMI ABV UP PARAM F/U: HCPCS | Performed by: ADVANCED PRACTICE MIDWIFE

## 2023-11-20 PROCEDURE — G8427 DOCREV CUR MEDS BY ELIG CLIN: HCPCS | Performed by: ADVANCED PRACTICE MIDWIFE

## 2023-11-20 PROCEDURE — 4004F PT TOBACCO SCREEN RCVD TLK: CPT | Performed by: ADVANCED PRACTICE MIDWIFE

## 2023-11-20 NOTE — TELEPHONE ENCOUNTER
Per Min BENSON, called pt to inform her to go to L&D for an NST for decreased fetal movement. Pt verbalized understanding.

## 2023-11-22 ENCOUNTER — ANESTHESIA (OUTPATIENT)
Dept: LABOR AND DELIVERY | Age: 23
DRG: 560 | End: 2023-11-22
Payer: COMMERCIAL

## 2023-11-22 ENCOUNTER — ANESTHESIA EVENT (OUTPATIENT)
Dept: LABOR AND DELIVERY | Age: 23
DRG: 560 | End: 2023-11-22
Payer: COMMERCIAL

## 2023-11-22 ENCOUNTER — HOSPITAL ENCOUNTER (INPATIENT)
Age: 23
LOS: 1 days | Discharge: HOME | DRG: 560 | End: 2023-11-23
Attending: ADVANCED PRACTICE MIDWIFE | Admitting: ADVANCED PRACTICE MIDWIFE
Payer: COMMERCIAL

## 2023-11-22 ENCOUNTER — APPOINTMENT (OUTPATIENT)
Dept: LABOR AND DELIVERY | Age: 23
DRG: 560 | End: 2023-11-22
Payer: COMMERCIAL

## 2023-11-22 PROBLEM — Z34.90 TERM PREGNANCY: Status: ACTIVE | Noted: 2023-11-22

## 2023-11-22 LAB
ABO + RH BLD: NORMAL
ARM BAND NUMBER: NORMAL
BASOPHILS # BLD: 0.06 K/UL (ref 0–0.2)
BASOPHILS NFR BLD: 0 % (ref 0–2)
BLOOD BANK SAMPLE EXPIRATION: NORMAL
BLOOD GROUP ANTIBODIES SERPL: NEGATIVE
EOSINOPHIL # BLD: 0.3 K/UL (ref 0–0.44)
EOSINOPHILS RELATIVE PERCENT: 2 % (ref 1–4)
ERYTHROCYTE [DISTWIDTH] IN BLOOD BY AUTOMATED COUNT: 13.8 % (ref 11.8–14.4)
HCT VFR BLD AUTO: 35.7 % (ref 36.3–47.1)
HGB BLD-MCNC: 11.9 G/DL (ref 11.9–15.1)
IMM GRANULOCYTES # BLD AUTO: 0.15 K/UL (ref 0–0.3)
IMM GRANULOCYTES NFR BLD: 1 %
LYMPHOCYTES NFR BLD: 4.28 K/UL (ref 1.1–3.7)
LYMPHOCYTES RELATIVE PERCENT: 25 % (ref 24–43)
MCH RBC QN AUTO: 29.8 PG (ref 25.2–33.5)
MCHC RBC AUTO-ENTMCNC: 33.3 G/DL (ref 28.4–34.8)
MCV RBC AUTO: 89.5 FL (ref 82.6–102.9)
MONOCYTES NFR BLD: 0.81 K/UL (ref 0.1–1.2)
MONOCYTES NFR BLD: 5 % (ref 3–12)
NEUTROPHILS NFR BLD: 67 % (ref 36–65)
NEUTS SEG NFR BLD: 11.41 K/UL (ref 1.5–8.1)
NRBC BLD-RTO: 0 PER 100 WBC
PLATELET # BLD AUTO: 367 K/UL (ref 138–453)
PMV BLD AUTO: 10.1 FL (ref 8.1–13.5)
RBC # BLD AUTO: 3.99 M/UL (ref 3.95–5.11)
WBC OTHER # BLD: 17 K/UL (ref 3.5–11.3)

## 2023-11-22 PROCEDURE — 86850 RBC ANTIBODY SCREEN: CPT

## 2023-11-22 PROCEDURE — 86900 BLOOD TYPING SEROLOGIC ABO: CPT

## 2023-11-22 PROCEDURE — 2580000003 HC RX 258: Performed by: ADVANCED PRACTICE MIDWIFE

## 2023-11-22 PROCEDURE — 36415 COLL VENOUS BLD VENIPUNCTURE: CPT

## 2023-11-22 PROCEDURE — 6360000002 HC RX W HCPCS: Performed by: ADVANCED PRACTICE MIDWIFE

## 2023-11-22 PROCEDURE — 85025 COMPLETE CBC W/AUTO DIFF WBC: CPT

## 2023-11-22 PROCEDURE — 3700000025 EPIDURAL BLOCK: Performed by: NURSE ANESTHETIST, CERTIFIED REGISTERED

## 2023-11-22 PROCEDURE — 51702 INSERT TEMP BLADDER CATH: CPT

## 2023-11-22 PROCEDURE — 10907ZC DRAINAGE OF AMNIOTIC FLUID, THERAPEUTIC FROM PRODUCTS OF CONCEPTION, VIA NATURAL OR ARTIFICIAL OPENING: ICD-10-PCS | Performed by: ADVANCED PRACTICE MIDWIFE

## 2023-11-22 PROCEDURE — 6360000002 HC RX W HCPCS: Performed by: NURSE ANESTHETIST, CERTIFIED REGISTERED

## 2023-11-22 PROCEDURE — 3E033VJ INTRODUCTION OF OTHER HORMONE INTO PERIPHERAL VEIN, PERCUTANEOUS APPROACH: ICD-10-PCS | Performed by: ADVANCED PRACTICE MIDWIFE

## 2023-11-22 PROCEDURE — 6370000000 HC RX 637 (ALT 250 FOR IP): Performed by: ADVANCED PRACTICE MIDWIFE

## 2023-11-22 PROCEDURE — 7200000001 HC VAGINAL DELIVERY

## 2023-11-22 PROCEDURE — 2500000003 HC RX 250 WO HCPCS: Performed by: NURSE ANESTHETIST, CERTIFIED REGISTERED

## 2023-11-22 PROCEDURE — 1220000000 HC SEMI PRIVATE OB R&B

## 2023-11-22 PROCEDURE — 59409 OBSTETRICAL CARE: CPT | Performed by: ADVANCED PRACTICE MIDWIFE

## 2023-11-22 PROCEDURE — 86901 BLOOD TYPING SEROLOGIC RH(D): CPT

## 2023-11-22 RX ORDER — SODIUM CHLORIDE 9 MG/ML
INJECTION, SOLUTION INTRAVENOUS PRN
Status: DISCONTINUED | OUTPATIENT
Start: 2023-11-22 | End: 2023-11-22

## 2023-11-22 RX ORDER — ROPIVACAINE HYDROCHLORIDE 2 MG/ML
INJECTION, SOLUTION EPIDURAL; INFILTRATION; PERINEURAL CONTINUOUS PRN
Status: DISCONTINUED | OUTPATIENT
Start: 2023-11-22 | End: 2023-11-22 | Stop reason: SDUPTHER

## 2023-11-22 RX ORDER — SODIUM CHLORIDE 0.9 % (FLUSH) 0.9 %
5-40 SYRINGE (ML) INJECTION EVERY 12 HOURS SCHEDULED
Status: DISCONTINUED | OUTPATIENT
Start: 2023-11-22 | End: 2023-11-22

## 2023-11-22 RX ORDER — SODIUM CHLORIDE, SODIUM LACTATE, POTASSIUM CHLORIDE, AND CALCIUM CHLORIDE .6; .31; .03; .02 G/100ML; G/100ML; G/100ML; G/100ML
500 INJECTION, SOLUTION INTRAVENOUS PRN
Status: DISCONTINUED | OUTPATIENT
Start: 2023-11-22 | End: 2023-11-22

## 2023-11-22 RX ORDER — DOCUSATE SODIUM 100 MG/1
100 CAPSULE, LIQUID FILLED ORAL 2 TIMES DAILY PRN
Status: DISCONTINUED | OUTPATIENT
Start: 2023-11-22 | End: 2023-11-23 | Stop reason: HOSPADM

## 2023-11-22 RX ORDER — MISOPROSTOL 100 UG/1
800 TABLET ORAL PRN
Status: DISCONTINUED | OUTPATIENT
Start: 2023-11-22 | End: 2023-11-23 | Stop reason: HOSPADM

## 2023-11-22 RX ORDER — SODIUM CHLORIDE 0.9 % (FLUSH) 0.9 %
5-40 SYRINGE (ML) INJECTION PRN
Status: DISCONTINUED | OUTPATIENT
Start: 2023-11-22 | End: 2023-11-22

## 2023-11-22 RX ORDER — MODIFIED LANOLIN
OINTMENT (GRAM) TOPICAL PRN
Status: DISCONTINUED | OUTPATIENT
Start: 2023-11-22 | End: 2023-11-23 | Stop reason: HOSPADM

## 2023-11-22 RX ORDER — NALBUPHINE HYDROCHLORIDE 10 MG/ML
10 INJECTION, SOLUTION INTRAMUSCULAR; INTRAVENOUS; SUBCUTANEOUS
Status: DISCONTINUED | OUTPATIENT
Start: 2023-11-22 | End: 2023-11-22

## 2023-11-22 RX ORDER — METHYLERGONOVINE MALEATE 0.2 MG/ML
200 INJECTION INTRAVENOUS PRN
Status: DISCONTINUED | OUTPATIENT
Start: 2023-11-22 | End: 2023-11-23 | Stop reason: HOSPADM

## 2023-11-22 RX ORDER — SODIUM CHLORIDE, SODIUM LACTATE, POTASSIUM CHLORIDE, CALCIUM CHLORIDE 600; 310; 30; 20 MG/100ML; MG/100ML; MG/100ML; MG/100ML
INJECTION, SOLUTION INTRAVENOUS CONTINUOUS
Status: DISCONTINUED | OUTPATIENT
Start: 2023-11-22 | End: 2023-11-22

## 2023-11-22 RX ORDER — SEVOFLURANE 250 ML/250ML
1 LIQUID RESPIRATORY (INHALATION) CONTINUOUS PRN
Status: DISCONTINUED | OUTPATIENT
Start: 2023-11-22 | End: 2023-11-22

## 2023-11-22 RX ORDER — ROPIVACAINE HYDROCHLORIDE 2 MG/ML
10 INJECTION, SOLUTION EPIDURAL; INFILTRATION; PERINEURAL CONTINUOUS
Status: DISCONTINUED | OUTPATIENT
Start: 2023-11-22 | End: 2023-11-22

## 2023-11-22 RX ORDER — MISOPROSTOL 100 UG/1
900 TABLET ORAL PRN
Status: DISCONTINUED | OUTPATIENT
Start: 2023-11-22 | End: 2023-11-23 | Stop reason: HOSPADM

## 2023-11-22 RX ORDER — LIDOCAINE HYDROCHLORIDE 20 MG/ML
INJECTION, SOLUTION EPIDURAL; INFILTRATION; INTRACAUDAL; PERINEURAL PRN
Status: DISCONTINUED | OUTPATIENT
Start: 2023-11-22 | End: 2023-11-22 | Stop reason: SDUPTHER

## 2023-11-22 RX ORDER — SODIUM CHLORIDE 0.9 % (FLUSH) 0.9 %
5-40 SYRINGE (ML) INJECTION PRN
Status: DISCONTINUED | OUTPATIENT
Start: 2023-11-22 | End: 2023-11-23 | Stop reason: HOSPADM

## 2023-11-22 RX ORDER — ONDANSETRON 2 MG/ML
4 INJECTION INTRAMUSCULAR; INTRAVENOUS EVERY 6 HOURS PRN
Status: DISCONTINUED | OUTPATIENT
Start: 2023-11-22 | End: 2023-11-22

## 2023-11-22 RX ORDER — CARBOPROST TROMETHAMINE 250 UG/ML
250 INJECTION, SOLUTION INTRAMUSCULAR PRN
Status: DISCONTINUED | OUTPATIENT
Start: 2023-11-22 | End: 2023-11-23 | Stop reason: HOSPADM

## 2023-11-22 RX ORDER — BUDESONIDE AND FORMOTEROL FUMARATE DIHYDRATE 160; 4.5 UG/1; UG/1
2 AEROSOL RESPIRATORY (INHALATION)
Status: DISCONTINUED | OUTPATIENT
Start: 2023-11-22 | End: 2023-11-23 | Stop reason: HOSPADM

## 2023-11-22 RX ORDER — SODIUM CHLORIDE 0.9 % (FLUSH) 0.9 %
5-40 SYRINGE (ML) INJECTION EVERY 12 HOURS SCHEDULED
Status: DISCONTINUED | OUTPATIENT
Start: 2023-11-22 | End: 2023-11-23 | Stop reason: HOSPADM

## 2023-11-22 RX ORDER — SODIUM CHLORIDE, SODIUM LACTATE, POTASSIUM CHLORIDE, AND CALCIUM CHLORIDE .6; .31; .03; .02 G/100ML; G/100ML; G/100ML; G/100ML
1500 INJECTION, SOLUTION INTRAVENOUS PRN
Status: DISCONTINUED | OUTPATIENT
Start: 2023-11-22 | End: 2023-11-22

## 2023-11-22 RX ORDER — SODIUM CHLORIDE, SODIUM LACTATE, POTASSIUM CHLORIDE, AND CALCIUM CHLORIDE .6; .31; .03; .02 G/100ML; G/100ML; G/100ML; G/100ML
1000 INJECTION, SOLUTION INTRAVENOUS PRN
Status: DISCONTINUED | OUTPATIENT
Start: 2023-11-22 | End: 2023-11-22

## 2023-11-22 RX ORDER — NALOXONE HYDROCHLORIDE 0.4 MG/ML
INJECTION, SOLUTION INTRAMUSCULAR; INTRAVENOUS; SUBCUTANEOUS PRN
Status: DISCONTINUED | OUTPATIENT
Start: 2023-11-22 | End: 2023-11-22

## 2023-11-22 RX ORDER — EPHEDRINE SULFATE/0.9% NACL/PF 25 MG/5 ML
5 SYRINGE (ML) INTRAVENOUS EVERY 5 MIN PRN
Status: DISCONTINUED | OUTPATIENT
Start: 2023-11-22 | End: 2023-11-22

## 2023-11-22 RX ORDER — MISOPROSTOL 100 UG/1
200 TABLET ORAL PRN
Status: DISCONTINUED | OUTPATIENT
Start: 2023-11-22 | End: 2023-11-23 | Stop reason: HOSPADM

## 2023-11-22 RX ORDER — ACETAMINOPHEN 325 MG/1
650 TABLET ORAL EVERY 4 HOURS PRN
Status: DISCONTINUED | OUTPATIENT
Start: 2023-11-22 | End: 2023-11-22

## 2023-11-22 RX ORDER — ACETAMINOPHEN 500 MG
1000 TABLET ORAL EVERY 8 HOURS PRN
Status: DISCONTINUED | OUTPATIENT
Start: 2023-11-22 | End: 2023-11-23 | Stop reason: HOSPADM

## 2023-11-22 RX ORDER — IBUPROFEN 800 MG/1
800 TABLET ORAL EVERY 8 HOURS PRN
Status: DISCONTINUED | OUTPATIENT
Start: 2023-11-22 | End: 2023-11-23 | Stop reason: HOSPADM

## 2023-11-22 RX ORDER — SODIUM CHLORIDE 9 MG/ML
INJECTION, SOLUTION INTRAVENOUS PRN
Status: DISCONTINUED | OUTPATIENT
Start: 2023-11-22 | End: 2023-11-23 | Stop reason: HOSPADM

## 2023-11-22 RX ADMIN — IBUPROFEN 800 MG: 800 TABLET, FILM COATED ORAL at 19:24

## 2023-11-22 RX ADMIN — LIDOCAINE HYDROCHLORIDE 2 ML: 20 INJECTION, SOLUTION EPIDURAL; INFILTRATION; INTRACAUDAL; PERINEURAL at 11:14

## 2023-11-22 RX ADMIN — METHYLERGONOVINE MALEATE 200 MCG: 0.2 INJECTION INTRAVENOUS at 17:24

## 2023-11-22 RX ADMIN — Medication 999 ML/HR: at 17:11

## 2023-11-22 RX ADMIN — SODIUM CHLORIDE, POTASSIUM CHLORIDE, SODIUM LACTATE AND CALCIUM CHLORIDE: 600; 310; 30; 20 INJECTION, SOLUTION INTRAVENOUS at 06:03

## 2023-11-22 RX ADMIN — ROPIVACAINE HYDROCHLORIDE 10 ML/HR: 2 INJECTION, SOLUTION EPIDURAL; INFILTRATION at 11:20

## 2023-11-22 RX ADMIN — ONDANSETRON 4 MG: 2 INJECTION INTRAMUSCULAR; INTRAVENOUS at 17:43

## 2023-11-22 RX ADMIN — LIDOCAINE HYDROCHLORIDE 1 ML: 20 INJECTION, SOLUTION EPIDURAL; INFILTRATION; INTRACAUDAL; PERINEURAL at 11:13

## 2023-11-22 RX ADMIN — ROPIVACAINE HYDROCHLORIDE 10 ML/HR: 2 INJECTION, SOLUTION EPIDURAL; INFILTRATION at 15:40

## 2023-11-22 RX ADMIN — Medication 1 MILLI-UNITS/MIN: at 06:29

## 2023-11-22 RX ADMIN — SODIUM CHLORIDE, SODIUM LACTATE, POTASSIUM CHLORIDE, AND CALCIUM CHLORIDE 1500 ML: .6; .31; .03; .02 INJECTION, SOLUTION INTRAVENOUS at 10:34

## 2023-11-22 RX ADMIN — SODIUM CHLORIDE, SODIUM LACTATE, POTASSIUM CHLORIDE, AND CALCIUM CHLORIDE 500 ML: .6; .31; .03; .02 INJECTION, SOLUTION INTRAVENOUS at 16:23

## 2023-11-22 RX ADMIN — LIDOCAINE HYDROCHLORIDE 2 ML: 20 INJECTION, SOLUTION EPIDURAL; INFILTRATION; INTRACAUDAL; PERINEURAL at 11:12

## 2023-11-22 ASSESSMENT — LIFESTYLE VARIABLES: SMOKING_STATUS: 1

## 2023-11-22 ASSESSMENT — PAIN DESCRIPTION - LOCATION: LOCATION: ABDOMEN

## 2023-11-22 ASSESSMENT — PAIN DESCRIPTION - ORIENTATION: ORIENTATION: LOWER

## 2023-11-22 ASSESSMENT — PAIN SCALES - GENERAL: PAINLEVEL_OUTOF10: 7

## 2023-11-22 ASSESSMENT — PAIN DESCRIPTION - DESCRIPTORS: DESCRIPTORS: CRAMPING

## 2023-11-22 NOTE — FLOWSHEET NOTE
Mariela crna at bedside for epidural  1105 sitting at bedside for epidural  1108 procedure started  1113 test dose  1114 to low folwers with right hip wedge  1120 epidural pump started

## 2023-11-22 NOTE — ANESTHESIA PROCEDURE NOTES
Epidural Block    Patient location during procedure: OB  Start time: 11/22/2023 11:06 AM  End time: 11/22/2023 11:22 AM  Reason for block: labor epidural  Staffing  Performed: resident/CRNA   Resident/CRNA: AHSAN Ibarra CRNA  Performed by: AHSAN Ibarra CRNA  Authorized by: AHSAN Ibarra CRNA    Epidural  Patient position: sitting  Prep: ChloraPrep and site prepped and draped  Patient monitoring: continuous pulse ox and frequent blood pressure checks  Approach: midline  Location: L3-4  Injection technique: BARBARA air  Provider prep: mask and sterile gloves  Needle  Needle type: Tuohy   Needle gauge: 17 G  Needle length: 3.5 in  Needle insertion depth: 6 cm  Catheter type: side hole  Catheter size: 19 G  Catheter at skin depth: 11 cm  Test dose: negative (5 ml licocaine 5.9%=UJH 3:532X @ 1111)  Kit: Kick Sport Sartorius perifix FX  Lot number: [id number]  Expiration date: 11/30/2024Catheter Secured: tegaderm and tape  Assessment  Sensory level: T8  Hemodynamics: stable  Attempts: 1  Outcomes: uncomplicated and patient tolerated procedure well  Additional Notes  Easy placement x 1 attempt  1114 to lowFowlers with CHASIDY  1122 PCEA instructions reviewed with patient and patient expresses understanding    Preanesthetic Checklist  Completed: patient identified, IV checked, site marked, risks and benefits discussed, surgical/procedural consents, equipment checked, pre-op evaluation, timeout performed, anesthesia consent given, oxygen available and monitors applied/VS acknowledged

## 2023-11-22 NOTE — L&D DELIVERY NOTE
Justino Candelaria [[id number]]      Labor Events     Labor: No   Steroids: None  Cervical Ripening Date/Time:      Antibiotics Received during Labor: No  Rupture Date/Time:  23 07:54:00   Rupture Type: AROM, Intact  Fluid Color: Clear  Fluid Odor: None  Fluid Volume:  Moderate  Induction: AROM, Oxytocin  Labor Complications: None              Anesthesia    Method: Epidural       Delivery Details      Delivery Date: 23 Delivery Time: 17:10:46   Delivery Type: Vaginal, Spontaneous              [place] Presentation    Presentation: Vertex  _: Occiput  _: Anterior       Shoulder Dystocia    Shoulder Dystocia Present?: No                                                                                                           Assisted Delivery Details    Forceps Attempted?: No  Vacuum Extractor Attempted?: No                                                             Cord    Vessels: 3 Vessels  Complications: Nuchal Loose  Cord Around: Head  Delayed Cord Clamping?: Yes  Cord Clamped Date/Time: 2023 17:15:38  Cord Blood Disposition: Lab  Gases Sent?: No   Cord Comments:  PROCEDURAL - OBSTETRIC - CORD OBSERVATION   cord blood and segment collected and sent to lab              Placenta    Date/Time: 2023 17:22:40  Removal: Manual Removal  Appearance: Intact  Disposition: Discarded       Lacerations    Episiotomy: None  Perineal Lacerations: None  Other Lacerations: no non-perineal laceration       Vaginal Counts    Initial Count Personnel: Nani Blackmon LPN  Intial Sponge Count: Correct Intial Needles Count: Correct Intial Instruments Count: Correct          Blood Loss  Mother: Kerry Reyes #[id number]     Start of Mother's Information      Delivery Blood Loss  23 0510 - 23 1736      None                 End of Mother's Information  Mother: Kerry Reyes #[id number]                Delivery Providers    Delivering clinician: Katia Gutierrez
no

## 2023-11-23 VITALS
DIASTOLIC BLOOD PRESSURE: 56 MMHG | RESPIRATION RATE: 16 BRPM | SYSTOLIC BLOOD PRESSURE: 110 MMHG | HEART RATE: 68 BPM | OXYGEN SATURATION: 98 % | TEMPERATURE: 97.5 F

## 2023-11-23 PROBLEM — O36.8190 DECREASED FETAL MOVEMENT AFFECTING MANAGEMENT OF MOTHER, ANTEPARTUM: Status: RESOLVED | Noted: 2023-11-20 | Resolved: 2023-11-23

## 2023-11-23 PROBLEM — Z34.90 TERM PREGNANCY: Status: RESOLVED | Noted: 2023-11-22 | Resolved: 2023-11-23

## 2023-11-23 PROBLEM — O99.332 SMOKING (TOBACCO) COMPLICATING PREGNANCY, SECOND TRIMESTER: Status: RESOLVED | Noted: 2018-10-24 | Resolved: 2023-11-23

## 2023-11-23 PROCEDURE — 99024 POSTOP FOLLOW-UP VISIT: CPT | Performed by: OBSTETRICS & GYNECOLOGY

## 2023-11-23 PROCEDURE — 6370000000 HC RX 637 (ALT 250 FOR IP): Performed by: ADVANCED PRACTICE MIDWIFE

## 2023-11-23 RX ORDER — IBUPROFEN 800 MG/1
800 TABLET ORAL EVERY 8 HOURS PRN
Qty: 60 TABLET | Refills: 3 | Status: SHIPPED | OUTPATIENT
Start: 2023-11-23

## 2023-11-23 RX ADMIN — IBUPROFEN 800 MG: 800 TABLET, FILM COATED ORAL at 02:49

## 2023-11-23 RX ADMIN — ACETAMINOPHEN 1000 MG: 500 TABLET ORAL at 08:29

## 2023-11-23 RX ADMIN — IBUPROFEN 800 MG: 800 TABLET, FILM COATED ORAL at 14:05

## 2023-11-23 RX ADMIN — DOCUSATE SODIUM 100 MG: 100 CAPSULE, LIQUID FILLED ORAL at 08:30

## 2023-11-23 ASSESSMENT — PAIN DESCRIPTION - DESCRIPTORS
DESCRIPTORS: CRAMPING
DESCRIPTORS: CRAMPING
DESCRIPTORS: ACHING

## 2023-11-23 ASSESSMENT — PAIN SCALES - GENERAL
PAINLEVEL_OUTOF10: 4
PAINLEVEL_OUTOF10: 5
PAINLEVEL_OUTOF10: 7

## 2023-11-23 ASSESSMENT — PAIN DESCRIPTION - ORIENTATION
ORIENTATION: LOWER;MID
ORIENTATION: LOWER;MID

## 2023-11-23 ASSESSMENT — PAIN DESCRIPTION - LOCATION
LOCATION: ABDOMEN
LOCATION: ABDOMEN
LOCATION: BACK

## 2023-11-23 ASSESSMENT — PAIN - FUNCTIONAL ASSESSMENT
PAIN_FUNCTIONAL_ASSESSMENT: ACTIVITIES ARE NOT PREVENTED
PAIN_FUNCTIONAL_ASSESSMENT: ACTIVITIES ARE NOT PREVENTED

## 2023-11-23 NOTE — DISCHARGE SUMMARY
Discharge Summary    Date: 11/23/2023  Patient Name: Nani Burgos    YOB: 2000     Age: 20 y. o. Admit Date: 11/22/2023  Discharge Date: 11/23/2023  Discharge Condition: Good    Admission Diagnosis  Term pregnancy [Z34.90]      Discharge Diagnosis  Principal Problem (Resolved):    Term pregnancy  Active Problems:    * No active hospital problems. [facility] AND [facility] Stay  Narrative of Hospital Course:  19 yo [id number] @ 44 2/7 weeks who was admitted for elective term induction. Patient was ruptured and started on Pitocin. She progressed well to complete and delivered a viable male infant. She did well in the postpartum period. She was ambulating, tolerating a regular diet, voiding and breastfeeding without difficulty. She was in stable condition at time of discharge. Consultants:  IP CONSULT TO LACTATION    Surgeries/procedures Performed:      Treatments:    Analgesia    Acetaminophen and Other    Discharge Plan/Disposition:  Home    Hospital/Incidental Findings Requiring Follow Up:    Patient Instructions:    Diet: Regular Diet    Activity:Activity as Tolerated, Ambulate in House, No Sex for and No Lifting, Driving or Strenuous Excercise  For number of days (if applicable): 6      Other Instructions:    Provider Follow-Up:   No follow-ups on file.      Significant Diagnostic Studies:    Recent Labs:  Admission on 11/22/2023  WBC                                           Date: 11/22/2023  Value: 17.0 (H)    Ref range: 3.5 - 11.3 k/uL    Status: Final  RBC                                           Date: 11/22/2023  Value: 3.99        Ref range: 3.95 - 5.11 m/uL   Status: Final  Hemoglobin                                    Date: 11/22/2023  Value: 11.9        Ref range: 11.9 - 15.1 g/dL   Status: Final  Hematocrit                                    Date: 11/22/2023  Value: 35.7 (L)    Ref range: 36.3 - 47.1 %      Status: Final  MCV                                           Date: as well as medication reconciliation, prescriptions for required medications, discharge plan, and follow up.     Electronically signed by Bipin Alcaraz MD on 11/23/23 at 12:09 PM EST

## 2023-11-23 NOTE — PROGRESS NOTES
Discharge instructions discussed with patient at this time, all issues and concerns addressed. Patient asked appropriate questions and answers provided. Patient denies any additional concerns at this time. Discharge paperwork signed. Patient given extra sage care items as requested. Informed to call when ready to leave department. Patient off unit in stable condition. Departure Mode: accompanied by staff.     Mobility at Departure: ambulatory    Discharged to: private residence    Time of Discharge: [phone]

## 2023-11-23 NOTE — DISCHARGE INSTRUCTIONS
Follow-up with your [facility] doctor as specified. [address] [facility] Department phone: [phone]    [address] [facility] [address]   Casandra [phone]   or Queenie Joya [phone]     DIET  Eat a well balanced diet focusing on foods high in fiber and protein. Drink plenty of fluids especially water. To avoid constipation you may take a mild stool softener as recommended by your doctor or midwife. ACTIVITY  Gradually increase your activity. Resume exercise regimen only after advice by your doctor or midwife. Avoid lifting anything heavier than a gallon of milk for SIX weeks. Avoid driving until your doctor or midwife has given their approval.  Sarahi Norman slowly from a lying to sitting and then a standing position. Climb stairs one at a time. Use caution when carrying your baby up and down the stairs. NO SEXUAL Activity for 6 weeks or until advised by your doctor; Nothing in vagina: intercourse, tampons, or douching. No swimming or tub baths x 6 weeks. Be prepared to discuss family planning at your follow-up OB visit. You may feel tired or have a lack of energy. You may continue your prenatal vitamin to replenish nutrients post delivery. Nap when baby naps to catch up on sleep. EMOTIONS  You may feel davison, sad, teary, & overwhelmed. Contact your OB provider if you feel you may be showing signs of postpartum depression, or have thoughts of harming yourself or your infant. If infant will not stop crying, contact another adult for help or place infant in their crib on their back and take a break. NEVER shake your infant. BLEEDING  Vaginal bleeding will decrease in amount over the next few weeks. You will notice that as your activity increases, your flow may increase. This is your body's way of telling you, you need to take things easier and rest more often.   Call your care provider if you are saturating more than

## 2023-11-23 NOTE — PLAN OF CARE
Problem: Pain  Goal: Verbalizes/displays adequate comfort level or baseline comfort level  2023 1401 by Brooke Cruz RN  Outcome: Completed     Problem: Vaginal Birth or  Section  Goal: Fetal and maternal status remain reassuring during the birth process  Description:  Birth OB-Pregnancy care plan goal which identifies if the fetal and maternal status remain reassuring during the birth process  2023 1401 by Brooke Cruz RN  Outcome: Completed     Problem: Postpartum  Goal: Experiences normal postpartum course  Description:  Postpartum OB-Pregnancy care plan goal which identifies if the mother is experiencing a normal postpartum course  2023 1401 by Brooke Cruz RN  Outcome: Completed     Problem: Postpartum  Goal: Appropriate maternal -  bonding  Description:  Postpartum OB-Pregnancy care plan goal which identifies if the mother and  are bonding appropriately  2023 1401 by Brooke Cruz RN  Outcome: Completed     Problem: Postpartum  Goal: Establishment of infant feeding pattern  Description:  Postpartum OB-Pregnancy care plan goal which identifies if the mother is establishing a feeding pattern with their   2023 1401 by Brooke Cruz RN  Outcome: Completed     Problem: Postpartum  Goal: Incisions, wounds, or drain sites healing without S/S of infection  2023 1401 by Brooke Cruz RN  Outcome: Completed     Problem: Infection - Adult  Goal: Absence of infection at discharge  2023 1401 by Brooke Cruz RN  Outcome: Completed     Problem: Infection - Adult  Goal: Absence of infection during hospitalization  2023 1401 by Brooke Cruz RN  Outcome: Completed     Problem: Infection - Adult  Goal: Absence of fever/infection during anticipated neutropenic period  2023 1401 by Brooke Cruz RN  Outcome: Completed     Problem: Safety - Adult  Goal: Free from fall injury  2023 1401 by

## 2023-11-23 NOTE — ANESTHESIA POSTPROCEDURE EVALUATION
Department of Anesthesiology  Postprocedure Note    Patient: Isaura Jaquez  MRN: [id number]  YOB: 2000  Date of evaluation: 11/23/2023      Procedure Summary     Date: 11/22/23 Room / Location:     Anesthesia Start: 1106 Anesthesia Stop: 8858    Procedure: Labor Analgesia Diagnosis:     Scheduled Providers:  Responsible Provider: AHSAN Urias CRNA    Anesthesia Type: epidural ASA Status: 2          Anesthesia Type: No value filed. Jonelle Phase I:      Jonelle Phase II:        Anesthesia Post Evaluation    Patient location during evaluation: bedside  Patient participation: complete - patient participated  Level of consciousness: awake and alert  Pain score: 2  Airway patency: patent  Nausea & Vomiting: no nausea and no vomiting  Complications: no  Cardiovascular status: hemodynamically stable  Respiratory status: acceptable  Hydration status: stable  Comments: Patient reports being sore at epidural site. I told the patient that being sore at the epidural site is normal and similar to a bruise the soreness should subside in a few days.     Pain management: adequate

## 2023-12-14 ENCOUNTER — TELEPHONE (OUTPATIENT)
Dept: OBGYN | Age: 23
End: 2023-12-14

## 2023-12-14 NOTE — TELEPHONE ENCOUNTER
Post Partum Phone Call Vaginal delivery  Follow Up 2 weeks ronni after discharge      Date of service: 12/14/2023    Isaura Jaquez  Is a 20 y.o. Delivery date 11/22/23    Type of delivery:  Spontaneous vaginal delivery    Laceration:No,     Infant gender: male    Infant name: Ace    Are you breast or bottle feeding? Bottle     How did first pediatrician visit go: great     How are you feeling: \"been fine\"     How is your bleeding: still bleeding, but very light like spotting     Any questions or concerns: no     Are you on any medications for depression. no    Information given to pt. N/a patient is doing well     EPPDS:       10/5/2020     1:38 PM   Postpartum Depression Scale   I have been able to laugh and see the funny side of things Not quite so much now   I have looked forward with enjoyment to things As much as I ever did   I have blamed myself unnecessarily when things went wrong Yes, some of the time   I have been anxious or worried for no good reason Yes, sometimes   I have felt scared or panicky for no good reason Yes, sometimes   I haven't been able to cope lately No, most of the time I have coped quite well   I have been so unhappy that I have had difficulty sleeping Yes, most of the time   I have felt sad or miserable Yes, quite often   I have been so unhappy that I have been crying Yes, quite often   The thought of harming myself has occurred to me Never   Total 15       If above 10 schedule pt and appointment. Within a few days    Lets schedule your appt now -   Date - 1/3/24  What for - 6 wk ppv    History of thyroid issues? No  If yes order TSH with reflux to have done in lab within a week

## 2024-01-08 ENCOUNTER — HOSPITAL ENCOUNTER (OUTPATIENT)
Age: 24
Setting detail: SPECIMEN
Discharge: HOME | End: 2024-01-08
Payer: COMMERCIAL

## 2024-01-08 ENCOUNTER — POSTPARTUM VISIT (OUTPATIENT)
Dept: OBGYN | Age: 24
End: 2024-01-08
Payer: COMMERCIAL

## 2024-01-08 VITALS
WEIGHT: 175 LBS | SYSTOLIC BLOOD PRESSURE: 118 MMHG | BODY MASS INDEX: 33.04 KG/M2 | HEIGHT: 61 IN | DIASTOLIC BLOOD PRESSURE: 74 MMHG

## 2024-01-08 DIAGNOSIS — N92.6 IRREGULAR MENSES: ICD-10-CM

## 2024-01-08 DIAGNOSIS — N39.46 MIXED INCONTINENCE: ICD-10-CM

## 2024-01-08 DIAGNOSIS — R32 URINARY INCONTINENCE, UNSPECIFIED TYPE: ICD-10-CM

## 2024-01-08 DIAGNOSIS — J01.00 SUBACUTE MAXILLARY SINUSITIS: ICD-10-CM

## 2024-01-08 LAB
BILIRUBIN, POC: NEGATIVE
BLOOD URINE, POC: ABNORMAL
CLARITY, POC: ABNORMAL
COLOR, POC: YELLOW
CONTROL: PRESENT
GLUCOSE URINE, POC: NEGATIVE
HGB, POC: 12.6
KETONES, POC: NEGATIVE
LEUKOCYTE EST, POC: ABNORMAL
NITRITE, POC: NEGATIVE
PH, POC: 7
PREGNANCY TEST URINE, POC: NEGATIVE
PROTEIN, POC: NEGATIVE
SPECIFIC GRAVITY, POC: 1.02
UROBILINOGEN, POC: 0.2

## 2024-01-08 PROCEDURE — 96372 THER/PROPH/DIAG INJ SC/IM: CPT | Performed by: ADVANCED PRACTICE MIDWIFE

## 2024-01-08 PROCEDURE — 87086 URINE CULTURE/COLONY COUNT: CPT

## 2024-01-08 PROCEDURE — 81002 URINALYSIS NONAUTO W/O SCOPE: CPT | Performed by: ADVANCED PRACTICE MIDWIFE

## 2024-01-08 RX ORDER — OXYBUTYNIN CHLORIDE 10 MG/1
10 TABLET, EXTENDED RELEASE ORAL DAILY
Qty: 30 TABLET | Refills: 3 | Status: SHIPPED | OUTPATIENT
Start: 2024-01-08

## 2024-01-08 RX ORDER — MEDROXYPROGESTERONE ACETATE 150 MG/ML
150 INJECTION, SUSPENSION INTRAMUSCULAR ONCE
Status: COMPLETED | OUTPATIENT
Start: 2024-01-08 | End: 2024-01-08

## 2024-01-08 RX ORDER — AZITHROMYCIN 250 MG/1
250 TABLET, FILM COATED ORAL SEE ADMIN INSTRUCTIONS
Qty: 6 TABLET | Refills: 0 | Status: SHIPPED | OUTPATIENT
Start: 2024-01-08 | End: 2024-01-13

## 2024-01-08 RX ADMIN — MEDROXYPROGESTERONE ACETATE 150 MG: 150 INJECTION, SUSPENSION INTRAMUSCULAR at 10:45

## 2024-01-08 NOTE — PROGRESS NOTES
Follow up 6 weeks after delivery.  Desires another mirena for cycle control.c/o sinusitis and dry cough, \"can't shake\".      Objective   No acute distress  Excellent communications  Well-nourished   Assessment and Plan          Diagnosis Orders   1. Postpartum care and examination  POCT hemoglobin      2. Urinary incontinence, unspecified type  POCT Urinalysis no Micro      3. Mixed incontinence  oxyBUTYnin (DITROPAN XL) 10 MG extended release tablet      4. Subacute maxillary sinusitis  azithromycin (ZITHROMAX) 250 MG tablet      5. Irregular menses                  I have discontinued Shannon Jaquez's Flovent HFA, albuterol sulfate HFA, Prenatal Vit-Fe Fumarate-FA (PRENATAL 1+1 PO), Symbicort, and montelukast. I am also having her start on oxyBUTYnin and azithromycin. Additionally, I am having her maintain her ibuprofen.    Return in about 6 weeks (around 2/19/2024) for mirena insertion.    There are no Patient Instructions on file for this visit.    Time spent 30 minutes      AHSAN Yepez CNM,1/8/2024 10:32 AM

## 2024-01-09 LAB
MICROORGANISM SPEC CULT: NORMAL
SPECIMEN DESCRIPTION: NORMAL

## 2024-02-02 ENCOUNTER — TELEPHONE (OUTPATIENT)
Dept: OBGYN | Age: 24
End: 2024-02-02

## 2024-02-02 NOTE — TELEPHONE ENCOUNTER
Pt called in stated she had a depo injection last month and she passed a clot larger then a quarter and it looked \"fleshy\" and she was concerned.  She said she's unsure if this is the first actual period since having her baby because she's been bleeding since giving birth, she only went about one week of not bleeding.     Should she be concerned?

## 2024-02-21 ENCOUNTER — PROCEDURE VISIT (OUTPATIENT)
Dept: OBGYN | Age: 24
End: 2024-02-21
Payer: COMMERCIAL

## 2024-02-21 ENCOUNTER — HOSPITAL ENCOUNTER (OUTPATIENT)
Age: 24
Setting detail: SPECIMEN
Discharge: HOME | End: 2024-02-21
Payer: COMMERCIAL

## 2024-02-21 VITALS
SYSTOLIC BLOOD PRESSURE: 118 MMHG | WEIGHT: 171 LBS | HEIGHT: 61 IN | BODY MASS INDEX: 32.28 KG/M2 | DIASTOLIC BLOOD PRESSURE: 68 MMHG

## 2024-02-21 DIAGNOSIS — N92.6 IRREGULAR MENSES: ICD-10-CM

## 2024-02-21 DIAGNOSIS — Z01.812 PRE-PROCEDURE LAB EXAM: Primary | ICD-10-CM

## 2024-02-21 LAB
CONTROL: PRESENT
PREGNANCY TEST URINE, POC: NEGATIVE

## 2024-02-21 PROCEDURE — 81025 URINE PREGNANCY TEST: CPT | Performed by: ADVANCED PRACTICE MIDWIFE

## 2024-02-21 PROCEDURE — 87591 N.GONORRHOEAE DNA AMP PROB: CPT

## 2024-02-21 PROCEDURE — 58300 INSERT INTRAUTERINE DEVICE: CPT | Performed by: ADVANCED PRACTICE MIDWIFE

## 2024-02-21 PROCEDURE — 87491 CHLMYD TRACH DNA AMP PROBE: CPT

## 2024-02-21 RX ORDER — BUDESONIDE AND FORMOTEROL FUMARATE DIHYDRATE 160; 4.5 UG/1; UG/1
AEROSOL RESPIRATORY (INHALATION)
COMMUNITY
Start: 2024-02-12

## 2024-02-21 RX ORDER — MEDROXYPROGESTERONE ACETATE 150 MG/ML
150 INJECTION, SUSPENSION INTRAMUSCULAR
COMMUNITY

## 2024-02-21 RX ORDER — ALBUTEROL SULFATE 90 UG/1
AEROSOL, METERED RESPIRATORY (INHALATION)
COMMUNITY
Start: 2024-02-12

## 2024-02-21 ASSESSMENT — PATIENT HEALTH QUESTIONNAIRE - PHQ9
SUM OF ALL RESPONSES TO PHQ QUESTIONS 1-9: 0
SUM OF ALL RESPONSES TO PHQ9 QUESTIONS 1 & 2: 0
SUM OF ALL RESPONSES TO PHQ QUESTIONS 1-9: 0
SUM OF ALL RESPONSES TO PHQ QUESTIONS 1-9: 0
1. LITTLE INTEREST OR PLEASURE IN DOING THINGS: 0
SUM OF ALL RESPONSES TO PHQ QUESTIONS 1-9: 0
2. FEELING DOWN, DEPRESSED OR HOPELESS: 0

## 2024-02-21 NOTE — PROGRESS NOTES
The patient is a 24 y.o. female that presents for IUD insertion for  irregular menses    OB History          3    Para   3    Term   3            AB        Living   3         SAB        IAB        Ectopic        Molar        Multiple   0    Live Births   3                Allergies: Patient has no known allergies.    Vitals: Blood pressure 118/68, height 1.549 m (5' 1\"), weight 77.6 kg (171 lb), not currently breastfeeding.    Last coitus: 1  week(s)    Premedicated with Motrin 800 mg: No    Cytotec 200mcg:No    UCG: negative    Consent signed:  Yes    PROCEDURE:    Mirena      Bimanual exam: anteverted    Cervix cleansed with: Betadinex3    Tenaculum applied: Yes     Uterus sounded: 8cm    Mirena inserted without difficulty. IUD strings trimmed to 2 cm.     Assessment:   Diagnosis Orders   1. Pre-procedure lab exam  POCT urine pregnancy      2. Irregular menses  levonorgestrel (MIRENA) IUD 52 mg 1 each    IA INSERTION INTRAUTERINE DEVICE IUD    C.trachomatis N.gonorrhoeae DNA            Plan:  Education on IUD    Motrin 800 mg Q 8 hours PRN  RTO one month for ultrasound for string check.  See MAR for lot # and NDC #    Return in about 1 month (around 3/21/2024) for string check.    Elva Keyes, AHSAN - MARCELINO,2024 12:04 PM

## 2024-02-22 LAB
C TRACH DNA SPEC QL PROBE+SIG AMP: NEGATIVE
N GONORRHOEA DNA SPEC QL PROBE+SIG AMP: NEGATIVE
SPECIMEN DESCRIPTION: NORMAL

## 2024-04-10 ENCOUNTER — OFFICE VISIT (OUTPATIENT)
Dept: OBGYN | Age: 24
End: 2024-04-10
Payer: COMMERCIAL

## 2024-04-10 VITALS
SYSTOLIC BLOOD PRESSURE: 116 MMHG | DIASTOLIC BLOOD PRESSURE: 70 MMHG | HEIGHT: 61 IN | BODY MASS INDEX: 32.36 KG/M2 | WEIGHT: 171.4 LBS

## 2024-04-10 DIAGNOSIS — Z97.5 PRESENCE OF IUD: Primary | ICD-10-CM

## 2024-04-10 PROCEDURE — 99212 OFFICE O/P EST SF 10 MIN: CPT | Performed by: ADVANCED PRACTICE MIDWIFE

## 2024-04-10 PROCEDURE — G8427 DOCREV CUR MEDS BY ELIG CLIN: HCPCS | Performed by: ADVANCED PRACTICE MIDWIFE

## 2024-04-10 PROCEDURE — G8417 CALC BMI ABV UP PARAM F/U: HCPCS | Performed by: ADVANCED PRACTICE MIDWIFE

## 2024-04-10 PROCEDURE — 4004F PT TOBACCO SCREEN RCVD TLK: CPT | Performed by: ADVANCED PRACTICE MIDWIFE

## 2024-04-10 NOTE — PROGRESS NOTES
PROBLEM VISIT     Date of service: 4/10/2024    Shannon Jaquez  Is a 24 y.o. , female    PT's PCP is: Charisma Barkley, AHSAN - GIANCARLO     : 2000                                             Subjective:       No LMP recorded. (Menstrual status: IUD).   OB History    Para Term  AB Living   3 3 3     3   SAB IAB Ectopic Molar Multiple Live Births           0 3      # Outcome Date GA Lbr Dionisio/2nd Weight Sex Delivery Anes PTL Lv   3 Term 23 39w2d / 00:25 2.739 kg (6 lb 0.6 oz) M Vag-Spont EPI N GRACIELA   2 Term 20 39w0d 03:10  00:12 2.438 kg (5 lb 6 oz) F Vag-Spont EPI N GRACIELA   1 Term 19 39w2d 08:06 / 00:24 2.419 kg (5 lb 5.3 oz) F Vag-Spont EPI N GRACIELA        Social History     Tobacco Use   Smoking Status Every Day    Current packs/day: 0.50    Types: Cigarettes   Smokeless Tobacco Never        Social History     Substance and Sexual Activity   Alcohol Use Not Currently       Allergies: Patient has no known allergies.      Current Outpatient Medications:     Levonorgestrel (MIRENA, 52 MG, IU), by IntraUTERine route, Disp: , Rfl:     albuterol sulfate HFA (PROVENTIL;VENTOLIN;PROAIR) 108 (90 Base) MCG/ACT inhaler, , Disp: , Rfl:     SYMBICORT 160-4.5 MCG/ACT AERO, , Disp: , Rfl:     medroxyPROGESTERone (DEPO-PROVERA) 150 MG/ML injection, Inject 1 mL into the muscle every 3 months (Patient not taking: Reported on 4/10/2024), Disp: , Rfl:     Social History     Substance and Sexual Activity   Sexual Activity Yes    Partners: Male    Birth control/protection: I.U.D.       Last Yearly date:  2023  Last pap date and results: 2023 ASCUS    Last HPV date and results: 2023 negative    Chief Complaint   Patient presents with    Menstrual Problem     String check, patient had Mirena IUD placed 2024. C/O decreased libido.         PE:  Vital Signs  Blood pressure 116/70, height 1.549 m (5' 1\"), weight 77.7 kg (171 lb 6.4 oz), not currently breastfeeding.  Estimated

## 2024-10-10 ENCOUNTER — TELEPHONE (OUTPATIENT)
Dept: OBGYN | Age: 24
End: 2024-10-10

## 2024-10-10 RX ORDER — VENLAFAXINE HYDROCHLORIDE 37.5 MG/1
CAPSULE, EXTENDED RELEASE ORAL
Qty: 30 CAPSULE | Refills: 0 | Status: SHIPPED | OUTPATIENT
Start: 2024-10-10

## 2024-11-19 ENCOUNTER — HOSPITAL ENCOUNTER (OUTPATIENT)
Age: 24
Setting detail: SPECIMEN
Discharge: HOME | End: 2024-11-19
Payer: COMMERCIAL

## 2024-11-19 ENCOUNTER — OFFICE VISIT (OUTPATIENT)
Dept: OBGYN | Age: 24
End: 2024-11-19
Payer: COMMERCIAL

## 2024-11-19 VITALS
WEIGHT: 171 LBS | BODY MASS INDEX: 32.28 KG/M2 | HEIGHT: 61 IN | SYSTOLIC BLOOD PRESSURE: 110 MMHG | DIASTOLIC BLOOD PRESSURE: 66 MMHG

## 2024-11-19 DIAGNOSIS — Z01.419 ENCOUNTER FOR ANNUAL ROUTINE GYNECOLOGICAL EXAMINATION: Primary | ICD-10-CM

## 2024-11-19 DIAGNOSIS — Z12.4 SCREENING FOR MALIGNANT NEOPLASM OF CERVIX: ICD-10-CM

## 2024-11-19 DIAGNOSIS — Z80.3 FAMILY HISTORY OF MALIGNANT NEOPLASM OF BREAST IN FIRST DEGREE RELATIVE DIAGNOSED WHEN YOUNGER THAN 50 YEARS OF AGE: ICD-10-CM

## 2024-11-19 DIAGNOSIS — N93.0 POSTCOITAL BLEEDING: ICD-10-CM

## 2024-11-19 DIAGNOSIS — N92.6 IRREGULAR MENSES: ICD-10-CM

## 2024-11-19 PROCEDURE — 87491 CHLMYD TRACH DNA AMP PROBE: CPT

## 2024-11-19 PROCEDURE — 87591 N.GONORRHOEAE DNA AMP PROB: CPT

## 2024-11-19 PROCEDURE — G8484 FLU IMMUNIZE NO ADMIN: HCPCS | Performed by: ADVANCED PRACTICE MIDWIFE

## 2024-11-19 PROCEDURE — G0145 SCR C/V CYTO,THINLAYER,RESCR: HCPCS

## 2024-11-19 PROCEDURE — 99395 PREV VISIT EST AGE 18-39: CPT | Performed by: ADVANCED PRACTICE MIDWIFE

## 2024-11-19 NOTE — PROGRESS NOTES
Mother     Cancer Mother         breast    Allergies Mother     Crohn's Disease Mother     Cancer Father         HPV, throat    High Cholesterol Father     Migraines Father     Stroke Father     Heart Disease Father     Diabetes Father     No Known Problems Sister     No Known Problems Sister     No Known Problems Sister     No Known Problems Sister     No Known Problems Sister     Migraines Brother     No Known Problems Maternal Grandmother     No Known Problems Maternal Grandfather     Heart Disease Paternal Grandmother     Diabetes Paternal Grandfather     Heart Disease Paternal Grandfather     Stroke Paternal Grandfather     Scoliosis Paternal Grandfather        Chief Complaint   Patient presents with    Annual Exam     Pt here today for yearly exam. Pt states she is still having periods on the mirena and bleeding starts after intercourse. Pt wants to discuss other options. Last pap was 5/24/23 ASCUS HPV (-).           PE:  Vital Signs  Blood pressure 110/66, height 1.549 m (5' 1\"), weight 77.6 kg (171 lb), last menstrual period 11/04/2024, not currently breastfeeding.  Estimated body mass index is 32.31 kg/m² as calculated from the following:    Height as of this encounter: 1.549 m (5' 1\").    Weight as of this encounter: 77.6 kg (171 lb).    Labs:    No results found for this visit on 11/19/24.    No data recorded    NURSE: SHAQ     HPI: patient here for annual gyn exam, but desires better cycle control as she is still having monthly bleeding and post coital bleeding on mirena IUD    Review of Systems   Constitutional: Negative.    HENT:  Negative for congestion.    Respiratory:  Negative for shortness of breath.    Cardiovascular:  Negative for chest pain.   Gastrointestinal:  Negative for abdominal pain.   Genitourinary:  Negative for dysuria.   Musculoskeletal:  Negative for neck pain.   Skin:  Negative for rash.   Neurological:  Negative for headaches.   Psychiatric/Behavioral:  The patient is not

## 2024-11-20 ASSESSMENT — ENCOUNTER SYMPTOMS
SHORTNESS OF BREATH: 0
ABDOMINAL PAIN: 0

## 2024-11-26 LAB — CYTOLOGY REPORT: NORMAL

## 2025-01-16 DIAGNOSIS — N92.6 IRREGULAR MENSES: ICD-10-CM

## 2025-01-16 NOTE — PROGRESS NOTES
prevention strategies. Specifically, we discussed:    1) Increased breast cancer surveillance by mammogram and breast MRI as well as the option of prophylactic mastectomy  2) Possible recommendations for prophylactic oophorectomy for results which suggest an increased risk for ovarian cancer  3) Possible recommendations for prophylactic hysterectomy for results which suggest an increased risk for endometrial cancer  4) Increased colon cancer surveillance by colonoscopy screening every 1-2 years beginning by age 20-25y    Lastly, we discussed that the results of Ms. Jaquez's genetic testing may be beneficial in defining her risk for cancer as well as for her family members.     SUMMARY & PLAN  1) Ms. Jaquez meets the NCCN criteria for genetic testing of the BRCA1/2 genes based on having a maternal first degree relative under age 50.     2) Genetic testing via a multi-gene panel was recommended and offered to Ms. Jaquez.     3) Ms. Jaquez elected to proceed with the [facility] Expanded + RNA [employer] Hereditary Cancer Gene Panel.     4) Informed consent was obtained and a blood sample was sent to [facility]. We will call Ms. Jaquez with results as soon as they are available. A follow up appointment may be recommended.  A summary letter with results and final medical management recommendations will be sent once available.      Total time spent was 50 minutes on the date of the service which included preparing to see the patient, face-to-face patient care, completing clinical documentation, and test coordination (when applicable), independently interpreting results (not separately reported), communicating results to the patient/family/caregiver and care coordination (not separately reported).     The [facility] would be glad to offer our assistance should you have any questions or concerns about this information. Please feel free to contact us at [phone].        Wanda LEVY

## 2025-01-20 ENCOUNTER — OFFICE VISIT (OUTPATIENT)
Dept: ONCOLOGY | Age: 25
End: 2025-01-20
Payer: COMMERCIAL

## 2025-01-20 DIAGNOSIS — Z80.3 FAMILY HISTORY OF BREAST CANCER: Primary | ICD-10-CM

## 2025-01-20 PROCEDURE — 96041 GENETIC COUNSELING SVC EA 30: CPT | Performed by: GENETIC COUNSELOR, MS

## 2025-01-21 RX ORDER — VENLAFAXINE HYDROCHLORIDE 37.5 MG/1
CAPSULE, EXTENDED RELEASE ORAL
Qty: 30 CAPSULE | Refills: 0 | OUTPATIENT
Start: 2025-01-21

## 2025-01-22 RX ORDER — VENLAFAXINE HYDROCHLORIDE 37.5 MG/1
CAPSULE, EXTENDED RELEASE ORAL
Qty: 30 CAPSULE | Refills: 0 | Status: SHIPPED | OUTPATIENT
Start: 2025-01-22

## 2025-02-14 ENCOUNTER — TELEPHONE (OUTPATIENT)
Dept: ONCOLOGY | Age: 25
End: 2025-02-14

## 2025-02-14 NOTE — TELEPHONE ENCOUNTER
Attempting to contact patient regarding genetic test results. VM box is full and unable to leave message. Will attempt again.

## 2025-02-16 SDOH — ECONOMIC STABILITY: FOOD INSECURITY: WITHIN THE PAST 12 MONTHS, THE FOOD YOU BOUGHT JUST DIDN'T LAST AND YOU DIDN'T HAVE MONEY TO GET MORE.: NEVER TRUE

## 2025-02-16 SDOH — ECONOMIC STABILITY: FOOD INSECURITY: WITHIN THE PAST 12 MONTHS, YOU WORRIED THAT YOUR FOOD WOULD RUN OUT BEFORE YOU GOT MONEY TO BUY MORE.: NEVER TRUE

## 2025-02-16 SDOH — ECONOMIC STABILITY: TRANSPORTATION INSECURITY
IN THE PAST 12 MONTHS, HAS THE LACK OF TRANSPORTATION KEPT YOU FROM MEDICAL APPOINTMENTS OR FROM GETTING MEDICATIONS?: NO

## 2025-02-16 SDOH — ECONOMIC STABILITY: INCOME INSECURITY: IN THE LAST 12 MONTHS, WAS THERE A TIME WHEN YOU WERE NOT ABLE TO PAY THE MORTGAGE OR RENT ON TIME?: NO

## 2025-02-16 SDOH — ECONOMIC STABILITY: TRANSPORTATION INSECURITY
IN THE PAST 12 MONTHS, HAS LACK OF TRANSPORTATION KEPT YOU FROM MEETINGS, WORK, OR FROM GETTING THINGS NEEDED FOR DAILY LIVING?: NO

## 2025-02-16 ASSESSMENT — PATIENT HEALTH QUESTIONNAIRE - PHQ9
4. FEELING TIRED OR HAVING LITTLE ENERGY: NOT AT ALL
1. LITTLE INTEREST OR PLEASURE IN DOING THINGS: NOT AT ALL
5. POOR APPETITE OR OVEREATING: NOT AT ALL
7. TROUBLE CONCENTRATING ON THINGS, SUCH AS READING THE NEWSPAPER OR WATCHING TELEVISION: NOT AT ALL
3. TROUBLE FALLING OR STAYING ASLEEP: NOT AT ALL
SUM OF ALL RESPONSES TO PHQ QUESTIONS 1-9: 0
SUM OF ALL RESPONSES TO PHQ QUESTIONS 1-9: 0
8. MOVING OR SPEAKING SO SLOWLY THAT OTHER PEOPLE COULD HAVE NOTICED. OR THE OPPOSITE, BEING SO FIGETY OR RESTLESS THAT YOU HAVE BEEN MOVING AROUND A LOT MORE THAN USUAL: NOT AT ALL
6. FEELING BAD ABOUT YOURSELF - OR THAT YOU ARE A FAILURE OR HAVE LET YOURSELF OR YOUR FAMILY DOWN: NOT AT ALL
2. FEELING DOWN, DEPRESSED OR HOPELESS: NOT AT ALL
2. FEELING DOWN, DEPRESSED OR HOPELESS: NOT AT ALL
10. IF YOU CHECKED OFF ANY PROBLEMS, HOW DIFFICULT HAVE THESE PROBLEMS MADE IT FOR YOU TO DO YOUR WORK, TAKE CARE OF THINGS AT HOME, OR GET ALONG WITH OTHER PEOPLE: NOT DIFFICULT AT ALL
SUM OF ALL RESPONSES TO PHQ QUESTIONS 1-9: 0
9. THOUGHTS THAT YOU WOULD BE BETTER OFF DEAD, OR OF HURTING YOURSELF: NOT AT ALL
SUM OF ALL RESPONSES TO PHQ QUESTIONS 1-9: 0
1. LITTLE INTEREST OR PLEASURE IN DOING THINGS: NOT AT ALL
SUM OF ALL RESPONSES TO PHQ QUESTIONS 1-9: 0

## 2025-02-19 ASSESSMENT — PATIENT HEALTH QUESTIONNAIRE - PHQ9
6. FEELING BAD ABOUT YOURSELF - OR THAT YOU ARE A FAILURE OR HAVE LET YOURSELF OR YOUR FAMILY DOWN: NOT AT ALL
SUM OF ALL RESPONSES TO PHQ9 QUESTIONS 1 & 2: 0
3. TROUBLE FALLING OR STAYING ASLEEP: NOT AT ALL
SUM OF ALL RESPONSES TO PHQ QUESTIONS 1-9: 0
8. MOVING OR SPEAKING SO SLOWLY THAT OTHER PEOPLE COULD HAVE NOTICED. OR THE OPPOSITE - BEING SO FIDGETY OR RESTLESS THAT YOU HAVE BEEN MOVING AROUND A LOT MORE THAN USUAL: NOT AT ALL
4. FEELING TIRED OR HAVING LITTLE ENERGY: NOT AT ALL
2. FEELING DOWN, DEPRESSED OR HOPELESS: NOT AT ALL
5. POOR APPETITE OR OVEREATING: NOT AT ALL
7. TROUBLE CONCENTRATING ON THINGS, SUCH AS READING THE NEWSPAPER OR WATCHING TELEVISION: NOT AT ALL
10. IF YOU CHECKED OFF ANY PROBLEMS, HOW DIFFICULT HAVE THESE PROBLEMS MADE IT FOR YOU TO DO YOUR WORK, TAKE CARE OF THINGS AT HOME, OR GET ALONG WITH OTHER PEOPLE: NOT DIFFICULT AT ALL
9. THOUGHTS THAT YOU WOULD BE BETTER OFF DEAD, OR OF HURTING YOURSELF: NOT AT ALL
1. LITTLE INTEREST OR PLEASURE IN DOING THINGS: NOT AT ALL

## 2025-03-06 ENCOUNTER — OFFICE VISIT (OUTPATIENT)
Dept: OBGYN | Age: 25
End: 2025-03-06
Payer: COMMERCIAL

## 2025-03-06 VITALS
SYSTOLIC BLOOD PRESSURE: 114 MMHG | HEIGHT: 61 IN | DIASTOLIC BLOOD PRESSURE: 72 MMHG | BODY MASS INDEX: 33.79 KG/M2 | WEIGHT: 179 LBS

## 2025-03-06 DIAGNOSIS — F41.9 ANXIETY AND DEPRESSION: Primary | ICD-10-CM

## 2025-03-06 DIAGNOSIS — F32.A ANXIETY AND DEPRESSION: Primary | ICD-10-CM

## 2025-03-06 PROCEDURE — 4004F PT TOBACCO SCREEN RCVD TLK: CPT | Performed by: ADVANCED PRACTICE MIDWIFE

## 2025-03-06 PROCEDURE — G8427 DOCREV CUR MEDS BY ELIG CLIN: HCPCS | Performed by: ADVANCED PRACTICE MIDWIFE

## 2025-03-06 PROCEDURE — G8417 CALC BMI ABV UP PARAM F/U: HCPCS | Performed by: ADVANCED PRACTICE MIDWIFE

## 2025-03-06 PROCEDURE — 99213 OFFICE O/P EST LOW 20 MIN: CPT | Performed by: ADVANCED PRACTICE MIDWIFE

## 2025-03-06 SDOH — ECONOMIC STABILITY: FOOD INSECURITY: WITHIN THE PAST 12 MONTHS, YOU WORRIED THAT YOUR FOOD WOULD RUN OUT BEFORE YOU GOT MONEY TO BUY MORE.: NEVER TRUE

## 2025-03-06 SDOH — ECONOMIC STABILITY: FOOD INSECURITY: WITHIN THE PAST 12 MONTHS, THE FOOD YOU BOUGHT JUST DIDN'T LAST AND YOU DIDN'T HAVE MONEY TO GET MORE.: NEVER TRUE

## 2025-03-06 NOTE — PROGRESS NOTES
PROBLEM VISIT     Date of service: 3/6/2025    Shannon Jaquez  Is a 25 y.o. , female    PT's PCP is: Charisma Barkley, APRN - CNP     : 2000                                             Subjective:       Patient's last menstrual period was 2025 (approximate).     OB History    Para Term  AB Living   3 3 3     3   SAB IAB Ectopic Molar Multiple Live Births           0 3      # Outcome Date GA Lbr Dionisio/2nd Weight Sex Type Anes PTL Lv   3 Term 23 39w2d / 00:25 2.739 kg (6 lb 0.6 oz) M Vag-Spont EPI N GRACIELA   2 Term 20 39w0d 03:10 / 00:12 2.438 kg (5 lb 6 oz) F Vag-Spont EPI N GRACIELA   1 Term 19 39w2d 08:06 / 00:24 2.419 kg (5 lb 5.3 oz) F Vag-Spont EPI N GRACIELA        Social History     Tobacco Use   Smoking Status Every Day    Current packs/day: 0.50    Types: Cigarettes   Smokeless Tobacco Never        Social History     Substance and Sexual Activity   Alcohol Use Not Currently       Social History     Substance and Sexual Activity   Sexual Activity Yes    Partners: Male    Birth control/protection: I.U.D.       Allergies: Patient has no known allergies.    Chief Complaint   Patient presents with    Depression     Medication follow up. Patient was previously taking Effexor 37.5 mg. However was having frequent headaches so stopped taking and is doing fine.         Last Yearly date:  2024    Last pap date and results: 2024 negative    Last HPV date and results: never    PE:  Vital Signs  Blood pressure 114/72, height 1.549 m (5' 1\"), weight 81.2 kg (179 lb), last menstrual period 2025, not currently breastfeeding.  Estimated body mass index is 33.82 kg/m² as calculated from the following:    Height as of this encounter: 1.549 m (5' 1\").    Weight as of this encounter: 81.2 kg (179 lb).    No data recorded      NURSE: Latrice PARKER    HPI: here for med check effexor.  patient originally started on effexor due to complaints from erroneous encounter appointment

## 2025-03-17 ENCOUNTER — TRANSCRIBE ORDERS (OUTPATIENT)
Dept: ADMINISTRATIVE | Age: 25
End: 2025-03-17

## 2025-03-17 DIAGNOSIS — R00.0 TACHYCARDIA, UNSPECIFIED: Primary | ICD-10-CM

## 2025-03-31 ENCOUNTER — HOSPITAL ENCOUNTER (OUTPATIENT)
Age: 25
Discharge: HOME | End: 2025-04-02
Payer: COMMERCIAL

## 2025-03-31 DIAGNOSIS — R00.0 TACHYCARDIA, UNSPECIFIED: ICD-10-CM

## 2025-03-31 PROCEDURE — 93247 EXT ECG>7D<15D SCAN A/R: CPT

## 2025-04-03 ENCOUNTER — PROCEDURE VISIT (OUTPATIENT)
Dept: OBGYN | Age: 25
End: 2025-04-03
Payer: COMMERCIAL

## 2025-04-03 VITALS
WEIGHT: 181.4 LBS | BODY MASS INDEX: 34.25 KG/M2 | DIASTOLIC BLOOD PRESSURE: 60 MMHG | SYSTOLIC BLOOD PRESSURE: 104 MMHG | HEIGHT: 61 IN

## 2025-04-03 DIAGNOSIS — Z31.9 PROCREATIVE MANAGEMENT: Primary | ICD-10-CM

## 2025-04-03 PROCEDURE — 58301 REMOVE INTRAUTERINE DEVICE: CPT | Performed by: ADVANCED PRACTICE MIDWIFE

## 2025-04-03 RX ORDER — SODIUM FLUORIDE 5 MG/ML
PASTE, DENTIFRICE DENTAL
COMMUNITY
Start: 2025-02-11 | End: 2025-05-12

## 2025-04-03 NOTE — PROGRESS NOTES
PROBLEM VISIT     Date of service: 4/3/2025    Shannon Jaquez  Is a 25 y.o. , female    PT's PCP is: Charisma Barkley, AHSAN - GI[place]ARLO     : 2000                                             Subjective:       Patient's last menstrual period was 2025 (approximate).   OB History    Para Term  AB Living   3 3 3   3   SAB IAB Ectopic Molar Multiple Live Births       0 3      # Outcome Date GA Lbr Dionisio/2nd Weight Sex Type Anes PTL Lv   3 Term 23 39w2d / 00:25 2.739 kg (6 lb 0.6 oz) M Vag-Spont EPI N GRACIELA   2 Term 20 39w0d 03:10 / 00:12 2.438 kg (5 lb 6 oz) F Vag-Spont EPI N GRACIELA   1 Term 19 39w2d 08:06 / 00:24 2.419 kg (5 lb 5.3 oz) F Vag-Spont EPI N GRACIELA        Social History     Tobacco Use   Smoking Status Every Day    Current packs/day: 0.50    Types: Cigarettes   Smokeless Tobacco Never        Social History     Substance and Sexual Activity   Alcohol Use Not Currently       Allergies: Patient has no known allergies.      Current Outpatient Medications:     SODIUM FLUORIDE, DENTAL GEL, (PREVIDENT 5000 PLUS) 1.1 % CREA, PreviDent 5000 Plus 1.1% Dental Cream 2025 - 2025 Provider: Chrissy Green DDS, Disp: , Rfl:     Levonorgestrel (MIRENA, 52 MG, IU), by IntraUTERine route, Disp: , Rfl:     albuterol sulfate HFA (PROVENTIL;VENTOLIN;PROAIR) 108 (90 Base) MCG/ACT inhaler, , Disp: , Rfl:     SYMBICORT 160-4.5 MCG/ACT AERO, , Disp: , Rfl:     medroxyPROGESTERone (DEPO-PROVERA) 150 MG/ML injection, Inject 1 mL into the muscle every 3 months (Patient not taking: Reported on 4/3/2025), Disp: , Rfl:     Social History     Substance and Sexual Activity   Sexual Activity Yes    Partners: Male    Birth control/protection: I.U.D.       Last Yearly date:  2024    Last pap date and results: 2024 negative    Last HPV date and results: 2023 negative    Pt requests chaperone: No    Chief Complaint   Patient presents with    Menstrual Problem     Patient

## 2025-08-21 ENCOUNTER — HOSPITAL ENCOUNTER (EMERGENCY)
Age: 25
Discharge: HOME | End: 2025-08-21
Payer: COMMERCIAL

## 2025-08-21 ENCOUNTER — APPOINTMENT (OUTPATIENT)
Dept: CT IMAGING | Age: 25
End: 2025-08-21
Payer: COMMERCIAL

## 2025-08-21 VITALS
RESPIRATION RATE: 16 BRPM | BODY MASS INDEX: 32.1 KG/M2 | OXYGEN SATURATION: 100 % | HEART RATE: 84 BPM | HEIGHT: 61 IN | TEMPERATURE: 97.4 F | WEIGHT: 170 LBS | DIASTOLIC BLOOD PRESSURE: 91 MMHG | SYSTOLIC BLOOD PRESSURE: 118 MMHG

## 2025-08-21 DIAGNOSIS — R59.1 LYMPHADENOPATHY: ICD-10-CM

## 2025-08-21 DIAGNOSIS — J01.00 ACUTE MAXILLARY SINUSITIS, RECURRENCE NOT SPECIFIED: Primary | ICD-10-CM

## 2025-08-21 LAB
ALBUMIN SERPL-MCNC: 4.2 G/DL (ref 3.5–5.2)
ALBUMIN/GLOB SERPL: 1.6 {RATIO} (ref 1–2.5)
ALP SERPL-CCNC: 117 U/L (ref 35–104)
ALT SERPL-CCNC: 16 U/L (ref 10–35)
ANION GAP SERPL CALCULATED.3IONS-SCNC: 11 MMOL/L (ref 9–16)
AST SERPL-CCNC: 14 U/L (ref 10–35)
BASOPHILS # BLD: 0.05 K/UL (ref 0–0.2)
BASOPHILS NFR BLD: 1 % (ref 0–2)
BILIRUB SERPL-MCNC: <0.2 MG/DL (ref 0–1.2)
BILIRUB UR QL STRIP: NEGATIVE
BUN SERPL-MCNC: 10 MG/DL (ref 6–20)
BUN/CREAT SERPL: 14 (ref 9–20)
CALCIUM SERPL-MCNC: 9.5 MG/DL (ref 8.6–10.4)
CHLORIDE SERPL-SCNC: 104 MMOL/L (ref 98–107)
CLARITY UR: CLEAR
CO2 SERPL-SCNC: 23 MMOL/L (ref 20–31)
COLOR UR: YELLOW
CREAT SERPL-MCNC: 0.7 MG/DL (ref 0.5–0.9)
EOSINOPHIL # BLD: 0.23 K/UL (ref 0–0.44)
EOSINOPHILS RELATIVE PERCENT: 3 % (ref 1–4)
ERYTHROCYTE [DISTWIDTH] IN BLOOD BY AUTOMATED COUNT: 13.2 % (ref 11.8–14.4)
GFR, ESTIMATED: >90 ML/MIN/1.73M2
GLUCOSE SERPL-MCNC: 84 MG/DL (ref 74–99)
GLUCOSE UR STRIP-MCNC: NEGATIVE MG/DL
HCG UR QL: NEGATIVE
HCT VFR BLD AUTO: 41 % (ref 36.3–47.1)
HETEROPH AB BLD QL IA: NEGATIVE
HGB BLD-MCNC: 13.9 G/DL (ref 11.9–15.1)
HGB UR QL STRIP.AUTO: NEGATIVE
IMM GRANULOCYTES # BLD AUTO: <0.03 K/UL (ref 0–0.3)
IMM GRANULOCYTES NFR BLD: 0 %
KETONES UR STRIP-MCNC: NEGATIVE MG/DL
LEUKOCYTE ESTERASE UR QL STRIP: NEGATIVE
LYMPHOCYTES NFR BLD: 3.39 K/UL (ref 1.1–3.7)
LYMPHOCYTES RELATIVE PERCENT: 47 % (ref 24–43)
MCH RBC QN AUTO: 28.8 PG (ref 25.2–33.5)
MCHC RBC AUTO-ENTMCNC: 33.9 G/DL (ref 28.4–34.8)
MCV RBC AUTO: 85.1 FL (ref 82.6–102.9)
MONOCYTES NFR BLD: 0.51 K/UL (ref 0.1–1.2)
MONOCYTES NFR BLD: 7 % (ref 3–12)
NEUTROPHILS NFR BLD: 42 % (ref 36–65)
NEUTS SEG NFR BLD: 3.02 K/UL (ref 1.5–8.1)
NITRITE UR QL STRIP: NEGATIVE
NRBC BLD-RTO: 0 PER 100 WBC
PH UR STRIP: 7 [PH] (ref 5–9)
PLATELET # BLD AUTO: 368 K/UL (ref 138–453)
PMV BLD AUTO: 10 FL (ref 8.1–13.5)
POTASSIUM SERPL-SCNC: 3.7 MMOL/L (ref 3.7–5.3)
PROT SERPL-MCNC: 6.8 G/DL (ref 6.6–8.7)
PROT UR STRIP-MCNC: NEGATIVE MG/DL
RBC # BLD AUTO: 4.82 M/UL (ref 3.95–5.11)
SODIUM SERPL-SCNC: 138 MMOL/L (ref 136–145)
SP GR UR STRIP: 1.01 (ref 1.01–1.02)
UROBILINOGEN UR STRIP-ACNC: NORMAL EU/DL (ref 0–1)
WBC OTHER # BLD: 7.2 K/UL (ref 3.5–11.3)

## 2025-08-21 PROCEDURE — 81003 URINALYSIS AUTO W/O SCOPE: CPT

## 2025-08-21 PROCEDURE — 99285 EMERGENCY DEPT VISIT HI MDM: CPT

## 2025-08-21 PROCEDURE — 81025 URINE PREGNANCY TEST: CPT

## 2025-08-21 PROCEDURE — 85025 COMPLETE CBC W/AUTO DIFF WBC: CPT

## 2025-08-21 PROCEDURE — 70491 CT SOFT TISSUE NECK W/DYE: CPT

## 2025-08-21 PROCEDURE — 6360000004 HC RX CONTRAST MEDICATION: Performed by: NURSE PRACTITIONER

## 2025-08-21 PROCEDURE — 86308 HETEROPHILE ANTIBODY SCREEN: CPT

## 2025-08-21 PROCEDURE — 70450 CT HEAD/BRAIN W/O DYE: CPT

## 2025-08-21 PROCEDURE — 2580000003 HC RX 258: Performed by: NURSE PRACTITIONER

## 2025-08-21 PROCEDURE — 6370000000 HC RX 637 (ALT 250 FOR IP): Performed by: NURSE PRACTITIONER

## 2025-08-21 PROCEDURE — 80053 COMPREHEN METABOLIC PANEL: CPT

## 2025-08-21 RX ORDER — 0.9 % SODIUM CHLORIDE 0.9 %
1000 INTRAVENOUS SOLUTION INTRAVENOUS ONCE
Status: COMPLETED | OUTPATIENT
Start: 2025-08-21 | End: 2025-08-21

## 2025-08-21 RX ORDER — IOPAMIDOL 755 MG/ML
75 INJECTION, SOLUTION INTRAVASCULAR
Status: COMPLETED | OUTPATIENT
Start: 2025-08-21 | End: 2025-08-21

## 2025-08-21 RX ORDER — PREDNISONE 20 MG/1
TABLET ORAL
Qty: 18 TABLET | Refills: 0 | Status: SHIPPED | OUTPATIENT
Start: 2025-08-21 | End: 2025-08-30

## 2025-08-21 RX ORDER — IBUPROFEN 800 MG/1
800 TABLET, FILM COATED ORAL EVERY 8 HOURS PRN
Qty: 60 TABLET | Refills: 0 | Status: SHIPPED | OUTPATIENT
Start: 2025-08-21

## 2025-08-21 RX ADMIN — SODIUM CHLORIDE 1000 ML: 0.9 INJECTION, SOLUTION INTRAVENOUS at 20:24

## 2025-08-21 RX ADMIN — AMOXICILLIN AND CLAVULANATE POTASSIUM 1 TABLET: 875; 125 TABLET, FILM COATED ORAL at 21:54

## 2025-08-21 RX ADMIN — IOPAMIDOL 75 ML: 755 INJECTION, SOLUTION INTRAVENOUS at 21:09

## 2025-08-21 ASSESSMENT — PAIN DESCRIPTION - PAIN TYPE: TYPE: ACUTE PAIN

## 2025-08-21 ASSESSMENT — PAIN - FUNCTIONAL ASSESSMENT
PAIN_FUNCTIONAL_ASSESSMENT: ACTIVITIES ARE NOT PREVENTED
PAIN_FUNCTIONAL_ASSESSMENT: 0-10
PAIN_FUNCTIONAL_ASSESSMENT: 0-10

## 2025-08-21 ASSESSMENT — PAIN SCALES - GENERAL
PAINLEVEL_OUTOF10: 2
PAINLEVEL_OUTOF10: 8

## 2025-08-21 ASSESSMENT — PAIN DESCRIPTION - ORIENTATION: ORIENTATION: LEFT

## 2025-08-21 ASSESSMENT — PAIN DESCRIPTION - FREQUENCY: FREQUENCY: INTERMITTENT

## 2025-08-21 ASSESSMENT — PAIN DESCRIPTION - LOCATION
LOCATION: NECK
LOCATION: NECK

## 2025-08-21 ASSESSMENT — PAIN DESCRIPTION - DESCRIPTORS: DESCRIPTORS: SHARP

## 2025-08-21 ASSESSMENT — PAIN DESCRIPTION - ONSET: ONSET: GRADUAL
